# Patient Record
Sex: FEMALE | Race: BLACK OR AFRICAN AMERICAN | NOT HISPANIC OR LATINO | Employment: FULL TIME | URBAN - METROPOLITAN AREA
[De-identification: names, ages, dates, MRNs, and addresses within clinical notes are randomized per-mention and may not be internally consistent; named-entity substitution may affect disease eponyms.]

---

## 2017-01-28 ENCOUNTER — OFFICE VISIT (OUTPATIENT)
Dept: URGENT CARE | Facility: URGENT CARE | Age: 32
End: 2017-01-28
Payer: COMMERCIAL

## 2017-01-28 VITALS
WEIGHT: 293 LBS | OXYGEN SATURATION: 96 % | BODY MASS INDEX: 44.31 KG/M2 | TEMPERATURE: 98.3 F | DIASTOLIC BLOOD PRESSURE: 94 MMHG | SYSTOLIC BLOOD PRESSURE: 154 MMHG | HEART RATE: 98 BPM

## 2017-01-28 DIAGNOSIS — R30.0 DYSURIA: Primary | ICD-10-CM

## 2017-01-28 DIAGNOSIS — M54.6 ACUTE RIGHT-SIDED THORACIC BACK PAIN: ICD-10-CM

## 2017-01-28 DIAGNOSIS — N92.6 MISSED PERIOD: ICD-10-CM

## 2017-01-28 DIAGNOSIS — N89.8 VAGINAL DISCHARGE: ICD-10-CM

## 2017-01-28 DIAGNOSIS — N76.0 BACTERIAL VAGINOSIS: ICD-10-CM

## 2017-01-28 DIAGNOSIS — B96.89 BACTERIAL VAGINOSIS: ICD-10-CM

## 2017-01-28 LAB
ALBUMIN UR-MCNC: NEGATIVE MG/DL
APPEARANCE UR: CLEAR
BETA HCG QUAL IFA URINE: NEGATIVE
BILIRUB UR QL STRIP: NEGATIVE
COLOR UR AUTO: YELLOW
GLUCOSE UR STRIP-MCNC: NEGATIVE MG/DL
HGB UR QL STRIP: NEGATIVE
KETONES UR STRIP-MCNC: NEGATIVE MG/DL
LEUKOCYTE ESTERASE UR QL STRIP: NEGATIVE
MICRO REPORT STATUS: ABNORMAL
NITRATE UR QL: NEGATIVE
PH UR STRIP: 5.5 PH (ref 5–7)
SP GR UR STRIP: 1.02 (ref 1–1.03)
SPECIMEN SOURCE: ABNORMAL
URN SPEC COLLECT METH UR: NORMAL
UROBILINOGEN UR STRIP-ACNC: 0.2 EU/DL (ref 0.2–1)
WET PREP SPEC: ABNORMAL

## 2017-01-28 PROCEDURE — 81003 URINALYSIS AUTO W/O SCOPE: CPT | Performed by: PHYSICIAN ASSISTANT

## 2017-01-28 PROCEDURE — 87210 SMEAR WET MOUNT SALINE/INK: CPT | Performed by: PHYSICIAN ASSISTANT

## 2017-01-28 PROCEDURE — 99213 OFFICE O/P EST LOW 20 MIN: CPT | Performed by: PHYSICIAN ASSISTANT

## 2017-01-28 PROCEDURE — 84703 CHORIONIC GONADOTROPIN ASSAY: CPT | Performed by: PHYSICIAN ASSISTANT

## 2017-01-28 RX ORDER — NAPROXEN 500 MG/1
500 TABLET ORAL 2 TIMES DAILY PRN
Qty: 30 TABLET | Refills: 1 | Status: SHIPPED | OUTPATIENT
Start: 2017-01-28 | End: 2017-11-30

## 2017-01-28 RX ORDER — CYCLOBENZAPRINE HCL 10 MG
10 TABLET ORAL
Qty: 14 TABLET | Refills: 1 | Status: SHIPPED | OUTPATIENT
Start: 2017-01-28 | End: 2017-11-30

## 2017-01-28 RX ORDER — METRONIDAZOLE 500 MG/1
500 TABLET ORAL 2 TIMES DAILY
Qty: 14 TABLET | Refills: 0 | Status: SHIPPED | OUTPATIENT
Start: 2017-01-28 | End: 2017-04-11

## 2017-01-28 ASSESSMENT — PAIN SCALES - GENERAL: PAINLEVEL: SEVERE PAIN (6)

## 2017-01-28 NOTE — PROGRESS NOTES
SUBJECTIVE:                                                    Mckayla Jeff is a 31 year old female who presents to clinic today for the following health issues:      Back Pain      Duration: 1 week        Specific cause: none    Description:   Location of pain: low back right, middle of back right and upper back right  Character of pain: sharp and gnawing  Pain radiation:none  New numbness or weakness in legs, not attributed to pain:  no     Intensity: Currently 8/10    History:   Pain interferes with job: YES, starting to  History of back problems: no prior back problems  Any previous MRI or X-rays: None  Sees a specialist for back pain:  No  Therapies tried without relief: pain patch and bengay    Alleviating factors:   Improved by: pain patch      Precipitating factors:  Worsened by: Nothing    Functional and Psychosocial Screen ([facility] STar[facility] Back):      Not performed today       LMP November  No Known Allergies    Past Medical History   Diagnosis Date     Hypertension      PCOS (polycystic ovarian syndrome)      Obesity      Contusion of knee 12/9/2014         Current Outpatient Prescriptions on File Prior to Visit:  losartan-hydrochlorothiazide (HYZAAR) 100-25 MG per tablet TAKE ONE TABLET BY MOUTH EVERY DAY   metFORMIN (GLUCOPHAGE) 500 MG tablet Take 1 tablet (500 mg) by mouth 3 times daily (with meals)     No current facility-administered medications on file prior to visit.    Social History   Substance Use Topics     Smoking status: Never Smoker      Smokeless tobacco: Never Used     Alcohol Use: No       ROS:  General: negative for fever  ABD: Denies abd pain  : as above    OBJECTIVE:  /94 mmHg  Pulse 98  Temp(Src) 98.3  F (36.8  C) (Oral)  Wt 313 lb (141.976 kg)  SpO2 96%  LMP 11/20/2016 (Exact Date)  Breastfeeding? No   General:   awake, alert, and cooperative.  NAD.   Head: Normocephalic, atraumatic.  Eyes: Conjunctiva clear, non icteric.   ABD: soft, no tenderness to palpation , no  rigidity, guarding or rebound . No CVAT  Neuro: Alert and oriented - normal speech.   Results for orders placed or performed in visit on 01/28/17   *UA reflex to Microscopic and Culture ([facility] (except [place] and [place])   Result Value Ref Range    Color Urine Yellow     Appearance Urine Clear     Glucose Urine Negative NEG mg/dL    Bilirubin Urine Negative NEG    Ketones Urine Negative NEG mg/dL    Specific Gravity Urine 1.025 1.003 - 1.035    Blood Urine Negative NEG    pH Urine 5.5 5.0 - 7.0 pH    Protein Albumin Urine Negative NEG mg/dL    Urobilinogen Urine 0.2 0.2 - 1.0 EU/dL    Nitrite Urine Negative NEG    Leukocyte Esterase Urine Negative NEG    Source Midstream Urine    Beta HCG qual IFA urine   Result Value Ref Range    Beta HCG Qual IFA Urine Negative NEG   Wet prep   Result Value Ref Range    Specimen Description Vagina     Wet Prep (A)      No Trichomonas seen  Clue cells seen  No yeast seen      Micro Report Status FINAL 01/28/2017        ASSESSMENT:        ICD-10-CM    1. Dysuria R30.0 *UA reflex to Microscopic and Culture ([facility] (except [facility])   2. Missed period N92.6 Beta HCG qual IFA urine   3. Vaginal discharge N89.8 Wet prep   4. Bacterial vaginosis N76.0 metroNIDAZOLE (FLAGYL) 500 MG tablet    B96.89    5. Acute right-sided thoracic back pain M54.6 cyclobenzaprine (FLEXERIL) 10 MG tablet     naproxen (NAPROSYN) 500 MG tablet       PLAN: F/U PCP prn. Thoracic pain musculoskeletal in nature. F/U PCP prn.     Urmila Maynard PA-C

## 2017-01-28 NOTE — MR AVS SNAPSHOT
After Visit Summary   2017    Mckayla Jeff    MRN: [id number]           Patient Information     Date Of Birth          1985        Visit Information        Provider Department      2017 4:05 PM Urmila Maynard PA-C [facility]        Today's Diagnoses     Dysuria    -  1     Missed period         Vaginal discharge         Bacterial vaginosis         Acute right-sided thoracic back pain           Care Instructions      Bacterial Vaginosis    You have a vaginal infection called bacterial vaginosis (BV). Both good and bad bacteria are present in a healthy vagina. BV occurs when these bacteria get out of balance. The number of bad bacteria increase. And the number of good bacteria decrease.  BV may or may not cause symptoms. If symptoms do occur, they can include:    Thin, gray, milky-white, or sometimes green discharge    Unpleasant odor or  fishy  smell    Itching, burning, or pain in or around the vagina  It is not known what causes BV, but certain factors can make the problem more likely. This can include:    Douching    Having sex with a new partner    Having sex with more than one partner  BV will sometimes go away on its own. But treatment is usually recommended. This is because untreated BV can increase the risk of more serious health problems such as:    Pelvic inflammatory disease (PID)     delivery (giving birth to a baby early if you re pregnant)    HIV and certain other sexually transmitted diseases (STDs)    Infection after surgery on the reproductive organs  Home care  General care    BV is most often treated with medicines called antibiotics. These may be given as pills or as a vaginal cream. If antibiotics are prescribed, be sure to use them exactly as directed. Also, be sure to complete all of the medicine, even if your symptoms go away.    Avoid douching or having sex during treatment.    If you have sex with a female partner, ask your  healthcare provider if she should also be treated.  Prevention    Limit or avoid douching.    Avoid having sex. If you do have sex, then take steps to lower your risk:    Use condoms when having sex.    Limit the number of partners you have sex with.  Follow-up care  Follow up with your healthcare provider, or as advised.  When to seek medical advice  Call your healthcare provider right away if:    You have a fever of 100.4 F (38 C) or higher, or as directed by your provider.    Your symptoms worsen, or they don t go away within a few days of starting treatment.    You have new pain in the lower belly or pelvic region.    You have side effects that bother you or a reaction to the pills or cream you re prescribed.    You or any partners you have sex with have new symptoms, such as a rash, joint pain, or sores.    8926-8701 [employer]. [address] [place] [address]. All rights reserved. This information is not intended as a substitute for professional medical care. Always follow your healthcare professional's instructions.              Follow-ups after your visit        Who to contact     If you have questions or need follow up information about today's clinic visit or your schedule please contact [facility] directly at [phone].  Normal or non-critical lab and imaging results will be communicated to you by Mc4hart, letter or phone within 4 business days after the clinic has received the results. If you do not hear from us within 7 days, please contact the clinic through Mc4hart or phone. If you have a critical or abnormal lab result, we will notify you by phone as soon as possible.  Submit refill requests through SoundBetter or call your pharmacy and they will forward the refill request to us. Please allow 3 business days for your refill to be completed.          Additional Information About Your Visit        SoundBetter Information     SoundBetter lets you send messages to your  "doctor, view your test results, renew your prescriptions, schedule appointments and more. To sign up, go to [email or web address] . Click on \"Log in\" on the left side of the screen, which will take you to the Welcome page. Then click on \"Sign up Now\" on the right side of the page.     You will be asked to enter the access code listed below, as well as some personal information. Please follow the directions to create your username and password.     Your access code is: [email or web address]  Expires: 2017  5:33 PM     Your access code will  in 90 days. If you need help or a new code, please call your [facility] clinic or [phone].        Care EveryWhere ID     This is your Care EveryWhere ID. This could be used by other organizations to access your [facility] medical records  [id number]        Your Vitals Were     Pulse Temperature Pulse Oximetry Last Period Breastfeeding?       98 98.3  F (36.8  C) (Oral) 96% 2016 (Exact Date) No        Blood Pressure from Last 3 Encounters:   17 154/94   16 132/84   03/10/16 134/82    Weight from Last 3 Encounters:   17 313 lb (141.976 kg)   16 312 lb (141.522 kg)   03/10/16 312 lb 3.2 oz (141.613 kg)              We Performed the Following     *UA reflex to Microscopic and Culture ([facility] (except [place] and [place])     Beta HCG qual IFA urine     Wet prep          Today's Medication Changes          These changes are accurate as of: 17  5:33 PM.  If you have any questions, ask your nurse or doctor.               Start taking these medicines.        Dose/Directions    cyclobenzaprine 10 MG tablet   Commonly known as:  FLEXERIL   Used for:  Acute right-sided thoracic back pain   Started by:  Urmila Maynard PA-C        Dose:  10 mg   Take 1 tablet (10 mg) by mouth nightly as needed for muscle spasms   Quantity:  14 tablet   Refills:  1       metroNIDAZOLE 500 MG tablet   Commonly known as:  FLAGYL "   Used for:  Bacterial vaginosis   Started by:  Urmila Maynard PA-C        Dose:  500 mg   Take 1 tablet (500 mg) by mouth 2 times daily   Quantity:  14 tablet   Refills:  0       naproxen 500 MG tablet   Commonly known as:  NAPROSYN   Used for:  Acute right-sided thoracic back pain   Started by:  Urmila Maynard PA-C        Dose:  500 mg   Take 1 tablet (500 mg) by mouth 2 times daily as needed for moderate pain   Quantity:  30 tablet   Refills:  1            Where to get your medicines      These medications were sent to [facility] [address] [place] [address] [address] [facility]  [address] [facility] [address]     Phone:  [phone]    - cyclobenzaprine 10 MG tablet  - metroNIDAZOLE 500 MG tablet  - naproxen 500 MG tablet             Primary Care Provider Office Phone # Fax #    MELANIA Reyes [phone]       [facility] [address]  [facility] [address]        Thank you!     Thank you for choosing [facility]  for your care. Our goal is always to provide you with excellent care. Hearing back from our patients is one way we can continue to improve our services. Please take a few minutes to complete the written survey that you may receive in the mail after your visit with us. Thank you!             Your Updated Medication List - Protect others around you: Learn how to safely use, store and throw away your medicines at www.disposemymeds.org.          This list is accurate as of: 1/28/17  5:33 PM.  Always use your most recent med list.                   Brand Name Dispense Instructions for use    cyclobenzaprine 10 MG tablet    FLEXERIL    14 tablet    Take 1 tablet (10 mg) by mouth nightly as needed for muscle spasms       losartan-hydrochlorothiazide 100-25 MG per tablet    HYZAAR    90 tablet    TAKE ONE TABLET BY MOUTH EVERY DAY       metFORMIN 500 MG tablet    GLUCOPHAGE    270 tablet    Take 1 tablet (500 mg) by mouth 3  times daily (with meals)       metroNIDAZOLE 500 MG tablet    FLAGYL    14 tablet    Take 1 tablet (500 mg) by mouth 2 times daily       naproxen 500 MG tablet    NAPROSYN    30 tablet    Take 1 tablet (500 mg) by mouth 2 times daily as needed for moderate pain

## 2017-01-28 NOTE — NURSING NOTE
"Chief Complaint   Patient presents with     Back Pain     1 week now       Initial /94 mmHg  Pulse 98  Temp(Src) 98.3  F (36.8  C) (Oral)  Wt 313 lb (141.976 kg)  SpO2 96%  LMP 11/20/2016 (Exact Date)  Breastfeeding? No Estimated body mass index is 44.31 kg/(m^2) as calculated from the following:    Height as of 3/10/16: 5' 10.47\" (1.79 m).    Weight as of this encounter: 313 lb (141.976 kg).  BP completed using cuff size: regular on forearm  Xiomara Campbell CMA      "

## 2017-01-28 NOTE — PATIENT INSTRUCTIONS
Bacterial Vaginosis    You have a vaginal infection called bacterial vaginosis (BV). Both good and bad bacteria are present in a healthy vagina. BV occurs when these bacteria get out of balance. The number of bad bacteria increase. And the number of good bacteria decrease.  BV may or may not cause symptoms. If symptoms do occur, they can include:    Thin, gray, milky-white, or sometimes green discharge    Unpleasant odor or  fishy  smell    Itching, burning, or pain in or around the vagina  It is not known what causes BV, but certain factors can make the problem more likely. This can include:    Douching    Having sex with a new partner    Having sex with more than one partner  BV will sometimes go away on its own. But treatment is usually recommended. This is because untreated BV can increase the risk of more serious health problems such as:    Pelvic inflammatory disease (PID)     delivery (giving birth to a baby early if you re pregnant)    HIV and certain other sexually transmitted diseases (STDs)    Infection after surgery on the reproductive organs  Home care  General care    BV is most often treated with medicines called antibiotics. These may be given as pills or as a vaginal cream. If antibiotics are prescribed, be sure to use them exactly as directed. Also, be sure to complete all of the medicine, even if your symptoms go away.    Avoid douching or having sex during treatment.    If you have sex with a female partner, ask your healthcare provider if she should also be treated.  Prevention    Limit or avoid douching.    Avoid having sex. If you do have sex, then take steps to lower your risk:    Use condoms when having sex.    Limit the number of partners you have sex with.  Follow-up care  Follow up with your healthcare provider, or as advised.  When to seek medical advice  Call your healthcare provider right away if:    You have a fever of 100.4 F (38 C) or higher, or as directed by your  provider.    Your symptoms worsen, or they don t go away within a few days of starting treatment.    You have new pain in the lower belly or pelvic region.    You have side effects that bother you or a reaction to the pills or cream you re prescribed.    You or any partners you have sex with have new symptoms, such as a rash, joint pain, or sores.    4384-8003 [employer]. [address] [place] [address], [place] [address]. All rights reserved. This information is not intended as a substitute for professional medical care. Always follow your healthcare professional's instructions.

## 2017-04-11 ENCOUNTER — OFFICE VISIT (OUTPATIENT)
Dept: FAMILY MEDICINE | Facility: CLINIC | Age: 32
End: 2017-04-11
Payer: COMMERCIAL

## 2017-04-11 VITALS
BODY MASS INDEX: 41.02 KG/M2 | SYSTOLIC BLOOD PRESSURE: 124 MMHG | OXYGEN SATURATION: 98 % | TEMPERATURE: 98.2 F | DIASTOLIC BLOOD PRESSURE: 86 MMHG | HEART RATE: 98 BPM | HEIGHT: 71 IN | WEIGHT: 293 LBS

## 2017-04-11 DIAGNOSIS — Z11.1 SCREENING EXAMINATION FOR PULMONARY TUBERCULOSIS: ICD-10-CM

## 2017-04-11 DIAGNOSIS — Z23 ENCOUNTER FOR IMMUNIZATION: ICD-10-CM

## 2017-04-11 DIAGNOSIS — Z12.4 SCREENING FOR MALIGNANT NEOPLASM OF CERVIX: ICD-10-CM

## 2017-04-11 DIAGNOSIS — E66.01 OBESITY, CLASS III, BMI 40-49.9 (MORBID OBESITY) (H): ICD-10-CM

## 2017-04-11 DIAGNOSIS — Z11.3 SCREENING EXAMINATION FOR VENEREAL DISEASE: ICD-10-CM

## 2017-04-11 DIAGNOSIS — I10 ESSENTIAL HYPERTENSION WITH GOAL BLOOD PRESSURE LESS THAN 140/90: ICD-10-CM

## 2017-04-11 DIAGNOSIS — E87.6 HYPOPOTASSEMIA: ICD-10-CM

## 2017-04-11 DIAGNOSIS — Z00.00 ROUTINE HISTORY AND PHYSICAL EXAMINATION OF ADULT: Primary | ICD-10-CM

## 2017-04-11 DIAGNOSIS — E28.2 PCOS (POLYCYSTIC OVARIAN SYNDROME): ICD-10-CM

## 2017-04-11 LAB
ANION GAP SERPL CALCULATED.3IONS-SCNC: 6 MMOL/L (ref 3–14)
BUN SERPL-MCNC: 13 MG/DL (ref 7–30)
CALCIUM SERPL-MCNC: 9.1 MG/DL (ref 8.5–10.1)
CHLORIDE SERPL-SCNC: 104 MMOL/L (ref 94–109)
CHOLEST SERPL-MCNC: 159 MG/DL
CO2 SERPL-SCNC: 29 MMOL/L (ref 20–32)
CREAT SERPL-MCNC: 0.86 MG/DL (ref 0.52–1.04)
GFR SERPL CREATININE-BSD FRML MDRD: 77 ML/MIN/1.7M2
GLUCOSE SERPL-MCNC: 88 MG/DL (ref 70–99)
HBA1C MFR BLD: 6 % (ref 4.3–6)
HDLC SERPL-MCNC: 44 MG/DL
LDLC SERPL CALC-MCNC: 77 MG/DL
MICRO REPORT STATUS: NORMAL
NONHDLC SERPL-MCNC: 115 MG/DL
POTASSIUM SERPL-SCNC: 3.3 MMOL/L (ref 3.4–5.3)
SODIUM SERPL-SCNC: 139 MMOL/L (ref 133–144)
SPECIMEN SOURCE: NORMAL
TRIGL SERPL-MCNC: 188 MG/DL
WET PREP SPEC: NORMAL

## 2017-04-11 PROCEDURE — 87210 SMEAR WET MOUNT SALINE/INK: CPT | Performed by: PHYSICIAN ASSISTANT

## 2017-04-11 PROCEDURE — G0145 SCR C/V CYTO,THINLAYER,RESCR: HCPCS | Performed by: PHYSICIAN ASSISTANT

## 2017-04-11 PROCEDURE — 87624 HPV HI-RISK TYP POOLED RSLT: CPT | Performed by: PHYSICIAN ASSISTANT

## 2017-04-11 PROCEDURE — 87389 HIV-1 AG W/HIV-1&-2 AB AG IA: CPT | Performed by: PHYSICIAN ASSISTANT

## 2017-04-11 PROCEDURE — 99395 PREV VISIT EST AGE 18-39: CPT | Performed by: PHYSICIAN ASSISTANT

## 2017-04-11 PROCEDURE — 86787 VARICELLA-ZOSTER ANTIBODY: CPT | Performed by: PHYSICIAN ASSISTANT

## 2017-04-11 PROCEDURE — 86765 RUBEOLA ANTIBODY: CPT | Performed by: PHYSICIAN ASSISTANT

## 2017-04-11 PROCEDURE — 87591 N.GONORRHOEAE DNA AMP PROB: CPT | Performed by: PHYSICIAN ASSISTANT

## 2017-04-11 PROCEDURE — 80048 BASIC METABOLIC PNL TOTAL CA: CPT | Performed by: PHYSICIAN ASSISTANT

## 2017-04-11 PROCEDURE — 86762 RUBELLA ANTIBODY: CPT | Performed by: PHYSICIAN ASSISTANT

## 2017-04-11 PROCEDURE — 80061 LIPID PANEL: CPT | Performed by: PHYSICIAN ASSISTANT

## 2017-04-11 PROCEDURE — 86780 TREPONEMA PALLIDUM: CPT | Performed by: PHYSICIAN ASSISTANT

## 2017-04-11 PROCEDURE — 87491 CHLMYD TRACH DNA AMP PROBE: CPT | Performed by: PHYSICIAN ASSISTANT

## 2017-04-11 PROCEDURE — 36415 COLL VENOUS BLD VENIPUNCTURE: CPT | Performed by: PHYSICIAN ASSISTANT

## 2017-04-11 PROCEDURE — 83036 HEMOGLOBIN GLYCOSYLATED A1C: CPT | Performed by: PHYSICIAN ASSISTANT

## 2017-04-11 PROCEDURE — 86480 TB TEST CELL IMMUN MEASURE: CPT | Performed by: PHYSICIAN ASSISTANT

## 2017-04-11 RX ORDER — LOSARTAN POTASSIUM AND HYDROCHLOROTHIAZIDE 25; 100 MG/1; MG/1
1 TABLET ORAL DAILY
Qty: 90 TABLET | Refills: 3 | Status: SHIPPED | OUTPATIENT
Start: 2017-04-11 | End: 2018-05-23

## 2017-04-11 NOTE — MR AVS SNAPSHOT
After Visit Summary   4/11/2017    Mckayla Jeff    MRN: [id number]           Patient Information     Date Of Birth          1985        Visit Information        Provider Department      4/11/2017 3:40 PM Renetta Umana PA-C [facility]        Today's Diagnoses     Routine history and physical examination of adult    -  1    Screening for malignant neoplasm of cervix        Essential hypertension with goal blood pressure less than 140/90        PCOS (polycystic ovarian syndrome)        Encounter for immunization        Screening examination for pulmonary tuberculosis        Screening examination for venereal disease        Obesity, Class III, BMI 40-49.9 (morbid obesity) (H)          Care Instructions      Preventive Health Recommendations  Female Ages 26 - 39  Yearly exam:   See your health care provider every year in order to    Review health changes.     Discuss preventive care.      Review your medicines if you your doctor has prescribed any.    Until age 30: Get a Pap test every three years (more often if you have had an abnormal result).    After age 30: Talk to your doctor about whether you should have a Pap test every 3 years or have a Pap test with HPV screening every 5 years.   You do not need a Pap test if your uterus was removed (hysterectomy) and you have not had cancer.  You should be tested each year for STDs (sexually transmitted diseases), if you're at risk.   Talk to your provider about how often to have your cholesterol checked.  If you are at risk for diabetes, you should have a diabetes test (fasting glucose).  Shots: Get a flu shot each year. Get a tetanus shot every 10 years.   Nutrition:     Eat at least 5 servings of fruits and vegetables each day.    Eat whole-grain bread, whole-wheat pasta and brown rice instead of white grains and rice.    Talk to your provider about Calcium and Vitamin D.     Lifestyle    Exercise at least 150  minutes a week (30 minutes a day, 5 days of the week). This will help you control your weight and prevent disease.    Limit alcohol to one drink per day.    No smoking.     Wear sunscreen to prevent skin cancer.    See your dentist every six months for an exam and cleaning.          Follow-ups after your visit        Additional Services     ENDOCRINOLOGY ADULT REFERRAL       Your provider has referred you to: [facility]: [facility] [phone]   [email or web address]      Please be aware that coverage of these services is subject to the terms and limitations of your health insurance plan.  Call member services at your health plan with any benefit or coverage questions.      Please bring the following to your appointment:    >>   Any x-rays, CTs or MRIs which have been performed.  Contact the facility where they were done to arrange for  prior to your scheduled appointment.    >>   List of current medications   >>   This referral request   >>   Any documents/labs given to you for this referral            NUTRITION REFERRAL       Your provider has referred you to: [facility]: [facility] [phone]   [email or web address]/    Please be aware that coverage of these services is subject to the terms and limitations of your health insurance plan.  Call member services at your health plan with any benefit or coverage questions.      Please bring the following with you to your appointment:    (1) This referral request  (2) Any documents given to you regarding this referral  (3) Any specific questions you have about diet and/or food choices                  Who to contact     If you have questions or need follow up information about today's clinic visit or your schedule please contact [facility] directly at [phone].  Normal or non-critical lab and imaging results  "will be communicated to you by MyChart, letter or phone within 4 business days after the clinic has received the results. If you do not hear from us within 7 days, please contact the clinic through File[employer] Document Management software or phone. If you have a critical or abnormal lab result, we will notify you by phone as soon as possible.  Submit refill requests through [employer] Management software or call your pharmacy and they will forward the refill request to us. Please allow 3 business days for your refill to be completed.          Additional Information About Your Visit        FileHold Document Management software Information     File[employer] Document Management software gives you secure access to your electronic health record. If you see a primary care provider, you can also send messages to your care team and make appointments. If you have questions, please call your primary care clinic.  If you do not have a primary care provider, please call [phone] and they will assist you.        Care EveryWhere ID     This is your Care EveryWhere ID. This could be used by other organizations to access your [facility] medical records  [id number]        Your Vitals Were     Pulse Temperature Height Last Period Pulse Oximetry Breastfeeding?    98 98.2  F (36.8  C) (Oral) 5' 11\" (1.803 m) 11/22/2016 98% No    BMI (Body Mass Index)                   42.96 kg/m2            Blood Pressure from Last 3 Encounters:   04/11/17 124/86   01/28/17 (!) 154/94   04/19/16 132/84    Weight from Last 3 Encounters:   04/11/17 (!) 308 lb (139.7 kg)   01/28/17 (!) 313 lb (142 kg)   04/19/16 (!) 312 lb (141.5 kg)              We Performed the Following     Anti Treponema     Basic metabolic panel  (Ca, Cl, CO2, Creat, Gluc, K, Na, BUN)     CHLAMYDIA TRACHOMATIS PCR     ENDOCRINOLOGY ADULT REFERRAL     Hemoglobin A1c     HIV Antigen Antibody Combo     M Tuberculosis by Quantiferon     Mumps Antibodies  IgG ([facility])     NEISSERIA GONORRHOEA PCR     NUTRITION REFERRAL     Pap imaged thin layer screen with HPV - recommended age 30 - 65 years (select " HPV order below)     Rubella Antibody IgG Quantitative     Rubeola Antibody IgG     Varicella Zoster Virus Antibody IgG     Wet prep          Today's Medication Changes          These changes are accurate as of: 4/11/17  4:20 PM.  If you have any questions, ask your nurse or doctor.               These medicines have changed or have updated prescriptions.        Dose/Directions    losartan-hydrochlorothiazide 100-25 MG per tablet   Commonly known as:  HYZAAR   This may have changed:  See the new instructions.   Used for:  Essential hypertension with goal blood pressure less than 140/90   Changed by:  Renetta Umana PA-C        Dose:  1 tablet   Take 1 tablet by mouth daily   Quantity:  90 tablet   Refills:  3            Where to get your medicines      These medications were sent to [facility] [address] [place] [address]     Phone:  [phone]     losartan-hydrochlorothiazide 100-25 MG per tablet    metFORMIN 500 MG tablet                Primary Care Provider Office Phone # Fax #    MELANIA Reyes [phone]       [facility] [address]  [facility] [address]        Thank you!     Thank you for choosing [facility]  for your care. Our goal is always to provide you with excellent care. Hearing back from our patients is one way we can continue to improve our services. Please take a few minutes to complete the written survey that you may receive in the mail after your visit with us. Thank you!             Your Updated Medication List - Protect others around you: Learn how to safely use, store and throw away your medicines at www.disposemymeds.org.          This list is accurate as of: 4/11/17  4:20 PM.  Always use your most recent med list.                   Brand Name Dispense Instructions for use    cyclobenzaprine 10 MG tablet    FLEXERIL    14 tablet    Take 1 tablet  (10 mg) by mouth nightly as needed for muscle spasms       losartan-hydrochlorothiazide 100-25 MG per tablet    HYZAAR    90 tablet    Take 1 tablet by mouth daily       metFORMIN 500 MG tablet    GLUCOPHAGE    270 tablet    Take 1 tablet (500 mg) by mouth 3 times daily (with meals)       naproxen 500 MG tablet    NAPROSYN    30 tablet    Take 1 tablet (500 mg) by mouth 2 times daily as needed for moderate pain

## 2017-04-11 NOTE — PROGRESS NOTES
SUBJECTIVE:     CC: Mckayla Jeff is an 31 year old woman who presents for preventive health visit.     Healthy Habits:    Do you get at least three servings of calcium containing foods daily (dairy, green leafy vegetables, etc.)? no    Amount of exercise or daily activities, outside of work: 4 day(s) per week    Problems taking medications regularly No    Medication side effects: Yes metformin seems to have patient lose appetite     Have you had an eye exam in the past two years? no    Do you see a dentist twice per year? yes    Do you have sleep apnea, excessive snoring or daytime drowsiness?no    Medication  Refill    Taking medication as prescribed: yes   Side effects: loss of appetite   Helping symptoms : yes        Today's PHQ-2 Score:   PHQ-2 ( 1999 Pf[employer]) 6/11/2015 4/18/2014   Q1: Little interest or pleasure in doing things 0 0   Q2: Feeling down, depressed or hopeless 0 1   PHQ-2 Score 0 1       Abuse: Current or Past(Physical, Sexual or Emotional)- No  Do you feel safe in your environment - Yes    Social History   Substance Use Topics     Smoking status: Never Smoker     Smokeless tobacco: Never Used     Alcohol use No     The patient does not drink >3 drinks per day nor >7 drinks per week.    Recent Labs   Lab Test  06/11/15   0927  04/19/14   1018   CHOL  170  161   HDL  35*  32*   LDL  96  100   TRIG  195*  143   CHOLHDLRATIO  4.9  5.0       Reviewed orders with patient.  Reviewed health maintenance and updated orders accordingly - Yes    Mammo Decision Support:  Mammogram not appropriate for this patient based on age.    Pertinent mammograms are reviewed under the imaging tab.  History of abnormal Pap smear: NO - age 30-65 PAP every 5 years with negative HPV co-testing recommended    Reviewed and updated as needed this visit by clinical staff  Tobacco  Allergies  Meds  Med Hx  Surg Hx  Fam Hx  Soc Hx        Reviewed and updated as needed this visit by Provider        Past Medical  "History:   Diagnosis Date     Contusion of knee 12/9/2014     Hypertension      Obesity      PCOS (polycystic ovarian syndrome)       Past Surgical History:   Procedure Laterality Date     NO HISTORY OF SURGERY         ROS:  C: NEGATIVE for fever, chills, change in weight  I: NEGATIVE for worrisome rashes, moles or lesions  E: NEGATIVE for vision changes or irritation  ENT: NEGATIVE for ear, mouth and throat problems  R: NEGATIVE for significant cough or SOB  B: NEGATIVE for masses, tenderness or discharge  CV: NEGATIVE for chest pain, palpitations or peripheral edema  GI: NEGATIVE for nausea, abdominal pain, heartburn, or change in bowel habits  : NEGATIVE for unusual urinary or vaginal symptoms. Periods are regular.  M: NEGATIVE for significant arthralgias or myalgia  N: NEGATIVE for weakness, dizziness or paresthesias  P: NEGATIVE for changes in mood or affect    Problem list, Medication list, Allergies, and Medical/Social/Surgical histories reviewed in [facility] and updated as appropriate.  Current Outpatient Prescriptions   Medication Sig Dispense Refill     losartan-hydrochlorothiazide (HYZAAR) 100-25 MG per tablet Take 1 tablet by mouth daily 90 tablet 3     metFORMIN (GLUCOPHAGE) 500 MG tablet Take 1 tablet (500 mg) by mouth 3 times daily (with meals) 270 tablet 3     cyclobenzaprine (FLEXERIL) 10 MG tablet Take 1 tablet (10 mg) by mouth nightly as needed for muscle spasms 14 tablet 1     naproxen (NAPROSYN) 500 MG tablet Take 1 tablet (500 mg) by mouth 2 times daily as needed for moderate pain 30 tablet 1     [DISCONTINUED] losartan-hydrochlorothiazide (HYZAAR) 100-25 MG per tablet TAKE ONE TABLET BY MOUTH EVERY DAY 90 tablet 1     [DISCONTINUED] metFORMIN (GLUCOPHAGE) 500 MG tablet Take 1 tablet (500 mg) by mouth 3 times daily (with meals) 270 tablet 1     No Known Allergies  OBJECTIVE:     /86  Pulse 98  Temp 98.2  F (36.8  C) (Oral)  Ht 5' 11\" (1.803 m)  Wt (!) 308 lb (139.7 kg)  LMP 11/22/2016  " SpO2 98%  Breastfeeding? No  BMI 42.96 kg/m2  EXAM:  GENERAL: healthy, alert and no distress  EYES: Eyes grossly normal to inspection, PERRL and conjunctivae and sclerae normal  HENT: ear canals and TM's normal, nose and mouth without ulcers or lesions  NECK: no adenopathy, no asymmetry, masses, or scars and thyroid normal to palpation  RESP: lungs clear to auscultation - no rales, rhonchi or wheezes  BREAST: normal without masses, tenderness or nipple discharge and no palpable axillary masses or adenopathy  CV: regular rate and rhythm, normal S1 S2, no S3 or S4, no murmur, click or rub, no peripheral edema and peripheral pulses strong  ABDOMEN: soft, nontender, no hepatosplenomegaly, no masses and bowel sounds normal   (female): normal female external genitalia, normal urethral meatus, vaginal mucosa pink, moist, well rugated, and normal cervix/adnexa/uterus without masses or discharge  MS: no gross musculoskeletal defects noted, no edema  SKIN: no suspicious lesions or rashes  NEURO: Normal strength and tone, mentation intact and speech normal  PSYCH: mentation appears normal, affect normal/bright    ASSESSMENT/PLAN:         ICD-10-CM    1. Routine history and physical examination of adult Z00.00    2. Screening for malignant neoplasm of cervix Z12.4 Pap imaged thin layer screen with HPV - recommended age 30 - 65 years (select HPV order below)   3. Essential hypertension with goal blood pressure less than 140/90 I10 losartan-hydrochlorothiazide (HYZAAR) 100-25 MG per tablet     Basic metabolic panel  (Ca, Cl, CO2, Creat, Gluc, K, Na, BUN)   4. PCOS (polycystic ovarian syndrome) E28.2 metFORMIN (GLUCOPHAGE) 500 MG tablet     Hemoglobin A1c     ENDOCRINOLOGY ADULT REFERRAL     NUTRITION REFERRAL     Lipid Profile (Chol, Trig, HDL, LDL calc)   5. Encounter for immunization Z23 Mumps Antibodies  IgG ([facility])     Rubeola Antibody IgG     Rubella Antibody IgG Quantitative     Varicella Zoster Virus Antibody IgG  "  6. Screening examination for pulmonary tuberculosis Z11.1 M Tuberculosis by [facility]   7. Screening examination for venereal disease Z11.3 NEISSERIA GONORRHOEA PCR     CHLAMYDIA TRACHOMATIS PCR     Wet prep     HIV Antigen Antibody Combo     Anti Treponema   8. Obesity, Class III, BMI 40-49.9 (morbid obesity) (H) E66.01 NUTRITION REFERRAL     Lipid Profile (Chol, Trig, HDL, LDL calc)       COUNSELING:   Reviewed preventive health counseling, as reflected in patient instructions       Regular exercise       Healthy diet/nutrition       Contraception       Safe sex practices/STD prevention         reports that she has never smoked. She has never used smokeless tobacco.    Estimated body mass index is 42.96 kg/(m^2) as calculated from the following:    Height as of this encounter: 5' 11\" (1.803 m).    Weight as of this encounter: 308 lb (139.7 kg).   Weight management plan: Patient referred to endocrine and/or weight management specialty    Counseling Resources:  ATP IV Guidelines  Pooled Cohorts Equation Calculator  Breast Cancer Risk Calculator  FRAX Risk Assessment  ICSI Preventive Guidelines  Dietary Guidelines for Americans, 2010  USDA's MyPlate  ASA Prophylaxis  Lung CA Screening    Renetta Umana PA-C  [facility]  "

## 2017-04-11 NOTE — NURSING NOTE
"Chief Complaint   Patient presents with     Physical     Medication Request       Initial /86  Pulse 98  Temp 98.2  F (36.8  C) (Oral)  Ht 5' 11\" (1.803 m)  Wt (!) 308 lb (139.7 kg)  LMP 11/22/2016  SpO2 98%  Breastfeeding? No  BMI 42.96 kg/m2 Estimated body mass index is 42.96 kg/(m^2) as calculated from the following:    Height as of this encounter: 5' 11\" (1.803 m).    Weight as of this encounter: 308 lb (139.7 kg).  Medication Reconciliation: complete       Aylin Paulino CMA      "

## 2017-04-11 NOTE — LETTER
April 18, 2017    Mckayla Jeff  [address] [place] [address]  [facility] [address]    Dear Mckayla,  We are happy to inform you that your PAP smear result from 4/11/17 is normal.  We are now able to do a follow up test on PAP smears. The DNA test is for HPV (Human Papilloma Virus). Cervical cancer is closely linked with certain types of HPV. Your result showed no evidence of high risk HPV.  Therefore we recommend you return in 5 years for your next pap smear and HPV test.  You will still need to return to the clinic every year for an annual exam and other preventive tests.  Please contact the clinic at [phone] with any questions.  Sincerely,    Renetta Umana PA-C/bertin

## 2017-04-12 LAB
C TRACH DNA SPEC QL NAA+PROBE: NORMAL
HIV 1+2 AB+HIV1 P24 AG SERPL QL IA: NORMAL
N GONORRHOEA DNA SPEC QL NAA+PROBE: NORMAL
SPECIMEN SOURCE: NORMAL
SPECIMEN SOURCE: NORMAL
T PALLIDUM IGG+IGM SER QL: NEGATIVE

## 2017-04-13 LAB
COPATH REPORT: NORMAL
M TB TUBERC IFN-G BLD QL: ABNORMAL
M TB TUBERC IFN-G/MITOGEN IGNF BLD: 0 IU/ML
MEV IGG SER QL IA: 3.1 AI (ref 0–0.8)
PAP: NORMAL
RUBV IGG SERPL IA-ACNC: 38 IU/ML
VZV IGG SER QL IA: 3.1 AI (ref 0–0.8)

## 2017-04-14 RX ORDER — POTASSIUM CHLORIDE 750 MG/1
10 TABLET, EXTENDED RELEASE ORAL DAILY
Qty: 90 TABLET | Refills: 1 | Status: SHIPPED | OUTPATIENT
Start: 2017-04-14 | End: 2018-05-23

## 2017-04-17 LAB
FINAL DIAGNOSIS: NORMAL
HPV HR 12 DNA CVX QL NAA+PROBE: NEGATIVE
HPV16 DNA SPEC QL NAA+PROBE: NEGATIVE
HPV18 DNA SPEC QL NAA+PROBE: NEGATIVE
SPECIMEN DESCRIPTION: NORMAL

## 2017-06-21 ENCOUNTER — OFFICE VISIT (OUTPATIENT)
Dept: FAMILY MEDICINE | Facility: CLINIC | Age: 32
End: 2017-06-21
Payer: COMMERCIAL

## 2017-06-21 ENCOUNTER — TELEPHONE (OUTPATIENT)
Dept: FAMILY MEDICINE | Facility: CLINIC | Age: 32
End: 2017-06-21

## 2017-06-21 VITALS
TEMPERATURE: 97.4 F | DIASTOLIC BLOOD PRESSURE: 86 MMHG | OXYGEN SATURATION: 98 % | WEIGHT: 293 LBS | HEIGHT: 71 IN | HEART RATE: 101 BPM | BODY MASS INDEX: 41.02 KG/M2 | SYSTOLIC BLOOD PRESSURE: 139 MMHG

## 2017-06-21 DIAGNOSIS — Z23 ENCOUNTER FOR IMMUNIZATION: ICD-10-CM

## 2017-06-21 DIAGNOSIS — I10 HYPERTENSION GOAL BP (BLOOD PRESSURE) < 140/90: ICD-10-CM

## 2017-06-21 DIAGNOSIS — Z11.3 SCREEN FOR STD (SEXUALLY TRANSMITTED DISEASE): Primary | ICD-10-CM

## 2017-06-21 DIAGNOSIS — E66.01 OBESITY, CLASS III, BMI 40-49.9 (MORBID OBESITY) (H): ICD-10-CM

## 2017-06-21 DIAGNOSIS — N76.0 BACTERIAL VAGINOSIS: Primary | ICD-10-CM

## 2017-06-21 DIAGNOSIS — B96.89 BACTERIAL VAGINOSIS: Primary | ICD-10-CM

## 2017-06-21 LAB
MICRO REPORT STATUS: ABNORMAL
SPECIMEN SOURCE: ABNORMAL
WET PREP SPEC: ABNORMAL

## 2017-06-21 PROCEDURE — 36415 COLL VENOUS BLD VENIPUNCTURE: CPT | Performed by: PREVENTIVE MEDICINE

## 2017-06-21 PROCEDURE — 87389 HIV-1 AG W/HIV-1&-2 AB AG IA: CPT | Performed by: PREVENTIVE MEDICINE

## 2017-06-21 PROCEDURE — 86706 HEP B SURFACE ANTIBODY: CPT | Performed by: PREVENTIVE MEDICINE

## 2017-06-21 PROCEDURE — 86803 HEPATITIS C AB TEST: CPT | Performed by: PREVENTIVE MEDICINE

## 2017-06-21 PROCEDURE — 87210 SMEAR WET MOUNT SALINE/INK: CPT | Performed by: PREVENTIVE MEDICINE

## 2017-06-21 PROCEDURE — 86735 MUMPS ANTIBODY: CPT | Performed by: PREVENTIVE MEDICINE

## 2017-06-21 PROCEDURE — 99214 OFFICE O/P EST MOD 30 MIN: CPT | Performed by: PREVENTIVE MEDICINE

## 2017-06-21 PROCEDURE — 86780 TREPONEMA PALLIDUM: CPT | Performed by: PREVENTIVE MEDICINE

## 2017-06-21 PROCEDURE — 87340 HEPATITIS B SURFACE AG IA: CPT | Performed by: PREVENTIVE MEDICINE

## 2017-06-21 RX ORDER — METRONIDAZOLE 500 MG/1
500 TABLET ORAL 2 TIMES DAILY
Qty: 14 TABLET | Refills: 0 | Status: SHIPPED | OUTPATIENT
Start: 2017-06-21 | End: 2017-09-19

## 2017-06-21 ASSESSMENT — PAIN SCALES - GENERAL: PAINLEVEL: NO PAIN (0)

## 2017-06-21 NOTE — PROGRESS NOTES
"Mckayla,     The lab test shows evidence of bacterial vaginosis(BV).It is one of the most common cause of vaginitis.It represents a complex change in the vaginal sharath.Approximately 50 to 75 percent of women with BV are asymptomatic. Those with symptoms present with an unpleasant, \"fishy smelling\" discharge that is more noticeable after coitus.I do recommend treatment with metronidazole orally twice a day for a week, that's been sent to the pharmacy for you to .The medication  may give metallic taste in mouth, but over all well tolerated by most. Avoid alcohol while using this medication.  Please call if any questions or concerns.     Please do not hesitate to call us at [phone] if you have any questions or concerns.    Thank you,    Shiloh Ochoa MD MPH"

## 2017-06-21 NOTE — NURSING NOTE
"Chief Complaint   Patient presents with     STD     check       Initial /86  Pulse 101  Temp 97.4  F (36.3  C) (Oral)  Ht 5' 11\" (1.803 m)  Wt (!) 306 lb (138.8 kg)  LMP  (LMP Unknown)  SpO2 98%  Breastfeeding? No  BMI 42.68 kg/m2 Estimated body mass index is 42.68 kg/(m^2) as calculated from the following:    Height as of this encounter: 5' 11\" (1.803 m).    Weight as of this encounter: 306 lb (138.8 kg).  Medication Reconciliation: complete   Bala YOUNG        "

## 2017-06-21 NOTE — TELEPHONE ENCOUNTER
Called patient to let her know she needs to come and redo urine as was not collected correctly but mailbox full so will try later.  Bala YOUNG

## 2017-06-21 NOTE — MR AVS SNAPSHOT
After Visit Summary   6/21/2017    Mckayla Jeff    MRN: [id number]           Patient Information     Date Of Birth          1985        Visit Information        Provider Department      6/21/2017 4:20 PM Shiloh Ochoa MD [facility]        Today's Diagnoses     Screen for STD (sexually transmitted disease)    -  1    Obesity, Class III, BMI 40-49.9 (morbid obesity) (H)        Hypertension goal BP (blood pressure) < 140/90        Encounter for immunization          Care Instructions    At [facility], we strive to deliver an exceptional experience to you, every time we see you.    If you receive a survey in the mail, please send us back your thoughts. We really do value your feedback.    Thank you for visiting [facility]    Normal or non-critical lab and imaging results will be communicated to you by MyChart, letter or phone within 7 days.  If you do not hear from us within 10 days, please call the clinic. If you have a critical or abnormal lab result, we will notify you by phone as soon as possible.     If you have any questions regarding your visit please contact:     Team Luz/Spirit  Clinic Hours Telephone Number   Dr. Murtaza Maciel   7am-7pm  Monday through Thursday  7am-5pm Friday [phone]  Dona CARRILLO RN   Pharmacy 8:30am-9pm Monday-Friday    9am-5pm Saturday-Sunday [phone]   Urgent Care 11am-9pm Monday-Friday        9am-5pm Saturday-Sunday [phone]     After hours, weekend or if you need to make an appointment with your primary provider please call [phone].   After Hours nurse advise: call [facility]: [phone]    Use ShareDeskhart (secure email communication and access to your chart) to send your primary care provider a message or make an appointment. Ask someone on your Team how to sign  up for [email or web address]. To log on to KnowRe or for more information in [facility] please visit the website at [email or web address].   As of October 8, 2013, all password changes, disabled accounts, or ID changes in [email or web address]/[email or web address] will be done by our Access Services Department.   If you need help with your account or password, call: [phone]. Clinic staff no longer has the ability to change passwords.             Follow-ups after your visit        Follow-up notes from your care team     Return in about 6 months (around 12/21/2017) for BP Recheck.      Who to contact     If you have questions or need follow up information about today's clinic visit or your schedule please contact [facility] directly at [phone].  Normal or non-critical lab and imaging results will be communicated to you by DriverSidehart, letter or phone within 4 business days after the clinic has received the results. If you do not hear from us within 7 days, please contact the clinic through Vidientt or phone. If you have a critical or abnormal lab result, we will notify you by phone as soon as possible.  Submit refill requests through BeHome247 or call your pharmacy and they will forward the refill request to us. Please allow 3 business days for your refill to be completed.          Additional Information About Your Visit        [email or web address] Information     BeHome247 gives you secure access to your electronic health record. If you see a primary care provider, you can also send messages to your care team and make appointments. If you have questions, please call your primary care clinic.  If you do not have a primary care provider, please call [phone] and they will assist you.        Care EveryWhere ID     This is your Care EveryWhere ID. This could be used by other organizations to access your [facility] medical records  [id number]        Your Vitals Were     Pulse Temperature Height Last Period Pulse Oximetry Breastfeeding?    101  "97.4  F (36.3  C) (Oral) 5' 11\" (1.803 m) (LMP Unknown) 98% No    BMI (Body Mass Index)                   42.68 kg/m2            Blood Pressure from Last 3 Encounters:   06/21/17 139/86   04/11/17 124/86   01/28/17 (!) 154/94    Weight from Last 3 Encounters:   06/21/17 (!) 306 lb (138.8 kg)   04/11/17 (!) 308 lb (139.7 kg)   01/28/17 (!) 313 lb (142 kg)              We Performed the Following     Anti Treponema     Chlamydia trachomatis PCR     Hepatitis B Surface Antibody     Hepatitis B surface antigen     Hepatitis C antibody     HIV Antigen Antibody Combo     Mumps Antibody IgG     Neisseria gonorrhoeae PCR     Wet prep        Primary Care Provider Office Phone # Fax #    Urmila MELANIA Norton [phone]       [facility] [address]  [facility] [address]        Equal Access to Services     NOEMI BACON : Hadii aad ku hadasho Soomaali, waaxda luqadaha, qaybta kaalmada adeegyada, waxay idiin hayaan adeeg kharash la'nyasia . So [facility] [phone].    ATENCIÓN: Si anivalla español, tiene a ramos disposición servicios gratuitos de asistencia lingüística. Llame al [phone].    We comply with applicable federal civil rights laws and [place] laws. We do not discriminate on the basis of race, color, national origin, age, disability sex, sexual orientation or gender identity.            Thank you!     Thank you for choosing [facility]  for your care. Our goal is always to provide you with excellent care. Hearing back from our patients is one way we can continue to improve our services. Please take a few minutes to complete the written survey that you may receive in the mail after your visit with us. Thank you!             Your Updated Medication List - Protect others around you: Learn how to safely use, store and throw away your medicines at www.disposemymeds.org.          This list is accurate as of: 6/21/17  4:45 PM.  Always use your most recent med list.                "    Brand Name Dispense Instructions for use Diagnosis    cyclobenzaprine 10 MG tablet    FLEXERIL    14 tablet    Take 1 tablet (10 mg) by mouth nightly as needed for muscle spasms    Acute right-sided thoracic back pain       losartan-hydrochlorothiazide 100-25 MG per tablet    HYZAAR    90 tablet    Take 1 tablet by mouth daily    Essential hypertension with goal blood pressure less than 140/90       metFORMIN 500 MG tablet    GLUCOPHAGE    270 tablet    Take 1 tablet (500 mg) by mouth 3 times daily (with meals)    PCOS (polycystic ovarian syndrome)       naproxen 500 MG tablet    NAPROSYN    30 tablet    Take 1 tablet (500 mg) by mouth 2 times daily as needed for moderate pain    Acute right-sided thoracic back pain       potassium chloride SA 10 MEQ CR tablet    K-DUR/KLOR-CON M    90 tablet    Take 1 tablet (10 mEq) by mouth daily    Hypopotassemia

## 2017-06-22 LAB
MUV IGG SER QL IA: 0.8 AI (ref 0–0.8)
T PALLIDUM IGG+IGM SER QL: NEGATIVE

## 2017-06-22 NOTE — PROGRESS NOTES
Mckayla,     Test for Syphilis is negative. Other labs are pending.     Please do not hesitate to call us at [phone] if you have any questions or concerns.    Thank you,    Shiloh Ochoa MD MPH

## 2017-06-23 LAB
HBV SURFACE AB SERPL IA-ACNC: 67.62 M[IU]/ML
HBV SURFACE AG SERPL QL IA: NONREACTIVE
HCV AB SERPL QL IA: NORMAL
HIV 1+2 AB+HIV1 P24 AG SERPL QL IA: NORMAL

## 2017-06-26 NOTE — PROGRESS NOTES
Mckayla,     Tests for HIV, Hepatitis B and C are negative.     Please do not hesitate to call us at [phone] if you have any questions or concerns.    Thank you,    Shiloh Ochoa MD MPH

## 2017-06-28 DIAGNOSIS — Z11.3 SCREEN FOR STD (SEXUALLY TRANSMITTED DISEASE): ICD-10-CM

## 2017-06-28 PROCEDURE — 87591 N.GONORRHOEAE DNA AMP PROB: CPT | Performed by: PREVENTIVE MEDICINE

## 2017-06-28 PROCEDURE — 87491 CHLMYD TRACH DNA AMP PROBE: CPT | Performed by: PREVENTIVE MEDICINE

## 2017-06-30 LAB
C TRACH DNA SPEC QL NAA+PROBE: NORMAL
N GONORRHOEA DNA SPEC QL NAA+PROBE: NORMAL
SPECIMEN SOURCE: NORMAL
SPECIMEN SOURCE: NORMAL

## 2017-07-03 NOTE — PROGRESS NOTES
Mckayla,     Tests for Gonorrhea and Chlamydia are negative.    Please do not hesitate to call us at [phone] if you have any questions or concerns.    Thank you,    Shiloh Ochoa MD MPH

## 2017-09-19 ENCOUNTER — OFFICE VISIT (OUTPATIENT)
Dept: URGENT CARE | Facility: URGENT CARE | Age: 32
End: 2017-09-19
Payer: COMMERCIAL

## 2017-09-19 VITALS
HEART RATE: 95 BPM | TEMPERATURE: 98.7 F | SYSTOLIC BLOOD PRESSURE: 139 MMHG | DIASTOLIC BLOOD PRESSURE: 86 MMHG | OXYGEN SATURATION: 96 % | WEIGHT: 293 LBS | BODY MASS INDEX: 43.71 KG/M2

## 2017-09-19 DIAGNOSIS — N89.8 VAGINAL DISCHARGE: ICD-10-CM

## 2017-09-19 DIAGNOSIS — B96.89 BACTERIAL VAGINOSIS: ICD-10-CM

## 2017-09-19 DIAGNOSIS — N89.8 VAGINAL ODOR: Primary | ICD-10-CM

## 2017-09-19 DIAGNOSIS — N76.0 BACTERIAL VAGINOSIS: ICD-10-CM

## 2017-09-19 LAB
ALBUMIN UR-MCNC: NEGATIVE MG/DL
APPEARANCE UR: CLEAR
BILIRUB UR QL STRIP: NEGATIVE
COLOR UR AUTO: YELLOW
GLUCOSE UR STRIP-MCNC: NEGATIVE MG/DL
HGB UR QL STRIP: NEGATIVE
KETONES UR STRIP-MCNC: NEGATIVE MG/DL
LEUKOCYTE ESTERASE UR QL STRIP: NEGATIVE
NITRATE UR QL: NEGATIVE
PH UR STRIP: 6 PH (ref 5–7)
SOURCE: NORMAL
SP GR UR STRIP: 1.02 (ref 1–1.03)
SPECIMEN SOURCE: ABNORMAL
UROBILINOGEN UR STRIP-ACNC: 0.2 EU/DL (ref 0.2–1)
WET PREP SPEC: ABNORMAL

## 2017-09-19 PROCEDURE — 87210 SMEAR WET MOUNT SALINE/INK: CPT | Performed by: FAMILY MEDICINE

## 2017-09-19 PROCEDURE — 81003 URINALYSIS AUTO W/O SCOPE: CPT | Performed by: FAMILY MEDICINE

## 2017-09-19 PROCEDURE — 99213 OFFICE O/P EST LOW 20 MIN: CPT | Performed by: FAMILY MEDICINE

## 2017-09-19 RX ORDER — METRONIDAZOLE 500 MG/1
500 TABLET ORAL 2 TIMES DAILY
Qty: 14 TABLET | Refills: 0 | Status: SHIPPED | OUTPATIENT
Start: 2017-09-19 | End: 2017-09-26

## 2017-09-19 NOTE — MR AVS SNAPSHOT
After Visit Summary   9/19/2017    Mckayla Jeff    MRN: [id number]           Patient Information     Date Of Birth          1985        Visit Information        Provider Department      9/19/2017 7:20 PM Chris Wynn MD [facility]        Today's Diagnoses     Vaginal odor    -  1    Vaginal discharge        Bacterial vaginosis           Follow-ups after your visit        Who to contact     If you have questions or need follow up information about today's clinic visit or your schedule please contact [facility] directly at [phone].  Normal or non-critical lab and imaging results will be communicated to you by AchieveMinthart, letter or phone within 4 business days after the clinic has received the results. If you do not hear from us within 7 days, please contact the clinic through AchieveMinthart or phone. If you have a critical or abnormal lab result, we will notify you by phone as soon as possible.  Submit refill requests through Real Estate Direct or call your pharmacy and they will forward the refill request to us. Please allow 3 business days for your refill to be completed.          Additional Information About Your Visit        MyChart Information     Real Estate Direct gives you secure access to your electronic health record. If you see a primary care provider, you can also send messages to your care team and make appointments. If you have questions, please call your primary care clinic.  If you do not have a primary care provider, please call [phone] and they will assist you.        Care EveryWhere ID     This is your Care EveryWhere ID. This could be used by other organizations to access your [facility] medical records  [id number]        Your Vitals Were     Pulse Temperature Pulse Oximetry Breastfeeding? BMI (Body Mass Index)       95 98.7  F (37.1  C) (Oral) 96% No 43.71 kg/m2        Blood Pressure from Last 3 Encounters:   09/19/17 139/86   06/21/17 139/86    04/11/17 124/86    Weight from Last 3 Encounters:   09/19/17 (!) 313 lb 6 oz (142.1 kg)   06/21/17 (!) 306 lb (138.8 kg)   04/11/17 (!) 308 lb (139.7 kg)              We Performed the Following     *UA reflex to Microscopic and Culture ([place] and [facility] (except [place] and [place])     Wet prep          Today's Medication Changes          These changes are accurate as of: 9/19/17  8:31 PM.  If you have any questions, ask your nurse or doctor.               Start taking these medicines.        Dose/Directions    metroNIDAZOLE 500 MG tablet   Commonly known as:  FLAGYL   Used for:  Bacterial vaginosis   Started by:  Chris Wynn MD        Dose:  500 mg   Take 1 tablet (500 mg) by mouth 2 times daily for 7 days   Quantity:  14 tablet   Refills:  0            Where to get your medicines      These medications were sent to [facility] [address] [place] [address]     Phone:  [phone]     metroNIDAZOLE 500 MG tablet                Primary Care Provider Office Phone # Fax #    Urmila MELANIA Norton [phone]       [facility] [address]  [facility] [address]        Equal Access to Services     NOEMI BACON AH: Hadii adriana ku hadasho Soomaali, waaxda luqadaha, qaybta kaalmada adeegyada, waxay idiin haynyasia singh. So [facility] [phone].    ATENCIÓN: Si habla español, tiene a ramos disposición servicios gratuitos de asistencia lingüística. Llame al [phone].    We comply with applicable federal civil rights laws and [place] laws. We do not discriminate on the basis of race, color, national origin, age, disability sex, sexual orientation or gender identity.            Thank you!     Thank you for choosing [facility]  for your care. Our goal is always to provide you with excellent care. Hearing back from our patients is one way we can continue to improve our  services. Please take a few minutes to complete the written survey that you may receive in the mail after your visit with us. Thank you!             Your Updated Medication List - Protect others around you: Learn how to safely use, store and throw away your medicines at www.disposemymeds.org.          This list is accurate as of: 9/19/17  8:31 PM.  Always use your most recent med list.                   Brand Name Dispense Instructions for use Diagnosis    cyclobenzaprine 10 MG tablet    FLEXERIL    14 tablet    Take 1 tablet (10 mg) by mouth nightly as needed for muscle spasms    Acute right-sided thoracic back pain       losartan-hydrochlorothiazide 100-25 MG per tablet    HYZAAR    90 tablet    Take 1 tablet by mouth daily    Essential hypertension with goal blood pressure less than 140/90       metFORMIN 500 MG tablet    GLUCOPHAGE    270 tablet    Take 1 tablet (500 mg) by mouth 3 times daily (with meals)    PCOS (polycystic ovarian syndrome)       metroNIDAZOLE 500 MG tablet    FLAGYL    14 tablet    Take 1 tablet (500 mg) by mouth 2 times daily for 7 days    Bacterial vaginosis       naproxen 500 MG tablet    NAPROSYN    30 tablet    Take 1 tablet (500 mg) by mouth 2 times daily as needed for moderate pain    Acute right-sided thoracic back pain       potassium chloride SA 10 MEQ CR tablet    K-DUR/KLOR-CON M    90 tablet    Take 1 tablet (10 mEq) by mouth daily    Hypopotassemia

## 2017-09-19 NOTE — LETTER
[facility]  [address]  [facility] [address]  Phone: [phone]    September 19, 2017        Mckayla Jeff  [address] [place] [address]  [facility] [address]          To whom it may concern:    RE: Mckayla Jeff      Patient was seen and treated this evening at our clinic. She may return to work tomorrow without restrictions.       Sincerely,        Chris Wynn MD

## 2017-09-20 NOTE — NURSING NOTE
"Chief Complaint   Patient presents with     Vaginal Problem     \"foul odor\" per patient, shooting pains in back, brownish discharge, no burning on urination, some pelvic pain (pressure), present for a while       Initial /86 (BP Location: Left arm, Patient Position: Chair, Cuff Size: Adult Large)  Pulse 95  Temp 98.7  F (37.1  C) (Oral)  Wt (!) 313 lb 6 oz (142.1 kg)  SpO2 96%  Breastfeeding? No  BMI 43.71 kg/m2 Estimated body mass index is 43.71 kg/(m^2) as calculated from the following:    Height as of 6/21/17: 5' 11\" (1.803 m).    Weight as of this encounter: 313 lb 6 oz (142.1 kg).  Medication Reconciliation: complete   Li Gr MA    "

## 2017-09-20 NOTE — PROGRESS NOTES
Some of this note was populated by a medical assistant.      SUBJECTIVE:   Mckayla Jeff is a 31 year old female who presents to clinic today for the following health issues:      Vaginal Symptoms  Vaginal  Odor still present. Brown vaginal discharge.   Lumbar bilateral pain shooting up her back for a week. 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         Duration: For quite a while, feels her previous infection (bacterial vaginosis) from June and July never cleared up     Description  vaginal discharge - brownish color, itching, odor and pelvic pain    Intensity:  moderate    Accompanying signs and symptoms (fever/dysuria/abdominal or back pain): as indicated above    History  Sexually active: not at present has been at least 2 months.   Possibility of pregnancy: No  Recent antibiotic use: no     Precipitating or alleviating factors: None    Therapies tried and outcome: none   Outcome: n/a      Problem list and histories reviewed & adjusted, as indicated.  Additional history: as documented    Patient Active Problem List   Diagnosis     Hypertension goal BP (blood pressure) < 140/90     CARDIOVASCULAR SCREENING; LDL GOAL LESS THAN 160     PCOS (polycystic ovarian syndrome)     Obesity, Class III, BMI 40-49.9 (morbid obesity) (H)     Synovitis of knee     Internal derangement of knee     Adjustment disorder with mixed anxiety and depressed mood     Health Care Home     Past Surgical History:   Procedure Laterality Date     NO HISTORY OF SURGERY         Social History   Substance Use Topics     Smoking status: Never Smoker     Smokeless tobacco: Never Used     Alcohol use No     Family History   Problem Relation Age of Onset     Hypertension Mother      Obesity Mother      Depression Mother      Cancer - colorectal Father      DIABETES Maternal Aunt      Depression Maternal Aunt      Asthma No family hx of      CANCER No family hx of      Blood Disease No family hx of       Neurologic Disorder No family hx of      Eye Disorder No family hx of      Thyroid Disease No family hx of      HEART DISEASE No family hx of      Lipids No family hx of          Current Outpatient Prescriptions   Medication Sig Dispense Refill     potassium chloride SA (K-DUR/KLOR-CON M) 10 MEQ CR tablet Take 1 tablet (10 mEq) by mouth daily 90 tablet 1     losartan-hydrochlorothiazide (HYZAAR) 100-25 MG per tablet Take 1 tablet by mouth daily 90 tablet 3     metFORMIN (GLUCOPHAGE) 500 MG tablet Take 1 tablet (500 mg) by mouth 3 times daily (with meals) 270 tablet 3     cyclobenzaprine (FLEXERIL) 10 MG tablet Take 1 tablet (10 mg) by mouth nightly as needed for muscle spasms (Patient not taking: Reported on 9/19/2017) 14 tablet 1     naproxen (NAPROSYN) 500 MG tablet Take 1 tablet (500 mg) by mouth 2 times daily as needed for moderate pain (Patient not taking: Reported on 9/19/2017) 30 tablet 1     No Known Allergies      Reviewed and updated as needed this visit by clinical staffTobacco  Allergies  Meds       Reviewed and updated as needed this visit by Provider         ROS:  Constitutional, HEENT, cardiovascular, pulmonary, gi and gu systems are negative, except as otherwise noted.      OBJECTIVE:   /86 (BP Location: Left arm, Patient Position: Chair, Cuff Size: Adult Large)  Pulse 95  Temp 98.7  F (37.1  C) (Oral)  Wt (!) 313 lb 6 oz (142.1 kg)  SpO2 96%  Breastfeeding? No  BMI 43.71 kg/m2  Body mass index is 43.71 kg/(m^2).   GENERAL: healthy, alert and no distress  NECK: no adenopathy, no asymmetry, masses, or scars and thyroid normal to palpation  RESP: lungs clear to auscultation - no rales, rhonchi or wheezes  CV: regular rate and rhythm, normal S1 S2, no S3 or S4, no murmur, click or rub, no peripheral edema and peripheral pulses strong  ABDOMEN: soft, nontender, no hepatosplenomegaly, no masses and bowel sounds normal  MS: no gross musculoskeletal defects noted, no edema    Results for  orders placed or performed in visit on 09/19/17   *UA reflex to Microscopic and Culture ([place] [facility] (except [place] and [place])   Result Value Ref Range    Color Urine Yellow     Appearance Urine Clear     Glucose Urine Negative NEG^Negative mg/dL    Bilirubin Urine Negative NEG^Negative    Ketones Urine Negative NEG^Negative mg/dL    Specific Gravity Urine 1.020 1.003 - 1.035    Blood Urine Negative NEG^Negative    pH Urine 6.0 5.0 - 7.0 pH    Protein Albumin Urine Negative NEG^Negative mg/dL    Urobilinogen Urine 0.2 0.2 - 1.0 EU/dL    Nitrite Urine Negative NEG^Negative    Leukocyte Esterase Urine Negative NEG^Negative    Source Midstream Urine    Wet prep   Result Value Ref Range    Specimen Description Vagina     Wet Prep No Trichomonas seen     Wet Prep Clue cells seen (A)     Wet Prep No yeast seen         ASSESSMENT:       ICD-10-CM    1. Vaginal odor N89.8 *UA reflex to Microscopic and Culture ([place] [facility] (except [place] and [place])     Wet prep   2. Vaginal discharge N89.8 *UA reflex to Microscopic and Culture ([place] [facility] (except [place] and [place])     Wet prep   3. Bacterial vaginosis N76.0 metroNIDAZOLE (FLAGYL) 500 MG tablet    B96.89         PLAN:   Medicine as above.   Patient educational/instructional material provided including reasons for follow-up    The patient indicates understanding of these issues and agrees with the plan.  Chris Wynn MD        [facility]

## 2017-11-02 ENCOUNTER — TELEPHONE (OUTPATIENT)
Dept: FAMILY MEDICINE | Facility: CLINIC | Age: 32
End: 2017-11-02

## 2017-11-02 ENCOUNTER — DOCUMENTATION ONLY (OUTPATIENT)
Dept: FAMILY MEDICINE | Facility: CLINIC | Age: 32
End: 2017-11-02

## 2017-11-02 NOTE — TELEPHONE ENCOUNTER
What type of form? PHYSICAL EXAM FORM   What day did you drop off your forms? Thursday Nov 2nd 2017  Is there a due date? Unknown (7-10 business day to compete forms)   How would you like to receive these forms? Pt will  form   Provide?  Dinah Dillon   Which clinic was the form dropped off at? [place]   What is the best number to contact you? Pt can be reached at [phone]  What time works best to contact you with in 4 hrs?    Is it okay to leave a message? Yes     FORM ON LUIS Euceda, Patient Rep  [facility]

## 2017-11-02 NOTE — TELEPHONE ENCOUNTER
Patient had a physical with Luiza on 4/11/17. Bringing  Form to Luiza's office and routing message to Team   [facility].  Brittany Diallo MA/  For [facility]

## 2017-11-03 NOTE — TELEPHONE ENCOUNTER
This writer attempted to contact Mckayla on 11/03/17      Reason for call pt's form is completed and at the  for pickup and unable to leave message, mail box is full.      If patient calls back:   Relay message above, (read verbatim), document that pt called and close encounter.        Ronald Farley

## 2017-11-03 NOTE — TELEPHONE ENCOUNTER
Pt's form will be deliver to the  this afternoon for pickup after 1pm.  Ronald Farley,  For [facility]    Please call patient to let them know the above info.  And remind the person who is picking up to bring their photo ID.  Ronald Farley,  For [facility]     NOTE: If patient/gaurdian calls the clinic before the care teams gets a chance to call them, please let pt know the above information regarding pickup times, remind them to bring a photo ID and close the encounter.  Ronald Farley,  For [facility]    FYI:  Anything completed after 2:00p will not be delivered until the next business day after 11a.   Ronald Farley,  for Team[facility].

## 2017-11-06 NOTE — TELEPHONE ENCOUNTER
Reason for Call:  Other returning call    Detailed comments: will come in and get here forms    Call taken on 11/6/2017 at 12:50 PM by Madalyn Trotter

## 2017-11-20 ENCOUNTER — OFFICE VISIT (OUTPATIENT)
Dept: URGENT CARE | Facility: URGENT CARE | Age: 32
End: 2017-11-20
Payer: COMMERCIAL

## 2017-11-20 VITALS
OXYGEN SATURATION: 92 % | HEART RATE: 83 BPM | SYSTOLIC BLOOD PRESSURE: 161 MMHG | DIASTOLIC BLOOD PRESSURE: 92 MMHG | TEMPERATURE: 98.6 F | WEIGHT: 293 LBS | BODY MASS INDEX: 43.77 KG/M2

## 2017-11-20 DIAGNOSIS — R07.9 CHEST PAIN, UNSPECIFIED TYPE: Primary | ICD-10-CM

## 2017-11-20 PROCEDURE — 99213 OFFICE O/P EST LOW 20 MIN: CPT | Performed by: PHYSICIAN ASSISTANT

## 2017-11-20 ASSESSMENT — ENCOUNTER SYMPTOMS
HEADACHES: 0
SORE THROAT: 0
DIARRHEA: 0
CHILLS: 0
MYALGIAS: 0
COUGH: 0
VOMITING: 0
SHORTNESS OF BREATH: 0
WHEEZING: 0
SPUTUM PRODUCTION: 0
SINUS PAIN: 0
NAUSEA: 0
DIAPHORESIS: 0
FEVER: 0

## 2017-11-20 NOTE — MR AVS SNAPSHOT
After Visit Summary   11/20/2017    Mckayla Jeff    MRN: [id number]           Patient Information     Date Of Birth          1985        Visit Information        Provider Department      11/20/2017 11:45 AM Urmila Maynard PA-C [facility]        Today's Diagnoses     Chest pain, unspecified type    -  1       Follow-ups after your visit        Who to contact     If you have questions or need follow up information about today's clinic visit or your schedule please contact [facility] directly at [phone].  Normal or non-critical lab and imaging results will be communicated to you by Phantomhart, letter or phone within 4 business days after the clinic has received the results. If you do not hear from us within 7 days, please contact the clinic through Phantomhart or phone. If you have a critical or abnormal lab result, we will notify you by phone as soon as possible.  Submit refill requests through [employer] or call your pharmacy and they will forward the refill request to us. Please allow 3 business days for your refill to be completed.          Additional Information About Your Visit        MyChart Information     [employer] gives you secure access to your electronic health record. If you see a primary care provider, you can also send messages to your care team and make appointments. If you have questions, please call your primary care clinic.  If you do not have a primary care provider, please call [phone] and they will assist you.        Care EveryWhere ID     This is your Care EveryWhere ID. This could be used by other organizations to access your [place] medical records  [id number]        Your Vitals Were     Pulse Temperature Pulse Oximetry BMI (Body Mass Index)          83 98.6  F (37  C) (Oral) 92% 43.77 kg/m2         Blood Pressure from Last 3 Encounters:   11/20/17 (!) 161/92   09/19/17 139/86   06/21/17 139/86    Weight from Last 3  Encounters:   11/20/17 (!) 313 lb 12.8 oz (142.3 kg)   09/19/17 (!) 313 lb 6 oz (142.1 kg)   06/21/17 (!) 306 lb (138.8 kg)              Today, you had the following     No orders found for display       Primary Care Provider Office Phone # Fax #    MELANIA Reyes [phone]       [facility] [address]  [facility] [address]        Equal Access to Services     NOEMI BACON : Hadii aad ku hadasho Soomaali, waaxda luqadaha, qaybta kaalmada adeegyada, waxay idiin hayaan adeeg kharash celio . So [facility] [phone].    ATENCIÓN: Si habla español, tiene a ramos disposición servicios gratuitos de asistencia lingüística. Llame al [phone].    We comply with applicable federal civil rights laws and [place] laws. We do not discriminate on the basis of race, color, national origin, age, disability, sex, sexual orientation, or gender identity.            Thank you!     Thank you for choosing [facility]  for your care. Our goal is always to provide you with excellent care. Hearing back from our patients is one way we can continue to improve our services. Please take a few minutes to complete the written survey that you may receive in the mail after your visit with us. Thank you!             Your Updated Medication List - Protect others around you: Learn how to safely use, store and throw away your medicines at www.disposemymeds.org.          This list is accurate as of: 11/20/17  5:15 PM.  Always use your most recent med list.                   Brand Name Dispense Instructions for use Diagnosis    cyclobenzaprine 10 MG tablet    FLEXERIL    14 tablet    Take 1 tablet (10 mg) by mouth nightly as needed for muscle spasms    Acute right-sided thoracic back pain       losartan-hydrochlorothiazide 100-25 MG per tablet    HYZAAR    90 tablet    Take 1 tablet by mouth daily    Essential hypertension with goal blood pressure less than 140/90       metFORMIN 500 MG tablet     GLUCOPHAGE    270 tablet    Take 1 tablet (500 mg) by mouth 3 times daily (with meals)    PCOS (polycystic ovarian syndrome)       naproxen 500 MG tablet    NAPROSYN    30 tablet    Take 1 tablet (500 mg) by mouth 2 times daily as needed for moderate pain    Acute right-sided thoracic back pain       potassium chloride SA 10 MEQ CR tablet    K-DUR/KLOR-CON M    90 tablet    Take 1 tablet (10 mEq) by mouth daily    Hypopotassemia

## 2017-11-20 NOTE — PROGRESS NOTES
SUBJECTIVE:   Mckayla Jeff is a 32 year old female presenting with a chief complaint of   Chest pain, lefts ided. Pain comes and goes. Chest pain moderate right now.Her dad passed away from MI age 59. She has HTN, on meds    Onset of symptoms was 1 week(s) ago.  Course of illness is worsening.    Severity moderate  Current and Associated symptoms: lump under left breast  Treatment measures tried include None tried.  Predisposing factors include None.      Review of Systems   Constitutional: Negative for chills, diaphoresis, fever and malaise/fatigue.   HENT: Negative for congestion, ear discharge, ear pain, sinus pain and sore throat.    Respiratory: Negative for cough, sputum production, shortness of breath and wheezing.    Gastrointestinal: Negative for diarrhea, nausea and vomiting.   Musculoskeletal: Negative for myalgias.   Skin: Negative for rash.   Neurological: Negative for headaches.         Past Medical History:   Diagnosis Date     Contusion of knee 12/9/2014     Hypertension      Obesity      PCOS (polycystic ovarian syndrome)      Current Outpatient Prescriptions   Medication Sig Dispense Refill     potassium chloride SA (K-DUR/KLOR-CON M) 10 MEQ CR tablet Take 1 tablet (10 mEq) by mouth daily 90 tablet 1     losartan-hydrochlorothiazide (HYZAAR) 100-25 MG per tablet Take 1 tablet by mouth daily 90 tablet 3     metFORMIN (GLUCOPHAGE) 500 MG tablet Take 1 tablet (500 mg) by mouth 3 times daily (with meals) 270 tablet 3     cyclobenzaprine (FLEXERIL) 10 MG tablet Take 1 tablet (10 mg) by mouth nightly as needed for muscle spasms (Patient not taking: Reported on 9/19/2017) 14 tablet 1     naproxen (NAPROSYN) 500 MG tablet Take 1 tablet (500 mg) by mouth 2 times daily as needed for moderate pain (Patient not taking: Reported on 9/19/2017) 30 tablet 1     Social History   Substance Use Topics     Smoking status: Never Smoker     Smokeless tobacco: Never Used     Alcohol use No       OBJECTIVE  BP (!)  161/92 (BP Location: Left arm, Patient Position: Chair, Cuff Size: Adult Large)  Pulse 83  Temp 98.6  F (37  C) (Oral)  Wt (!) 313 lb 12.8 oz (142.3 kg)  SpO2 92%  BMI 43.77 kg/m2      Physical Exam   Constitutional: She is oriented to person, place, and time and well-developed, well-nourished, and in no distress. No distress.   HENT:   Right Ear: Tympanic membrane and ear canal normal.   Left Ear: Tympanic membrane and ear canal normal.   Mouth/Throat: Mucous membranes are not dry. No posterior oropharyngeal erythema or tonsillar abscesses.   Eyes: EOM are normal.   Neck: Neck supple.   Cardiovascular: Normal rate, regular rhythm and normal heart sounds.    Pulmonary/Chest: Effort normal and breath sounds normal. No respiratory distress. She has no wheezes. She has no rales.   Neurological: She is alert and oriented to person, place, and time. Gait normal.   Skin: Skin is warm and dry. She is not diaphoretic.   Psychiatric: Mood, memory, affect and judgment normal.         ASSESSMENT:      ICD-10-CM    1. Chest pain, unspecified type R07.9          PLAN:  With her significant FHX heart disease, her weight, HTN- should go to ER for eval of her chest pain as we cannot do troponin or D dimer. She will decide which ER and have a friend take her right now.    Urmila Maynard PA-C

## 2017-11-20 NOTE — NURSING NOTE
"Chief Complaint   Patient presents with     Shortness of Breath     Patient complains of shortness of breath and lump under left side of breast       Initial BP (!) 161/92 (BP Location: Left arm, Patient Position: Chair, Cuff Size: Adult Large)  Pulse 83  Temp 98.6  F (37  C) (Oral)  Wt (!) 313 lb 12.8 oz (142.3 kg)  SpO2 92%  BMI 43.77 kg/m2 Estimated body mass index is 43.77 kg/(m^2) as calculated from the following:    Height as of 6/21/17: 5' 11\" (1.803 m).    Weight as of this encounter: 313 lb 12.8 oz (142.3 kg).  Medication Reconciliation: complete       Octavia Merrill    "

## 2017-11-30 ENCOUNTER — OFFICE VISIT (OUTPATIENT)
Dept: FAMILY MEDICINE | Facility: CLINIC | Age: 32
End: 2017-11-30
Payer: COMMERCIAL

## 2017-11-30 VITALS — HEIGHT: 71 IN | SYSTOLIC BLOOD PRESSURE: 133 MMHG | DIASTOLIC BLOOD PRESSURE: 90 MMHG | HEART RATE: 93 BPM

## 2017-11-30 DIAGNOSIS — Z71.85 VACCINE COUNSELING: ICD-10-CM

## 2017-11-30 DIAGNOSIS — I10 ESSENTIAL HYPERTENSION WITH GOAL BLOOD PRESSURE LESS THAN 140/90: ICD-10-CM

## 2017-11-30 DIAGNOSIS — N63.0 LUMP OR MASS IN BREAST: ICD-10-CM

## 2017-11-30 DIAGNOSIS — E66.01 OBESITY, CLASS III, BMI 40-49.9 (MORBID OBESITY) (H): ICD-10-CM

## 2017-11-30 DIAGNOSIS — Z11.1 SCREENING EXAMINATION FOR PULMONARY TUBERCULOSIS: Primary | ICD-10-CM

## 2017-11-30 LAB — VZV IGG SER QL IA: 3.2 AI (ref 0–0.8)

## 2017-11-30 PROCEDURE — 86480 TB TEST CELL IMMUN MEASURE: CPT | Performed by: NURSE PRACTITIONER

## 2017-11-30 PROCEDURE — 36415 COLL VENOUS BLD VENIPUNCTURE: CPT | Performed by: NURSE PRACTITIONER

## 2017-11-30 PROCEDURE — 99214 OFFICE O/P EST MOD 30 MIN: CPT | Performed by: NURSE PRACTITIONER

## 2017-11-30 PROCEDURE — 86787 VARICELLA-ZOSTER ANTIBODY: CPT | Performed by: NURSE PRACTITIONER

## 2017-11-30 RX ORDER — AMLODIPINE BESYLATE 2.5 MG/1
2.5 TABLET ORAL DAILY
Qty: 30 TABLET | Refills: 1 | Status: SHIPPED | OUTPATIENT
Start: 2017-11-30 | End: 2018-05-23

## 2017-11-30 NOTE — MR AVS SNAPSHOT
After Visit Summary   11/30/2017    Mckayla Jeff    MRN: [id number]           Patient Information     Date Of Birth          1985        Visit Information        Provider Department      11/30/2017 9:20 AM Urmila Dillon APRN [facility]        Today's Diagnoses     Screening examination for pulmonary tuberculosis    -  1    Vaccine counseling        Lump or mass in breast          Care Instructions      Breast Lump, Uncertain Cause    A lump was found in your breast. Most breast lumps are not cancer. They may be caused by normal changes in the breast tissue due to hormone variations that occur with your menstrual cycle. Some women may form lumps that are painful and tender. Others may form lumps that are painless.  At this time, is not possible to be certain of the cause of your lump without further evaluation. This could include:    Another exam by your healthcare provider or a gynecologist    Imaging tests, such as a mammogram or ultrasound    Biopsy (procedure to remove small tissue samples from the breast lump)  Your healthcare provider will explain any additional testing that is needed. Be sure to get answers to any questions you may have.  Home care  Until a diagnosis is made, you may be advised to do the following:    If you are having breast pain:    Take an over-the-counter pain reliever, if directed to by your provider.    Wear a well-fitted bra or sports bra for extra support. If you have breast pain at night, try wearing the bra during sleep.    Apply a warm compress (towel soaked in warm water) to the breast. You may also use a hot water bottle.    Check your breasts each day. Keep a log of whether the lump seems to be changing in size or tenderness with your period. This can help your healthcare provider make the correct diagnosis.  Follow-up care  Follow up with your healthcare provider, or as directed. Keep all appointments. Also, prepare for any  upcoming tests as directed.  When to seek medical advice  Call your healthcare provider right away if any of these occur:    Fever of 100.4 F (38 C) or higher    Redness or swelling of the breast    Discharge from the nipple    Visible changes in the skin over the nipple or breast    Lump grows larger, feels very hard, or has an irregular shape    New lumps form  Date Last Reviewed: 3/1/2017    9000-4680 [employer]. [address] [place] [address]. All rights reserved. This information is not intended as a substitute for professional medical care. Always follow your healthcare professional's instructions.                Follow-ups after your visit        Future tests that were ordered for you today     Open Future Orders        Priority Expected Expires Ordered    US Breast Left Complete 4 Quadrants Routine  11/30/2018 11/30/2017    *MA Screening Digital Bilateral Routine  11/30/2018 11/30/2017            Who to contact     If you have questions or need follow up information about today's clinic visit or your schedule please contact [facility] directly at [phone].  Normal or non-critical lab and imaging results will be communicated to you by CodersClanhart, letter or phone within 4 business days after the clinic has received the results. If you do not hear from us within 7 days, please contact the clinic through Ciplext or phone. If you have a critical or abnormal lab result, we will notify you by phone as soon as possible.  Submit refill requests through Accept Software or call your pharmacy and they will forward the refill request to us. Please allow 3 business days for your refill to be completed.          Additional Information About Your Visit        CodersClanharPressmart Information     [employer] Software gives you secure access to your electronic health record. If you see a primary care provider, you can also send messages to your care team and make appointments. If you have questions, please call  "your primary care clinic.  If you do not have a primary care provider, please call [phone] and they will assist you.        Care EveryWhere ID     This is your Care EveryWhere ID. This could be used by other organizations to access your [place] medical records  [id number]        Your Vitals Were     Pulse Height                93 5' 11\" (1.803 m)           Blood Pressure from Last 3 Encounters:   11/30/17 133/90   11/20/17 (!) 161/92   09/19/17 139/86    Weight from Last 3 Encounters:   11/20/17 (!) 313 lb 12.8 oz (142.3 kg)   09/19/17 (!) 313 lb 6 oz (142.1 kg)   06/21/17 (!) 306 lb (138.8 kg)              We Performed the Following     M Tuberculosis by Quantiferon     Varicella Zoster Virus Antibody IgG        Primary Care Provider Office Phone # Fax #    Urmila LAKEISHA Dillon, MELANIA [phone]       [facility] [address]  [facility] [address]        Equal Access to Services     [facility]: Hadii aad ku hadasho Soomaali, waaxda luqadaha, qaybta kaalmada adeegyada, waxay bambi pickens . So [facility] [phone].    ATENCIÓN: Si habla español, tiene a ramos disposición servicios gratuitos de asistencia lingüística. Llame al [phone].    We comply with applicable federal civil rights laws and [place] laws. We do not discriminate on the basis of race, color, national origin, age, disability, sex, sexual orientation, or gender identity.            Thank you!     Thank you for choosing [facility]  for your care. Our goal is always to provide you with excellent care. Hearing back from our patients is one way we can continue to improve our services. Please take a few minutes to complete the written survey that you may receive in the mail after your visit with us. Thank you!             Your Updated Medication List - Protect others around you: Learn how to safely use, store and throw away your medicines at www.disposemymeds.org.          This " list is accurate as of: 11/30/17 10:20 AM.  Always use your most recent med list.                   Brand Name Dispense Instructions for use Diagnosis    losartan-hydrochlorothiazide 100-25 MG per tablet    HYZAAR    90 tablet    Take 1 tablet by mouth daily    Essential hypertension with goal blood pressure less than 140/90       metFORMIN 500 MG tablet    GLUCOPHAGE    270 tablet    Take 1 tablet (500 mg) by mouth 3 times daily (with meals)    PCOS (polycystic ovarian syndrome)       potassium chloride SA 10 MEQ CR tablet    K-DUR/KLOR-CON M    90 tablet    Take 1 tablet (10 mEq) by mouth daily    Hypopotassemia

## 2017-11-30 NOTE — PATIENT INSTRUCTIONS
Breast Lump, Uncertain Cause    A lump was found in your breast. Most breast lumps are not cancer. They may be caused by normal changes in the breast tissue due to hormone variations that occur with your menstrual cycle. Some women may form lumps that are painful and tender. Others may form lumps that are painless.  At this time, is not possible to be certain of the cause of your lump without further evaluation. This could include:    Another exam by your healthcare provider or a gynecologist    Imaging tests, such as a mammogram or ultrasound    Biopsy (procedure to remove small tissue samples from the breast lump)  Your healthcare provider will explain any additional testing that is needed. Be sure to get answers to any questions you may have.  Home care  Until a diagnosis is made, you may be advised to do the following:    If you are having breast pain:    Take an over-the-counter pain reliever, if directed to by your provider.    Wear a well-fitted bra or sports bra for extra support. If you have breast pain at night, try wearing the bra during sleep.    Apply a warm compress (towel soaked in warm water) to the breast. You may also use a hot water bottle.    Check your breasts each day. Keep a log of whether the lump seems to be changing in size or tenderness with your period. This can help your healthcare provider make the correct diagnosis.  Follow-up care  Follow up with your healthcare provider, or as directed. Keep all appointments. Also, prepare for any upcoming tests as directed.  When to seek medical advice  Call your healthcare provider right away if any of these occur:    Fever of 100.4 F (38 C) or higher    Redness or swelling of the breast    Discharge from the nipple    Visible changes in the skin over the nipple or breast    Lump grows larger, feels very hard, or has an irregular shape    New lumps form  Date Last Reviewed: 3/1/2017    6588-1088 [employer]. [address] [facility],  [place] [address]. All rights reserved. This information is not intended as a substitute for professional medical care. Always follow your healthcare professional's instructions.

## 2017-11-30 NOTE — PROGRESS NOTES
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)    Intensity: moderate    Progression of Symptoms:      Accompanying Signs & Symptoms:      Previous history of similar problem:       Precipitating factors:   Worsened by:     Alleviating factors:  Improved by:     Therapies Tried and outcome:   Patient due for TB screen-  No unexplained hoarseness  No loss of appetite  No unexplained weight loss  No productive or prolonged cough  No bloody sputum  Np persistent fever over 100 F  No night sweats  No hemoptysis  No shortness of breath  No unexplained fatigue or weakness  No chest pain  No recurrent pneumonia  No unprotected exposure to a known TB patient    Lump under left breast- noted initially in June but it went away.  Lump came back about 10 days ago, started feeling sore about 2 days ago. No abnormal nipple discharge, no erythema, skin changes, dimpling, no FH breast cancers.      Hypertension Follow-up      Outpatient blood pressures are being checked at home.  Results are 166/98.    Low Salt Diet: no added salt          Problem list and histories reviewed & adjusted, as indicated.  Additional history: as documented    Patient Active Problem List   Diagnosis     Essential hypertension with goal blood pressure less than 140/90     CARDIOVASCULAR SCREENING; LDL GOAL LESS THAN 160     PCOS (polycystic ovarian syndrome)     Obesity, Class III, BMI 40-49.9 (morbid obesity) (H)     Synovitis of knee     Internal derangement of knee     Adjustment disorder with mixed anxiety and depressed mood     Health Care Home     Past Surgical History:   Procedure Laterality Date     NO HISTORY OF SURGERY         Social History   Substance Use Topics     Smoking status: Never Smoker     Smokeless tobacco: Never Used     Alcohol use No      "Family History   Problem Relation Age of Onset     Hypertension Mother      Obesity Mother      Depression Mother      Cancer - colorectal Father      DIABETES Maternal Aunt      Depression Maternal Aunt      Asthma No family hx of      CANCER No family hx of      Blood Disease No family hx of      Neurologic Disorder No family hx of      Eye Disorder No family hx of      Thyroid Disease No family hx of      HEART DISEASE No family hx of      Lipids No family hx of          Current Outpatient Prescriptions   Medication Sig Dispense Refill     potassium chloride SA (K-DUR/KLOR-CON M) 10 MEQ CR tablet Take 1 tablet (10 mEq) by mouth daily 90 tablet 1     losartan-hydrochlorothiazide (HYZAAR) 100-25 MG per tablet Take 1 tablet by mouth daily 90 tablet 3     metFORMIN (GLUCOPHAGE) 500 MG tablet Take 1 tablet (500 mg) by mouth 3 times daily (with meals) 270 tablet 3     BP Readings from Last 3 Encounters:   11/30/17 133/90   11/20/17 (!) 161/92   09/19/17 139/86    Wt Readings from Last 3 Encounters:   11/20/17 (!) 313 lb 12.8 oz (142.3 kg)   09/19/17 (!) 313 lb 6 oz (142.1 kg)   06/21/17 (!) 306 lb (138.8 kg)                  Labs reviewed in EPIC        Reviewed and updated as needed this visit by clinical staffTobacco  Allergies  Meds  Med Hx  Surg Hx  Soc Hx      Reviewed and updated as needed this visit by Provider         ROS:  Constitutional, HEENT, cardiovascular, pulmonary, gi and gu systems are negative, except as otherwise noted.      OBJECTIVE:   /90 (BP Location: Right arm, Patient Position: Chair, Cuff Size: Adult Large)  Pulse 93  Ht 5' 11\" (1.803 m)  There is no height or weight on file to calculate BMI.  GENERAL: healthy, alert and no distress  EYES: Eyes grossly normal to inspection, PERRL and conjunctivae and sclerae normal  HENT: ear canals and TM's normal, nose and mouth without ulcers or lesions  NECK: no adenopathy, no asymmetry, masses, or scars and thyroid normal to palpation  RESP: " "lungs clear to auscultation - no rales, rhonchi or wheezes  BREAST: no palpable axillary masses or adenopathy and 2 cm circumscribed, nontender mass left breast 0600 axis, otherwise, no other breast masses, skin changes, nipple discharge.  CV: regular rate and rhythm, normal S1 S2, no S3 or S4, no murmur, click or rub, no peripheral edema and peripheral pulses strong  ABDOMEN: soft, nontender, no hepatosplenomegaly, no masses and bowel sounds normal  MS: no gross musculoskeletal defects noted, no edema    Diagnostic Test Results:  No results found for this or any previous visit (from the past 24 hour(s)).    ASSESSMENT/PLAN:         BMI:   Estimated body mass index is 43.77 kg/(m^2) as calculated from the following:    Height as of 6/21/17: 5' 11\" (1.803 m).    Weight as of 11/20/17: 313 lb 12.8 oz (142.3 kg).   Weight management plan: Discussed healthy diet and exercise guidelines and patient will follow up in 6 months in clinic to re-evaluate.        1. Screening examination for pulmonary tuberculosis    - M Tuberculosis by [employer]ifer[employer]    2. Vaccine counseling  - Varicella Zoster Virus Antibody IgG    3. Lump or mass in breast  Getting mammo, US.  - US Breast Left Complete 4 Quadrants; Future  - *MA Screening Digital Bilateral; Future    4. Obesity, Class III, BMI 40-49.9 (morbid obesity) (H)  Benefits of weight loss reviewed in detail, encouraged her to cut back on the carbohydrates in the diet, consume more fruits and vegetables, drink plenty of water, avoid fruit juices, sodas, get 150 min moderate exercise/week.  Recheck weight in 6 months.  She declines Nutrition referral at this time.      5. Essential hypertension with goal blood pressure less than 140/90  Adding Norvasc to Hyzaar 100-25, recheck BP in 1 month.  Low salt diet advised, encouraged weight loss, regular exercise.  - amLODIPine (NORVASC) 2.5 MG tablet; Take 1 tablet (2.5 mg) by mouth daily  Dispense: 30 tablet; Refill: 1    See Patient " Instructions    MELANIA Domingo CNP  [facility]

## 2017-11-30 NOTE — NURSING NOTE
"Chief Complaint   Patient presents with     Imm/Inj     Patient needs a TB test, and titer for chicken pox for school       Initial /90 (BP Location: Right arm, Patient Position: Chair, Cuff Size: Adult Large)  Pulse 93  Ht 5' 11\" (1.803 m) Estimated body mass index is 43.77 kg/(m^2) as calculated from the following:    Height as of 6/21/17: 5' 11\" (1.803 m).    Weight as of 11/20/17: 313 lb 12.8 oz (142.3 kg).  Medication Reconciliation: complete   Estella Cadena MA      "

## 2017-12-01 ENCOUNTER — TELEPHONE (OUTPATIENT)
Dept: FAMILY MEDICINE | Facility: CLINIC | Age: 32
End: 2017-12-01

## 2017-12-01 LAB
M TB TUBERC IFN-G BLD QL: NEGATIVE
M TB TUBERC IFN-G/MITOGEN IGNF BLD: 0 IU/ML

## 2017-12-01 NOTE — TELEPHONE ENCOUNTER
Re: referral for breast screening    The orders need to be changed from screening to diagnostic    Can you send that to the providers you referred her to    Call if questions / ok to leave voicemail  [phone]    Thank you

## 2017-12-18 ENCOUNTER — RADIANT APPOINTMENT (OUTPATIENT)
Dept: ULTRASOUND IMAGING | Facility: CLINIC | Age: 32
End: 2017-12-18
Attending: NURSE PRACTITIONER
Payer: COMMERCIAL

## 2017-12-18 ENCOUNTER — RADIANT APPOINTMENT (OUTPATIENT)
Dept: MAMMOGRAPHY | Facility: CLINIC | Age: 32
End: 2017-12-18
Attending: NURSE PRACTITIONER
Payer: COMMERCIAL

## 2017-12-18 DIAGNOSIS — N63.0 LUMP OR MASS IN BREAST: ICD-10-CM

## 2017-12-18 DIAGNOSIS — L98.8 SKIN LESION OF BREAST: Primary | ICD-10-CM

## 2017-12-18 PROCEDURE — G0204 DX MAMMO INCL CAD BI: HCPCS | Performed by: RADIOLOGY

## 2017-12-18 PROCEDURE — 76642 ULTRASOUND BREAST LIMITED: CPT | Mod: 50 | Performed by: RADIOLOGY

## 2017-12-23 ENCOUNTER — OFFICE VISIT (OUTPATIENT)
Dept: URGENT CARE | Facility: URGENT CARE | Age: 32
End: 2017-12-23
Payer: COMMERCIAL

## 2017-12-23 VITALS
OXYGEN SATURATION: 97 % | HEART RATE: 82 BPM | DIASTOLIC BLOOD PRESSURE: 92 MMHG | SYSTOLIC BLOOD PRESSURE: 153 MMHG | TEMPERATURE: 98.5 F

## 2017-12-23 DIAGNOSIS — L03.011 PARONYCHIA OF FINGER OF RIGHT HAND: Primary | ICD-10-CM

## 2017-12-23 PROCEDURE — 99213 OFFICE O/P EST LOW 20 MIN: CPT | Performed by: FAMILY MEDICINE

## 2017-12-23 NOTE — MR AVS SNAPSHOT
After Visit Summary   12/23/2017    Mckayla Jeff    MRN: [id number]           Patient Information     Date Of Birth          1985        Visit Information        Provider Department      12/23/2017 3:05 PM Spike Kumar MD [facility]        Today's Diagnoses     Paronychia of finger of right hand    -  1      Care Instructions      Paronychia of the Finger or Toe  Paronychia is an infection near a fingernail or toenail. It usually occurs when an opening in the cuticle or an ingrown toenail lets bacteria under the skin.  The infection will need to be drained if pus is present. If the infection has been caught early, you may need only antibiotic treatment. Healing will take about 1 to 2 weeks.  Home care  Follow these guidelines when caring for yourself at home:    Clean and soak the toe or finger. Do this 2 times a day for the first 3 days. To do so:    Soak your foot or hand in a tub of warm water for 5 minutes. Or hold your toe or finger under a faucet of warm running water for 5 minutes.    Clean any crust away with soap and water using a cotton swab.    Put antibiotic ointment on the infected area.    Change the dressing daily or any time it gets dirty.    If you were given antibiotics, take them as directed until they are all gone.    If your infection is on a toe, wear comfortable shoes with a lot of toe room. You can also wear open-toed sandals while your toe heals.    You may use over-the-counter medicine (acetaminophen or ibuprofen to help with pain, unless another medicine was prescribed. If you have chronic liver or kidney disease, talk with your healthcare provider before using these medicines. Also talk with your provider if you've had a stomach ulcer or GI (gastrointestinal) bleeding.  Prevention  The following can prevent paronychia:    Avoid cutting or playing with your cuticles at home.    Don't bite your nails.    Don't suck on your thumbs or  fingers.  Follow-up care  Follow up with your healthcare provider, or as advised.  When to seek medical advice  Call your healthcare provider right away if any of these occur:    Redness, pain, or swelling of the finger or toe gets worse    Red streaks in the skin leading away from the wound    Pus or fluid draining from the nail area    Fever of 100.4 F (38 C) or higher, or as directed by your provider  Date Last Reviewed: 8/1/2016 2000-2017 [employer]. [address] [place] [address]. All rights reserved. This information is not intended as a substitute for professional medical care. Always follow your healthcare professional's instructions.                Follow-ups after your visit        Who to contact     If you have questions or need follow up information about today's clinic visit or your schedule please contact [facility] directly at [phone].  Normal or non-critical lab and imaging results will be communicated to you by MyChart, letter or phone within 4 business days after the clinic has received the results. If you do not hear from us within 7 days, please contact the clinic through [employer] or phone. If you have a critical or abnormal lab result, we will notify you by phone as soon as possible.  Submit refill requests through [employer] or call your pharmacy and they will forward the refill request to us. Please allow 3 business days for your refill to be completed.          Additional Information About Your Visit        MyChart Information     [employer] gives you secure access to your electronic health record. If you see a primary care provider, you can also send messages to your care team and make appointments. If you have questions, please call your primary care clinic.  If you do not have a primary care provider, please call [phone] and they will assist you.        Care EveryWhere ID     This is your Care EveryWhere ID. This could be used by other  organizations to access your [place] medical records  [id number]        Your Vitals Were     Pulse Temperature Pulse Oximetry             82 98.5  F (36.9  C) (Oral) 97%          Blood Pressure from Last 3 Encounters:   12/23/17 (!) 153/92   11/30/17 133/90   11/20/17 (!) 161/92    Weight from Last 3 Encounters:   11/20/17 (!) 313 lb 12.8 oz (142.3 kg)   09/19/17 (!) 313 lb 6 oz (142.1 kg)   06/21/17 (!) 306 lb (138.8 kg)              Today, you had the following     No orders found for display         Today's Medication Changes          These changes are accurate as of: 12/23/17  3:35 PM.  If you have any questions, ask your nurse or doctor.               Start taking these medicines.        Dose/Directions    amoxicillin-clavulanate 875-125 MG per tablet   Commonly known as:  AUGMENTIN   Used for:  Paronychia of finger of right hand   Started by:  Spike Kumar MD        Dose:  1 tablet   Take 1 tablet by mouth 2 times daily   Quantity:  20 tablet   Refills:  0            Where to get your medicines      These medications were sent to [facility] [address] [place] [address] [address] [address]     Phone:  [phone]     amoxicillin-clavulanate 875-125 MG per tablet                Primary Care Provider Office Phone # Fax #    Urmila MELANIA Norton [phone]       [facility] [address]  [facility] [address]        Equal Access to Services     ADRIANA BACON AH: Hadii adriana ku hadasho Soomaali, waaxda luqadaha, qaybta kaalmada adeegyada, waxay idiin hayaan adeeg kharash la'aan . So [facility] [phone].    ATENCIÓN: Si habla español, tiene a ramos disposición servicios gratuitos de asistencia lingüística. Llame al [phone].    We comply with applicable federal civil rights laws and [place] laws. We do not discriminate on the basis of race, color, national origin, age, disability, sex, sexual orientation, or gender  identity.            Thank you!     Thank you for choosing [facility]  for your care. Our goal is always to provide you with excellent care. Hearing back from our patients is one way we can continue to improve our services. Please take a few minutes to complete the written survey that you may receive in the mail after your visit with us. Thank you!             Your Updated Medication List - Protect others around you: Learn how to safely use, store and throw away your medicines at www.disposemymeds.org.          This list is accurate as of: 12/23/17  3:35 PM.  Always use your most recent med list.                   Brand Name Dispense Instructions for use Diagnosis    amLODIPine 2.5 MG tablet    NORVASC    30 tablet    Take 1 tablet (2.5 mg) by mouth daily    Essential hypertension with goal blood pressure less than 140/90       amoxicillin-clavulanate 875-125 MG per tablet    AUGMENTIN    20 tablet    Take 1 tablet by mouth 2 times daily    Paronychia of finger of right hand       losartan-hydrochlorothiazide 100-25 MG per tablet    HYZAAR    90 tablet    Take 1 tablet by mouth daily    Essential hypertension with goal blood pressure less than 140/90       metFORMIN 500 MG tablet    GLUCOPHAGE    270 tablet    Take 1 tablet (500 mg) by mouth 3 times daily (with meals)    PCOS (polycystic ovarian syndrome)       potassium chloride SA 10 MEQ CR tablet    K-DUR/KLOR-CON M    90 tablet    Take 1 tablet (10 mEq) by mouth daily    Hypopotassemia

## 2017-12-23 NOTE — PROGRESS NOTES
SUBJECTIVE:   Mckayla Jeff is a 32 year old female who presents to clinic today for the following health issues:      Finger Infection      Duration: 2 days    Description (location/character/radiation): right hand middle finger    Intensity:  moderate    Accompanying signs and symptoms: pain, swelling, bruising, shooting pain in hand     History (similar episodes/previous evaluation): None    Precipitating or alleviating factors: None    Therapies tried and outcome: ice, tylenol    Works in hospice care          Problem list and histories reviewed & adjusted, as indicated.  Additional history: as documented    Patient Active Problem List   Diagnosis     Essential hypertension with goal blood pressure less than 140/90     CARDIOVASCULAR SCREENING; LDL GOAL LESS THAN 160     PCOS (polycystic ovarian syndrome)     Obesity, Class III, BMI 40-49.9 (morbid obesity) (H)     Synovitis of knee     Internal derangement of knee     Adjustment disorder with mixed anxiety and depressed mood     Health Care Home     Past Surgical History:   Procedure Laterality Date     NO HISTORY OF SURGERY         Social History   Substance Use Topics     Smoking status: Never Smoker     Smokeless tobacco: Never Used     Alcohol use No     Family History   Problem Relation Age of Onset     Hypertension Mother      Obesity Mother      Depression Mother      Cancer - colorectal Father      DIABETES Maternal Aunt      Depression Maternal Aunt      Asthma No family hx of      CANCER No family hx of      Blood Disease No family hx of      Neurologic Disorder No family hx of      Eye Disorder No family hx of      Thyroid Disease No family hx of      HEART DISEASE No family hx of      Lipids No family hx of          Current Outpatient Prescriptions   Medication Sig Dispense Refill     amLODIPine (NORVASC) 2.5 MG tablet Take 1 tablet (2.5 mg) by mouth daily 30 tablet 1     potassium chloride SA (K-DUR/KLOR-CON M) 10 MEQ CR tablet Take 1 tablet  (10 mEq) by mouth daily 90 tablet 1     losartan-hydrochlorothiazide (HYZAAR) 100-25 MG per tablet Take 1 tablet by mouth daily 90 tablet 3     metFORMIN (GLUCOPHAGE) 500 MG tablet Take 1 tablet (500 mg) by mouth 3 times daily (with meals) 270 tablet 3     No Known Allergies  Recent Labs   Lab Test  04/11/17   1629 07/13/16  11/30/15   1023   06/11/15   0927  04/29/15   1733  04/01/15   1114  06/10/14   1250  04/19/14   1018   A1C  6.0   --    --    --    --    --    --    --   5.5   LDL  77   --    --    --   96   --    --    --   100   HDL  44*   --    --    --   35*   --    --    --   32*   TRIG  188*   --    --    --   195*   --    --    --   143   ALT   --    --    --    --    --   36  34  43  29   CR  0.86  0.94  0.85   < >  0.96   --   1.01  0.79  0.85   GFRESTIMATED  77   --   78   < >  68   --   64  86  79   GFRESTBLACK  >90   GFR Calc     --   >90   GFR Calc     < >  82   --   78  >90  >90   POTASSIUM  3.3*  3.3  3.4   < >  3.5   --   3.4  3.3*  3.7   TSH   --    --    --    --    --    --    --    --   1.79    < > = values in this interval not displayed.      BP Readings from Last 3 Encounters:   12/23/17 (!) 153/92   11/30/17 133/90   11/20/17 (!) 161/92    Wt Readings from Last 3 Encounters:   11/20/17 (!) 313 lb 12.8 oz (142.3 kg)   09/19/17 (!) 313 lb 6 oz (142.1 kg)   06/21/17 (!) 306 lb (138.8 kg)                  Labs reviewed in EPIC          Reviewed and updated as needed this visit by clinical staff     Reviewed and updated as needed this visit by Provider         ROS:  Constitutional, HEENT, cardiovascular, pulmonary, gi and gu systems are negative, except as otherwise noted.      OBJECTIVE:   BP (!) 153/92 (BP Location: Left arm, Patient Position: Chair, Cuff Size: Adult Regular)  Pulse 82  Temp 98.5  F (36.9  C) (Oral)  SpO2 97%  There is no height or weight on file to calculate BMI.  GENERAL: healthy, alert and no distress  NECK: no adenopathy, no  asymmetry, masses, or scars and thyroid normal to palpation  RESP: lungs clear to auscultation - no rales, rhonchi or wheezes  CV: regular rate and rhythm, normal S1 S2, no S3 or S4, no murmur, click or rub, no peripheral edema and peripheral pulses strong  ABDOMEN: soft, nontender, no hepatosplenomegaly, no masses and bowel sounds normal  MS: acute paronychia without abscess involving right 3rd finger as shown below             ASSESSMENT/PLAN:         ICD-10-CM    1. Paronychia of finger of right hand L03.011 amoxicillin-clavulanate (AUGMENTIN) 875-125 MG per tablet    right 3rd       Discussed in detail differentials and further management. Symptoms are likely secondary to acute paronychia without abscess. Suggested to soak finger in warm water, topical bacitracin and will also cover with Augmentin. Instructed to follow if symptoms persist or worsen, would consider I&D if develops abscess. Recommended well hydration and over-the-counter analgesia. Written instructions/information provided. Patient understood and in agreement with the above plan. All questions are answered.         Patient Instructions     Paronychia of the Finger or Toe  Paronychia is an infection near a fingernail or toenail. It usually occurs when an opening in the cuticle or an ingrown toenail lets bacteria under the skin.  The infection will need to be drained if pus is present. If the infection has been caught early, you may need only antibiotic treatment. Healing will take about 1 to 2 weeks.  Home care  Follow these guidelines when caring for yourself at home:    Clean and soak the toe or finger. Do this 2 times a day for the first 3 days. To do so:    Soak your foot or hand in a tub of warm water for 5 minutes. Or hold your toe or finger under a faucet of warm running water for 5 minutes.    Clean any crust away with soap and water using a cotton swab.    Put antibiotic ointment on the infected area.    Change the dressing daily or any  time it gets dirty.    If you were given antibiotics, take them as directed until they are all gone.    If your infection is on a toe, wear comfortable shoes with a lot of toe room. You can also wear open-toed sandals while your toe heals.    You may use over-the-counter medicine (acetaminophen or ibuprofen to help with pain, unless another medicine was prescribed. If you have chronic liver or kidney disease, talk with your healthcare provider before using these medicines. Also talk with your provider if you've had a stomach ulcer or GI (gastrointestinal) bleeding.  Prevention  The following can prevent paronychia:    Avoid cutting or playing with your cuticles at home.    Don't bite your nails.    Don't suck on your thumbs or fingers.  Follow-up care  Follow up with your healthcare provider, or as advised.  When to seek medical advice  Call your healthcare provider right away if any of these occur:    Redness, pain, or swelling of the finger or toe gets worse    Red streaks in the skin leading away from the wound    Pus or fluid draining from the nail area    Fever of 100.4 F (38 C) or higher, or as directed by your provider  Date Last Reviewed: 8/1/2016 2000-2017 [employer]. [address] [place] [address], [place] [address]. All rights reserved. This information is not intended as a substitute for professional medical care. Always follow your healthcare professional's instructions.            Spike Kumar MD  [facility]

## 2017-12-23 NOTE — NURSING NOTE
"Chief Complaint   Patient presents with     Finger     Patient complains of infection in right hand middle finger       Initial BP (!) 153/92 (BP Location: Left arm, Patient Position: Chair, Cuff Size: Adult Regular)  Pulse 82  Temp 98.5  F (36.9  C) (Oral)  SpO2 97% Estimated body mass index is 43.77 kg/(m^2) as calculated from the following:    Height as of 6/21/17: 5' 11\" (1.803 m).    Weight as of 11/20/17: 313 lb 12.8 oz (142.3 kg).  Medication Reconciliation: complete       Octavia Merrill    "

## 2018-01-31 ENCOUNTER — RADIANT APPOINTMENT (OUTPATIENT)
Dept: GENERAL RADIOLOGY | Facility: CLINIC | Age: 33
End: 2018-01-31
Attending: NURSE PRACTITIONER
Payer: COMMERCIAL

## 2018-01-31 ENCOUNTER — OFFICE VISIT (OUTPATIENT)
Dept: URGENT CARE | Facility: URGENT CARE | Age: 33
End: 2018-01-31
Payer: COMMERCIAL

## 2018-01-31 VITALS
HEART RATE: 85 BPM | WEIGHT: 285 LBS | OXYGEN SATURATION: 96 % | DIASTOLIC BLOOD PRESSURE: 77 MMHG | TEMPERATURE: 97.3 F | BODY MASS INDEX: 39.75 KG/M2 | SYSTOLIC BLOOD PRESSURE: 113 MMHG

## 2018-01-31 DIAGNOSIS — S99.911A ANKLE INJURY, RIGHT, INITIAL ENCOUNTER: Primary | ICD-10-CM

## 2018-01-31 PROCEDURE — 99213 OFFICE O/P EST LOW 20 MIN: CPT | Performed by: NURSE PRACTITIONER

## 2018-01-31 PROCEDURE — 73610 X-RAY EXAM OF ANKLE: CPT | Mod: RT

## 2018-01-31 RX ORDER — IBUPROFEN 600 MG/1
600 TABLET, FILM COATED ORAL EVERY 6 HOURS PRN
Qty: 30 TABLET | Refills: 0 | Status: SHIPPED | OUTPATIENT
Start: 2018-01-31 | End: 2018-02-20

## 2018-01-31 ASSESSMENT — PAIN SCALES - GENERAL: PAINLEVEL: MODERATE PAIN (5)

## 2018-01-31 NOTE — LETTER
[facility]  [address]  [facility] [address]  Phone: [phone]    January 31, 2018        Mckayla Jeff  [address] [place] [address] [address]  [facility] [address]          To whom it may concern:    RE: Mckayla Jeff    Patient was seen and treated today at our clinic and missed work.  Patient may return to work on 2/1/2018 with no restrictions.    Please contact me for questions or concerns.      Sincerely,        Vielka Atkinson NP

## 2018-01-31 NOTE — MR AVS SNAPSHOT
After Visit Summary   1/31/2018    Mckayla Jeff    MRN: [id number]           Patient Information     Date Of Birth          1985        Visit Information        Provider Department      1/31/2018 6:00 PM Vielka Atkinson NP [facility]        Today's Diagnoses     Ankle injury, right, initial encounter    -  1      Care Instructions      Muscle Strain in the Extremities  A muscle strain is a stretching and tearing of muscle fibers. This causes pain, especially when you move that muscle. There may also be some swelling and bruising.  Home care    Keep the hurt area raised to reduce pain and swelling. This is especially important during the first 48 hours.    Apply an ice pack over the injured area for 15 to 20 minutes every 3 to 6 hours. You should do this for the first 24 to 48 hours. You can make an ice pack by filling a plastic bag that seals at the top with ice cubes and then wrapping it with a thin towel. Be careful not to injure your skin with the ice treatments. Ice should never be applied directly to skin. Continue the use of ice packs for relief of pain and swelling as needed. After 48 hours, apply heat (warm shower or warm bath) for 15 to 20 minutes several times a day, or alternate ice and heat.    You may use over-the-counter pain medicine to control pain, unless another medicine was prescribed. If you have chronic liver or kidney disease or ever had a stomach ulcer or GI bleeding, talk with your healthcare provider before using these medicines.    For leg strains: If crutches have been recommended, don t put full weight on the hurt leg until you can do so without pain. You can return to sports when you are able to hop and run on the injured leg without pain.  Follow-up care  Follow up with your healthcare provider, or as advised.  When to seek medical advice  Call your healthcare provider right away if any of these occur:    The toes of the injured leg  become swollen, cold, blue, numb, or tingly    Pain or swelling increases  Date Last Reviewed: 11/19/2015 2000-2017 [employer]. [address] [place] [address], [place] [address]. All rights reserved. This information is not intended as a substitute for professional medical care. Always follow your healthcare professional's instructions.                Follow-ups after your visit        Who to contact     If you have questions or need follow up information about today's clinic visit or your schedule please contact [facility] directly at [phone].  Normal or non-critical lab and imaging results will be communicated to you by [employer], letter or phone within 4 business days after the clinic has received the results. If you do not hear from us within 7 days, please contact the clinic through [employer] or phone. If you have a critical or abnormal lab result, we will notify you by phone as soon as possible.  Submit refill requests through [employer] or call your pharmacy and they will forward the refill request to us. Please allow 3 business days for your refill to be completed.          Additional Information About Your Visit        MyChart Information     [employer] gives you secure access to your electronic health record. If you see a primary care provider, you can also send messages to your care team and make appointments. If you have questions, please call your primary care clinic.  If you do not have a primary care provider, please call [phone] and they will assist you.        Care EveryWhere ID     This is your Care EveryWhere ID. This could be used by other organizations to access your [facility] medical records  [id number]        Your Vitals Were     Pulse Temperature Last Period Pulse Oximetry Breastfeeding? BMI (Body Mass Index)    85 97.3  F (36.3  C) (Oral) (Exact Date) 96% No 39.75 kg/m2       Blood Pressure from Last 3 Encounters:   01/31/18 113/77   12/23/17 (!) 153/92    11/30/17 133/90    Weight from Last 3 Encounters:   01/31/18 285 lb (129.3 kg)   11/20/17 (!) 313 lb 12.8 oz (142.3 kg)   09/19/17 (!) 313 lb 6 oz (142.1 kg)              We Performed the Following     XR Ankle Right G/E 3 Views          Today's Medication Changes          These changes are accurate as of 1/31/18  7:10 PM.  If you have any questions, ask your nurse or doctor.               Start taking these medicines.        Dose/Directions    ibuprofen 600 MG tablet   Commonly known as:  ADVIL/MOTRIN   Used for:  Ankle injury, right, initial encounter   Started by:  Vielka Atkinson NP        Dose:  600 mg   Take 1 tablet (600 mg) by mouth every 6 hours as needed for moderate pain   Quantity:  30 tablet   Refills:  0            Where to get your medicines      These medications were sent to [facility] [address] [place] [address] [address] [address]     Phone:  [phone]     ibuprofen 600 MG tablet                Primary Care Provider Office Phone # Fax #    MELANIA Reyes [phone]       [facility] [address]  [facility] [address]        Equal Access to Services     NOEMI BACON AH: Hadii adriana ku hadasho Soomaali, waaxda luqadaha, qaybta kaalmada adeegyada, waxay idiin haynyasia singh. So [facility] [phone].    ATENCIÓN: Si habla español, tiene a ramos disposición servicios gratuitos de asistencia lingüística. Llame al [phone].    We comply with applicable federal civil rights laws and [place] laws. We do not discriminate on the basis of race, color, national origin, age, disability, sex, sexual orientation, or gender identity.            Thank you!     Thank you for choosing [facility]  for your care. Our goal is always to provide you with excellent care. Hearing back from our patients is one way we can continue to improve our services. Please take a few minutes to complete the written  survey that you may receive in the mail after your visit with us. Thank you!             Your Updated Medication List - Protect others around you: Learn how to safely use, store and throw away your medicines at www.disposemymeds.org.          This list is accurate as of 1/31/18  7:10 PM.  Always use your most recent med list.                   Brand Name Dispense Instructions for use Diagnosis    amLODIPine 2.5 MG tablet    NORVASC    30 tablet    Take 1 tablet (2.5 mg) by mouth daily    Essential hypertension with goal blood pressure less than 140/90       ibuprofen 600 MG tablet    ADVIL/MOTRIN    30 tablet    Take 1 tablet (600 mg) by mouth every 6 hours as needed for moderate pain    Ankle injury, right, initial encounter       losartan-hydrochlorothiazide 100-25 MG per tablet    HYZAAR    90 tablet    Take 1 tablet by mouth daily    Essential hypertension with goal blood pressure less than 140/90       metFORMIN 500 MG tablet    GLUCOPHAGE    270 tablet    Take 1 tablet (500 mg) by mouth 3 times daily (with meals)    PCOS (polycystic ovarian syndrome)       potassium chloride SA 10 MEQ CR tablet    K-DUR/KLOR-CON M    90 tablet    Take 1 tablet (10 mEq) by mouth daily    Hypopotassemia

## 2018-02-01 NOTE — NURSING NOTE
"Chief Complaint   Patient presents with     Trauma     Pt c/o right ankle injury from twisting it this morning.        Initial /77 (BP Location: Left arm, Patient Position: Chair, Cuff Size: Adult Large)  Pulse 85  Temp 97.3  F (36.3  C) (Oral)  Wt 285 lb (129.3 kg)  LMP  (Exact Date)  SpO2 96%  Breastfeeding? No  BMI 39.75 kg/m2 Estimated body mass index is 39.75 kg/(m^2) as calculated from the following:    Height as of 11/30/17: 5' 11\" (1.803 m).    Weight as of this encounter: 285 lb (129.3 kg).  Medication Reconciliation: complete     Liliam Payton CMA (AAMA)      "

## 2018-02-01 NOTE — PROGRESS NOTES
SUBJECTIVE:   Mckayla Jeff is a 32 year old female who presents to clinic today for the following health issues:    Musculoskeletal problem/pain      Duration: today    Description  Location: right ankle    Intensity:  9/10-earlier today when happened; currently 5/10    Accompanying signs and symptoms: swelling    History  Previous similar problem: Unknown  Previous evaluation:  x-ray    Precipitating or alleviating factors:  Trauma or overuse: YES- pt was walking when she stepped on some ice and twisted her ankle.  Aggravating factors include: putting pressure on foot, walking    Therapies tried and outcome: elevating, ice, ibuprofen- some relief.       Past Medical History:   Diagnosis Date     Contusion of knee 12/9/2014     Hypertension      Obesity      PCOS (polycystic ovarian syndrome)      History   Smoking Status     Never Smoker   Smokeless Tobacco     Never Used       ROS:  GEN no fevers  SKIN no erythema  Musculoskeletal:  See HPI.      OBJECTIVE:  Blood pressure 113/77, pulse 85, temperature 97.3  F (36.3  C), temperature source Oral, weight 285 lb (129.3 kg), SpO2 96 %, not currently breastfeeding.  Patient is alert and NAD.  EYES: conjunctiva clear  Ankle Exam (right):  Inspection:bruising around the lateral malleolus  Palpation:tender over lateral malleolus  Cap refill intact.    Good doralis pedis.  Neurovascularly Intact Distally.     XR no fx/fb   Results for orders placed or performed in visit on 01/31/18 (from the past 24 hour(s))   XR Ankle Right G/E 3 Views    Narrative    ANKLE THREE VIEWS RIGHT  1/31/2018 6:36 PM     HISTORY: Twisted ankle on ice.     COMPARISON: None.      Impression    IMPRESSION: There is a well-corticated bony fragment along the distal  aspect of the medial malleolus that may represent a chronic fracture  deformity. No acute appearing fracture identified. Ankle mortise joint  appears intact. No significant soft tissue swelling. There is anterior  calcaneal  megan.    TIERRA GREEN MD       ASSESSMENT:    ICD-10-CM    1. Ankle injury, right, initial encounter S99.911A XR Ankle Right G/E 3 Views     ibuprofen (ADVIL/MOTRIN) 600 MG tablet         PLAN:  Ankle splint provided today.  OTC pain control as needed.  Ice, elevate, lowly increase activity level with active range of motion exercises encouraged.    Vielka Atkinson  [facility]  Family Nurse Practitoner

## 2018-02-01 NOTE — PATIENT INSTRUCTIONS
Muscle Strain in the Extremities  A muscle strain is a stretching and tearing of muscle fibers. This causes pain, especially when you move that muscle. There may also be some swelling and bruising.  Home care    Keep the hurt area raised to reduce pain and swelling. This is especially important during the first 48 hours.    Apply an ice pack over the injured area for 15 to 20 minutes every 3 to 6 hours. You should do this for the first 24 to 48 hours. You can make an ice pack by filling a plastic bag that seals at the top with ice cubes and then wrapping it with a thin towel. Be careful not to injure your skin with the ice treatments. Ice should never be applied directly to skin. Continue the use of ice packs for relief of pain and swelling as needed. After 48 hours, apply heat (warm shower or warm bath) for 15 to 20 minutes several times a day, or alternate ice and heat.    You may use over-the-counter pain medicine to control pain, unless another medicine was prescribed. If you have chronic liver or kidney disease or ever had a stomach ulcer or GI bleeding, talk with your healthcare provider before using these medicines.    For leg strains: If crutches have been recommended, don t put full weight on the hurt leg until you can do so without pain. You can return to sports when you are able to hop and run on the injured leg without pain.  Follow-up care  Follow up with your healthcare provider, or as advised.  When to seek medical advice  Call your healthcare provider right away if any of these occur:    The toes of the injured leg become swollen, cold, blue, numb, or tingly    Pain or swelling increases  Date Last Reviewed: 11/19/2015 2000-2017 [employer]. [address] [place] [address], [place] [address]. All rights reserved. This information is not intended as a substitute for professional medical care. Always follow your healthcare professional's instructions.

## 2018-02-20 ENCOUNTER — OFFICE VISIT (OUTPATIENT)
Dept: FAMILY MEDICINE | Facility: CLINIC | Age: 33
End: 2018-02-20
Payer: COMMERCIAL

## 2018-02-20 VITALS
DIASTOLIC BLOOD PRESSURE: 84 MMHG | OXYGEN SATURATION: 100 % | BODY MASS INDEX: 39.9 KG/M2 | SYSTOLIC BLOOD PRESSURE: 129 MMHG | TEMPERATURE: 97 F | HEART RATE: 73 BPM | HEIGHT: 71 IN | WEIGHT: 285 LBS

## 2018-02-20 DIAGNOSIS — Z11.3 SCREEN FOR STD (SEXUALLY TRANSMITTED DISEASE): Primary | ICD-10-CM

## 2018-02-20 DIAGNOSIS — E66.01 OBESITY, CLASS III, BMI 40-49.9 (MORBID OBESITY) (H): ICD-10-CM

## 2018-02-20 LAB
HBV SURFACE AG SERPL QL IA: NONREACTIVE
HCV AB SERPL QL IA: NONREACTIVE
HIV 1+2 AB+HIV1 P24 AG SERPL QL IA: NONREACTIVE
SPECIMEN SOURCE: NORMAL
T PALLIDUM IGG+IGM SER QL: NEGATIVE
WET PREP SPEC: NORMAL

## 2018-02-20 PROCEDURE — 86803 HEPATITIS C AB TEST: CPT | Performed by: PREVENTIVE MEDICINE

## 2018-02-20 PROCEDURE — 87210 SMEAR WET MOUNT SALINE/INK: CPT | Performed by: PREVENTIVE MEDICINE

## 2018-02-20 PROCEDURE — 87389 HIV-1 AG W/HIV-1&-2 AB AG IA: CPT | Performed by: PREVENTIVE MEDICINE

## 2018-02-20 PROCEDURE — 99213 OFFICE O/P EST LOW 20 MIN: CPT | Performed by: PREVENTIVE MEDICINE

## 2018-02-20 PROCEDURE — 87591 N.GONORRHOEAE DNA AMP PROB: CPT | Performed by: PREVENTIVE MEDICINE

## 2018-02-20 PROCEDURE — 87340 HEPATITIS B SURFACE AG IA: CPT | Performed by: PREVENTIVE MEDICINE

## 2018-02-20 PROCEDURE — 36415 COLL VENOUS BLD VENIPUNCTURE: CPT | Performed by: PREVENTIVE MEDICINE

## 2018-02-20 PROCEDURE — 87491 CHLMYD TRACH DNA AMP PROBE: CPT | Performed by: PREVENTIVE MEDICINE

## 2018-02-20 PROCEDURE — 86780 TREPONEMA PALLIDUM: CPT | Performed by: PREVENTIVE MEDICINE

## 2018-02-20 ASSESSMENT — PAIN SCALES - GENERAL: PAINLEVEL: NO PAIN (0)

## 2018-02-20 NOTE — PROGRESS NOTES
Mckayla,     Wet prep did not show any infections with yeast, trichomonas or bacterial vaginosis. Other labs are pending.     Please do not hesitate to call us at [phone] if you have any questions or concerns.    Thank you,    Shiloh Ochoa MD MPH

## 2018-02-20 NOTE — PROGRESS NOTES
Mckayla,     Test for Syphilis is negative. Other labs are pending.     Please do not hesitate to call us at [phone] if you have any questions or concerns.    Thank you,    Shiloh Ochoa MD MPH

## 2018-02-20 NOTE — PATIENT INSTRUCTIONS
At [facility], we strive to deliver an exceptional experience to you, every time we see you.  If you receive a survey in the mail, please send us back your thoughts. We really do value your feedback.    Based on your medical history, these are the current health maintenance/preventive care services that you are due for (some may have been done at this visit.)  There are no preventive care reminders to display for this patient.      Suggested websites for health information:  Www.[place].org : Up to date and easily searchable information on multiple topics.  Www.medlineplus.gov : medication info, interactive tutorials, watch real surgeries online  Www.familydoctor.org : good info from the Academy of Family Physicians  Www.cdc.gov : public health info, travel advisories, epidemics (H1N1)  Www.aap.org : children's health info, normal development, vaccinations  Www.health.[facility].[place].us : [place] dept of health, public health issues in [place], N1N1    Your care team:                            Family Medicine Internal Medicine   MD Triston Vegas MD Shantel Branch-Fleming, MD Katya Georgiev PA-C Megan Hill, APRN CNP    James Polanco MD Pediatrics   Gregor Brown, PADEJA Mccullough, CNP Corazon Maciel APRN CNP   MD Kristin Menendez MD Deborah Mielke, MD Kim Thein, APRN CNP      Clinic hours: Monday - Thursday 7 am-7 pm; Fridays 7 am-5 pm.   Urgent care: Monday - Friday 11 am-9 pm; Saturday and Sunday 9 am-5 pm.  Pharmacy : Monday -Thursday 8 am-8 pm; Friday 8 am-6 pm; Saturday and Sunday 9 am-5 pm.     Clinic: [phone]   Pharmacy: [phone]

## 2018-02-20 NOTE — PROGRESS NOTES
SUBJECTIVE:   Mckayla Jeff is a 32 year old female who presents to clinic today for the following health issues:        STD check with no sx's  -Unprotected intercourse about a month ago  -No dysuria, no vaginal discharge  -No genital rash    Problem list and histories reviewed & adjusted, as indicated.  Additional history: as documented    Patient Active Problem List   Diagnosis     Essential hypertension with goal blood pressure less than 140/90     CARDIOVASCULAR SCREENING; LDL GOAL LESS THAN 160     PCOS (polycystic ovarian syndrome)     Obesity, Class III, BMI 40-49.9 (morbid obesity) (H)     Synovitis of knee     Internal derangement of knee     Adjustment disorder with mixed anxiety and depressed mood     Health Care Home     Past Surgical History:   Procedure Laterality Date     NO HISTORY OF SURGERY         Social History   Substance Use Topics     Smoking status: Never Smoker     Smokeless tobacco: Never Used     Alcohol use No     Family History   Problem Relation Age of Onset     Hypertension Mother      Obesity Mother      Depression Mother      Cancer - colorectal Father      DIABETES Maternal Aunt      Depression Maternal Aunt      Asthma No family hx of      CANCER No family hx of      Blood Disease No family hx of      Neurologic Disorder No family hx of      Eye Disorder No family hx of      Thyroid Disease No family hx of      HEART DISEASE No family hx of      Lipids No family hx of          Current Outpatient Prescriptions   Medication Sig Dispense Refill     amLODIPine (NORVASC) 2.5 MG tablet Take 1 tablet (2.5 mg) by mouth daily 30 tablet 1     potassium chloride SA (K-DUR/KLOR-CON M) 10 MEQ CR tablet Take 1 tablet (10 mEq) by mouth daily 90 tablet 1     losartan-hydrochlorothiazide (HYZAAR) 100-25 MG per tablet Take 1 tablet by mouth daily 90 tablet 3     metFORMIN (GLUCOPHAGE) 500 MG tablet Take 1 tablet (500 mg) by mouth 3 times daily (with meals) 270 tablet 3     No Known  "Allergies  BP Readings from Last 3 Encounters:   02/20/18 129/84   01/31/18 113/77   12/23/17 (!) 153/92    Wt Readings from Last 3 Encounters:   02/20/18 285 lb (129.3 kg)   01/31/18 285 lb (129.3 kg)   11/20/17 (!) 313 lb 12.8 oz (142.3 kg)                  Labs reviewed in EPIC    Reviewed and updated as needed this visit by clinical staff  Allergies  Meds       Reviewed and updated as needed this visit by Provider         ROS:  Constitutional, HEENT, cardiovascular, pulmonary, gi and gu systems are negative, except as otherwise noted.    OBJECTIVE:                                                    /84  Pulse 73  Temp 97  F (36.1  C) (Oral)  Ht 5' 11\" (1.803 m)  Wt 285 lb (129.3 kg)  SpO2 100%  Breastfeeding? No  BMI 39.75 kg/m2  Body mass index is 39.75 kg/(m^2).  GENERAL APPEARANCE: healthy, alert and no distress  EYES: Eyes grossly normal to inspection and conjunctivae and sclerae normal  RESP: lungs clear to auscultation - no rales, rhonchi or wheezes  CV: regular rates and rhythm, normal S1 S2, no S3 or S4 and no murmur, click or rub  ABDOMEN: soft, non-tender  MS: extremities normal- no gross deformities noted  NEURO: Normal strength and tone, mentation intact and speech normal  PSYCH: mentation appears normal    Diagnostic test results:  Diagnostic Test Results:  No results found for this or any previous visit (from the past 24 hour(s)).     ASSESSMENT/PLAN:                                                    1. Screen for STD (sexually transmitted disease)  -Unprotected intercourse about a month ago  - HIV Antigen Antibody Combo  - Anti Treponema  - Chlamydia trachomatis PCR  - Neisseria gonorrhoeae PCR  - Hepatitis C antibody  - Hepatitis B surface antigen  - Wet prep    2. Obesity, Class III, BMI 40-49.9 (morbid obesity) (H)  -Has lost weight from 313 to 285 pounds      Follow up with Provider - will contact with results through My Chart, otherwise follow up with PCP as scheduled  "     Shiloh Ochoa MD MPH    [facility]

## 2018-02-20 NOTE — MR AVS SNAPSHOT
After Visit Summary   2/20/2018    Mckayla Jeff    MRN: [id number]           Patient Information     Date Of Birth          1985        Visit Information        Provider Department      2/20/2018 7:00 AM Shiloh Ochoa MD [facility]        Today's Diagnoses     Screen for STD (sexually transmitted disease)    -  1    Obesity, Class III, BMI 40-49.9 (morbid obesity) (H)          Care Instructions    At [facility], we strive to deliver an exceptional experience to you, every time we see you.  If you receive a survey in the mail, please send us back your thoughts. We really do value your feedback.    Based on your medical history, these are the current health maintenance/preventive care services that you are due for (some may have been done at this visit.)  There are no preventive care reminders to display for this patient.      Suggested websites for health information:  Www.Acacia : Up to date and easily searchable information on multiple topics.  Www.medlineplus.gov : medication info, interactive tutorials, watch real surgeries online  Www.familydoctor.org : good info from the Academy of Family Physicians  Www.cdc.gov : public health info, travel advisories, epidemics (H1N1)  Www.aap.org : children's health info, normal development, vaccinations  Www.health.state.[place].us : [place] dept of health, public health issues in [place], N1N1    Your care team:                            Family Medicine Internal Medicine   MD Triston Vegas MD Shantel Branch-Fleming, MD Katya Georgiev PA-C Megan Hill, APRN CNP Nam Ho, MD Pediatrics   BERT Romero, BRUNO Maciel APRN MD Kristin Aguilar MD Deborah Mielke, MD Kim Thein, APRN [facility]      Clinic hours: Monday - Thursday 7 am-7 pm; Fridays 7 am-5 pm.   Urgent care: Monday - Friday 11 am-9 pm; Saturday and Sunday 9 am-5 pm.  Pharmacy : Monday  "-Thursday 8 am-8 pm; Friday 8 am-6 pm; Saturday and Sunday 9 am-5 pm.     Clinic: [phone]   Pharmacy: [phone]                Follow-ups after your visit        Who to contact     If you have questions or need follow up information about today's clinic visit or your schedule please contact [facility] KARINA RIZVI directly at [phone].  Normal or non-critical lab and imaging results will be communicated to you by [employer], letter or phone within 4 business days after the clinic has received the results. If you do not hear from us within 7 days, please contact the clinic through [employer] or phone. If you have a critical or abnormal lab result, we will notify you by phone as soon as possible.  Submit refill requests through [employer] or call your pharmacy and they will forward the refill request to us. Please allow 3 business days for your refill to be completed.          Additional Information About Your Visit        [employer] Information     [employer] gives you secure access to your electronic health record. If you see a primary care provider, you can also send messages to your care team and make appointments. If you have questions, please call your primary care clinic.  If you do not have a primary care provider, please call [phone] and they will assist you.        Care EveryWhere ID     This is your Care EveryWhere ID. This could be used by other organizations to access your [facility] medical records  [id number]        Your Vitals Were     Pulse Temperature Height Pulse Oximetry Breastfeeding? BMI (Body Mass Index)    73 97  F (36.1  C) (Oral) 5' 11\" (1.803 m) 100% No 39.75 kg/m2       Blood Pressure from Last 3 Encounters:   02/20/18 129/84   01/31/18 113/77   12/23/17 (!) 153/92    Weight from Last 3 Encounters:   02/20/18 285 lb (129.3 kg)   01/31/18 285 lb (129.3 kg)   11/20/17 (!) 313 lb 12.8 oz (142.3 kg)              We Performed the Following     Anti Treponema     Chlamydia " trachomatis PCR     Hepatitis B surface antigen     Hepatitis C antibody     HIV Antigen Antibody Combo     Neisseria gonorrhoeae PCR     Wet prep          Today's Medication Changes          These changes are accurate as of 2/20/18  7:28 AM.  If you have any questions, ask your nurse or doctor.               Stop taking these medicines if you haven't already. Please contact your care team if you have questions.     ibuprofen 600 MG tablet   Commonly known as:  ADVIL/MOTRIN   Stopped by:  Shiloh Ochoa MD                    Primary Care Provider Office Phone # Fax #    Urmila MELANIA Norton [phone]       [facility] [address]  [facility] [address]        Equal Access to Services     [facility] Hospital[facility]KEIS[facility] : Hadii aad ku hadasho Soomaali, waaxda luqadaha, qaybta kaalmada adeegyada, roque pickens . So [facility] [phone].    ATENCIÓN: Si habla español, tiene a ramos disposición servicios gratuitos de asistencia lingüística. Llame al [phone].    We comply with applicable federal civil rights laws and [place] laws. We do not discriminate on the basis of race, color, national origin, age, disability, sex, sexual orientation, or gender identity.            Thank you!     Thank you for choosing [facility]  for your care. Our goal is always to provide you with excellent care. Hearing back from our patients is one way we can continue to improve our services. Please take a few minutes to complete the written survey that you may receive in the mail after your visit with us. Thank you!             Your Updated Medication List - Protect others around you: Learn how to safely use, store and throw away your medicines at www.disposemymeds.org.          This list is accurate as of 2/20/18  7:28 AM.  Always use your most recent med list.                   Brand Name Dispense Instructions for use Diagnosis    amLODIPine 2.5 MG tablet    NORVASC    30 tablet     Take 1 tablet (2.5 mg) by mouth daily    Essential hypertension with goal blood pressure less than 140/90       losartan-hydrochlorothiazide 100-25 MG per tablet    HYZAAR    90 tablet    Take 1 tablet by mouth daily    Essential hypertension with goal blood pressure less than 140/90       metFORMIN 500 MG tablet    GLUCOPHAGE    270 tablet    Take 1 tablet (500 mg) by mouth 3 times daily (with meals)    PCOS (polycystic ovarian syndrome)       potassium chloride SA 10 MEQ CR tablet    K-DUR/KLOR-CON M    90 tablet    Take 1 tablet (10 mEq) by mouth daily    Hypopotassemia

## 2018-02-21 LAB
C TRACH DNA SPEC QL NAA+PROBE: NEGATIVE
N GONORRHOEA DNA SPEC QL NAA+PROBE: NEGATIVE
SPECIMEN SOURCE: NORMAL
SPECIMEN SOURCE: NORMAL

## 2018-02-21 NOTE — PROGRESS NOTES
Mckayla,     Tests for HIV, Hepatitis C and Hepatitis B are negative.     Please do not hesitate to call us at [phone] if you have any questions or concerns.    Thank you,    Shiloh Ochoa MD MPH

## 2018-02-21 NOTE — PROGRESS NOTES
Mckayla,     Tests for Chlamydia or Gonorrhea are negative.     Please do not hesitate to call us at [phone] if you have any questions or concerns.    Thank you,    Shiloh Ochoa MD MPH

## 2018-02-23 ENCOUNTER — RADIANT APPOINTMENT (OUTPATIENT)
Dept: GENERAL RADIOLOGY | Facility: CLINIC | Age: 33
End: 2018-02-23
Attending: NURSE PRACTITIONER
Payer: OTHER MISCELLANEOUS

## 2018-02-23 ENCOUNTER — OFFICE VISIT (OUTPATIENT)
Dept: FAMILY MEDICINE | Facility: CLINIC | Age: 33
End: 2018-02-23
Payer: OTHER MISCELLANEOUS

## 2018-02-23 VITALS
BODY MASS INDEX: 38.77 KG/M2 | DIASTOLIC BLOOD PRESSURE: 72 MMHG | WEIGHT: 278 LBS | HEART RATE: 84 BPM | OXYGEN SATURATION: 94 % | TEMPERATURE: 97.7 F | SYSTOLIC BLOOD PRESSURE: 104 MMHG

## 2018-02-23 DIAGNOSIS — M25.562 ACUTE PAIN OF LEFT KNEE: Primary | ICD-10-CM

## 2018-02-23 PROCEDURE — 73560 X-RAY EXAM OF KNEE 1 OR 2: CPT | Mod: LT

## 2018-02-23 PROCEDURE — 99213 OFFICE O/P EST LOW 20 MIN: CPT | Performed by: NURSE PRACTITIONER

## 2018-02-23 NOTE — PROGRESS NOTES
SUBJECTIVE:   Mckayla Jeff is a 32 year old female who presents to clinic today for the following health issues:  Musculoskeletal problem/pain      Duration: 2/22/18    Description  Location: Left knee    Intensity:  mild    Accompanying signs and symptoms: swelling this morning not now, 1 hour ago started burning right above kneecap    History  Previous similar problem: YES  Previous evaluation:  x-ray    Precipitating or alleviating factors:  Trauma or overuse: no   Aggravating factors include: sitting, walking    Therapies tried and outcome: ice and Tylenol- helped slightly      32 year old female presents with the above complaints. Twisting knee yesterday when she slipped on ice. 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 No fall. [place] pop in knee. No bruising or erythema. Pain is worsening, mostly inferior to patella and pre-patellar on medial side. Able to bear weight but painful. Knee feels like it will give out today. No locking. Similar injury in 6194-1070 with negative MRI. Has tried propping it up on a pillow, ice packs, Bengay and tylenol. This is a workmans comp injury.    Problem list and histories reviewed & adjusted, as indicated.  Additional history: as documented      Reviewed and updated as needed this visit by clinical staff  Tobacco  Allergies  Meds  Problems  Med Hx  Surg Hx  Fam Hx  Soc Hx        Reviewed and updated as needed this visit by Provider  Allergies  Meds  Problems         ROS:  Constitutional, HEENT, cardiovascular, pulmonary, gi and gu systems are negative, except as otherwise noted.    OBJECTIVE:     /72 (BP Location: Left arm, Patient Position: Chair, Cuff Size: Adult Large)  Pulse 84  Temp 97.7  F (36.5  C) (Oral)  Wt 278 lb (126.1 kg)  SpO2 94%  BMI 38.77 kg/m2  Body mass index is 38.77 kg/(m^2).  GENERAL: healthy, alert and no distress  SKIN: no suspicious lesions or rashes. No bruising or erythema.  MS: Mild swelling to left knee. Medial  joint line tenderness. No lateral joint line tenderness. No tenderness to patella itself. Able to bend knee past 90 degrees. Very painful to straighten. Difficult to perform testing due to pain. Calves not tender. Neurovascularly intact distally. Cap refill <2 sec.  PSYCH: mentation appears normal, affect normal/bright    Diagnostic Test Results:  Xray - left knee: IMPRESSION: Mild tricompartmental left knee osteophytosis without  significant joint space loss. No fractures are seen. No significant  soft tissue swelling or knee effusion.    ASSESSMENT/PLAN:   1. Acute pain of left knee  - CRUTCHES, UNDERARM WOOD  - ORTHO  REFERRAL  - XR Knee Standing Left 2 Views  - order for DME; Equipment being ordered: knee brace  Dispense: 1 Units; Refill: 0  Rest  Use crutches and brace for support. Non-weight bearing until you see orthopedics  Ice 15 minutes 4-6 times daily  Ibuprofen: 400-800 mg every 8 hours with food as needed for pain. No more than 3200 mg in 24 hours.  Tylenol: 650-1000 mg every 6-8 hours as needed for pain. No more than 4000mg in 24 hours.  Elevate on pillows  Follow up with orthopedics next week  Work note provided    See Patient Instructions    MELANIA Morton [facility]

## 2018-02-23 NOTE — LETTER
February 23, 2018      Mckayla Jeff  [address] [address]        To Whom It May Concern:    Mckayla Jeff was seen in our clinic. She may return to work with the following: limited to light duty - no bending/stooping and no climbing. Should do sedentary work until cleared by ortho. No home visits.       Sincerely,        MELANIA Morton CNP

## 2018-02-23 NOTE — PATIENT INSTRUCTIONS
Rest  Use crutches and brace for support. Non-weight bearing until you see orthopedics  Ice 15 minutes 4-6 times daily  Ibuprofen: 400-800 mg every 8 hours with food as needed for pain. No more than 3200 mg in 24 hours.  Tylenol: 650-1000 mg every 6-8 hours as needed for pain. No more than 4000mg in 24 hours.  Elevate on pillows  Follow up with orthopedics next week        Reducing Knee Pain and Swelling    Many treatments can help reduce pain and swelling in your knee. Your healthcare provider or physical therapist may suggest one or more of the following treatments:    Icing your knee helps reduce swelling. You may be asked to ice your knee once a day or more. Apply ice for about 15 to 20 minutes at a time, with at least 40 minutes between sessions. Always keep a towel between the ice and your skin.     Keeping your leg raised above your heart helps excess fluid flow out of your knee joint. This reduces swelling.    Compression means wrapping an elastic bandage or neoprene sleeve snugly around your knees. This keeps fluid from collecting in your knee joint.    Electrical stimulation, done by a physical therapist or , can help reduce excess fluid in your knee joint.    Anti-inflammatory medicines may be prescribed by your healthcare provider. You may take pills or receive injections in your knee.    Isometric (rui) exercises strengthen the muscles that support your knee joint. They also help reduce excess fluid in your knee.    Massage helps fluid drain away from your knee.  Date Last Reviewed: 10/13/2015    0820-6318 The [employer]. [address] [place] [address], [place] [address]. All rights reserved. This information is not intended as a substitute for professional medical care. Always follow your healthcare professional's instructions.

## 2018-02-23 NOTE — NURSING NOTE
Pt received crutches (CRTL,),and  brace ([id number],) in clinic. Left wearing it. Kathleen Olivares CMA

## 2018-02-23 NOTE — MR AVS SNAPSHOT
After Visit Summary   2/23/2018    Mckayla Jeff    MRN: [id number]           Patient Information     Date Of Birth          1985        Visit Information        Provider Department      2/23/2018 11:00 AM Lyn Dickens APRN [facility]        Today's Diagnoses     Acute pain of left knee    -  1      Care Instructions    Rest  Use crutches and brace for support. Non-weight bearing until you see orthopedics  Ice 15 minutes 4-6 times daily  Ibuprofen: 400-800 mg every 8 hours with food as needed for pain. No more than 3200 mg in 24 hours.  Tylenol: 650-1000 mg every 6-8 hours as needed for pain. No more than 4000mg in 24 hours.  Elevate on pillows  Follow up with orthopedics next week        Reducing Knee Pain and Swelling    Many treatments can help reduce pain and swelling in your knee. Your healthcare provider or physical therapist may suggest one or more of the following treatments:    Icing your knee helps reduce swelling. You may be asked to ice your knee once a day or more. Apply ice for about 15 to 20 minutes at a time, with at least 40 minutes between sessions. Always keep a towel between the ice and your skin.     Keeping your leg raised above your heart helps excess fluid flow out of your knee joint. This reduces swelling.    Compression means wrapping an elastic bandage or neoprene sleeve snugly around your knees. This keeps fluid from collecting in your knee joint.    Electrical stimulation, done by a physical therapist or , can help reduce excess fluid in your knee joint.    Anti-inflammatory medicines may be prescribed by your healthcare provider. You may take pills or receive injections in your knee.    Isometric (rui) exercises strengthen the muscles that support your knee joint. They also help reduce excess fluid in your knee.    Massage helps fluid drain away from your knee.  Date Last Reviewed: 10/13/2015    7158-9937 The  [employer]. [address] [place] [address], [place] [address]. All rights reserved. This information is not intended as a substitute for professional medical care. Always follow your healthcare professional's instructions.                Follow-ups after your visit        Additional Services     ORTHO  REFERRAL       [facility] is referring you to the Orthopedic  Services at [facility].       The  Representative will assist you in the coordination of your Orthopedic and Musculoskeletal Care as prescribed by your physician.    The  Representative will call you within 1 business day to help schedule your appointment, or you may contact the  Representative at:    All areas ~ [phone]     Type of Referral : Surgical / Specialist - patient has seen Dr. Abernathy in the past      Timeframe requested: 3 - 5 days    Coverage of these services is subject to the terms and limitations of your health insurance plan.  Please call member services at your health plan with any benefit or coverage questions.      If X-rays, CT or MRI's have been performed, please contact the facility where they were done to arrange for , prior to your scheduled appointment.  Please bring this referral request to your appointment and present it to your specialist.                  Who to contact     If you have questions or need follow up information about today's clinic visit or your schedule please contact [facility]AR[facility] [place] directly at [phone].  Normal or non-critical lab and imaging results will be communicated to you by MyChart, letter or phone within 4 business days after the clinic has received the results. If you do not hear from us within 7 days, please contact the clinic through MyChart or phone. If you have a critical or abnormal lab result, we will notify you by phone as soon as possible.  Submit refill requests through  [employer] or call your pharmacy and they will forward the refill request to us. Please allow 3 business days for your refill to be completed.          Additional Information About Your Visit        [employer] Information     [employer] gives you secure access to your electronic health record. If you see a primary care provider, you can also send messages to your care team and make appointments. If you have questions, please call your primary care clinic.  If you do not have a primary care provider, please call [phone] and they will assist you.        Care EveryWhere ID     This is your Care EveryWhere ID. This could be used by other organizations to access your [facility] medical records  [id number]        Your Vitals Were     Pulse Temperature Pulse Oximetry BMI (Body Mass Index)          84 97.7  F (36.5  C) (Oral) 94% 38.77 kg/m2         Blood Pressure from Last 3 Encounters:   02/23/18 104/72   02/20/18 129/84   01/31/18 113/77    Weight from Last 3 Encounters:   02/23/18 278 lb (126.1 kg)   02/20/18 285 lb (129.3 kg)   01/31/18 285 lb (129.3 kg)              We Performed the Following     CRUTCHES, UNDERARM WOOD     ORTHO  REFERRAL     XR Knee Standing Left 2 Views          Today's Medication Changes          These changes are accurate as of 2/23/18 11:26 AM.  If you have any questions, ask your nurse or doctor.               Start taking these medicines.        Dose/Directions    order for DME   Used for:  Acute pain of left knee   Started by:  Lyn Dickens APRN CNP        Equipment being ordered: knee brace   Quantity:  1 Units   Refills:  0            Where to get your medicines      Some of these will need a paper prescription and others can be bought over the counter.  Ask your nurse if you have questions.     Bring a paper prescription for each of these medications     order for DME                Primary Care Provider Office Phone # Fax #    MELANIA Reyes [phone]  [phone]       [facility] [address]  [facility] [address]        Equal Access to Services     NOEMI BACON : Hadii adriana cantu hadgarcíao Soomaali, waaxda luqadaha, qaybta kaalmada adeegyada, roque lacy eriklicha evanswerner reynaangeliaanca singh. So [facility] [phone].    ATENCIÓN: Si habla español, tiene a ramos disposición servicios gratuitos de asistencia lingüística. Sebastianame al [phone].    We comply with applicable federal civil rights laws and [place] laws. We do not discriminate on the basis of race, color, national origin, age, disability, sex, sexual orientation, or gender identity.            Thank you!     Thank you for choosing [facility]  for your care. Our goal is always to provide you with excellent care. Hearing back from our patients is one way we can continue to improve our services. Please take a few minutes to complete the written survey that you may receive in the mail after your visit with us. Thank you!             Your Updated Medication List - Protect others around you: Learn how to safely use, store and throw away your medicines at www.disposemymeds.org.          This list is accurate as of 2/23/18 11:26 AM.  Always use your most recent med list.                   Brand Name Dispense Instructions for use Diagnosis    amLODIPine 2.5 MG tablet    NORVASC    30 tablet    Take 1 tablet (2.5 mg) by mouth daily    Essential hypertension with goal blood pressure less than 140/90       losartan-hydrochlorothiazide 100-25 MG per tablet    HYZAAR    90 tablet    Take 1 tablet by mouth daily    Essential hypertension with goal blood pressure less than 140/90       metFORMIN 500 MG tablet    GLUCOPHAGE    270 tablet    Take 1 tablet (500 mg) by mouth 3 times daily (with meals)    PCOS (polycystic ovarian syndrome)       order for DME     1 Units    Equipment being ordered: knee brace    Acute pain of left knee       potassium chloride SA 10 MEQ CR tablet    K-DUR/KLOR-CON M    90  tablet    Take 1 tablet (10 mEq) by mouth daily    Hypopotassemia

## 2018-02-27 ENCOUNTER — OFFICE VISIT (OUTPATIENT)
Dept: ORTHOPEDICS | Facility: CLINIC | Age: 33
End: 2018-02-27
Payer: OTHER MISCELLANEOUS

## 2018-02-27 VITALS — BODY MASS INDEX: 38.78 KG/M2 | HEIGHT: 71 IN | RESPIRATION RATE: 13 BRPM | WEIGHT: 277 LBS

## 2018-02-27 DIAGNOSIS — M23.92 INTERNAL DERANGEMENT OF LEFT KNEE: Primary | ICD-10-CM

## 2018-02-27 PROCEDURE — 99243 OFF/OP CNSLTJ NEW/EST LOW 30: CPT | Performed by: ORTHOPAEDIC SURGERY

## 2018-02-27 NOTE — MR AVS SNAPSHOT
"              After Visit Summary   2/27/2018    Mckayla Jeff    MRN: [id number]           Patient Information     Date Of Birth          1985        Visit Information        Provider Department      2/27/2018 3:15 PM Jose Abernathy MD [facility]        Today's Diagnoses     Internal derangement of left knee    -  1       Follow-ups after your visit        Who to contact     If you have questions or need follow up information about today's clinic visit or your schedule please contact [facility] directly at [phone].  Normal or non-critical lab and imaging results will be communicated to you by VoIP Supplyhart, letter or phone within 4 business days after the clinic has received the results. If you do not hear from us within 7 days, please contact the clinic through Logentriest or phone. If you have a critical or abnormal lab result, we will notify you by phone as soon as possible.  Submit refill requests through Boomlagoon or call your pharmacy and they will forward the refill request to us. Please allow 3 business days for your refill to be completed.          Additional Information About Your Visit        MyChart Information     Boomlagoon gives you secure access to your electronic health record. If you see a primary care provider, you can also send messages to your care team and make appointments. If you have questions, please call your primary care clinic.  If you do not have a primary care provider, please call [phone] and they will assist you.        Care EveryWhere ID     This is your Care EveryWhere ID. This could be used by other organizations to access your [facility] medical records  [id number]        Your Vitals Were     Respirations Height BMI (Body Mass Index)             13 1.803 m (5' 11\") 38.63 kg/m2          Blood Pressure from Last 3 Encounters:   02/23/18 104/72   02/20/18 129/84   01/31/18 113/77    Weight from Last 3 Encounters:   02/27/18 " 125.6 kg (277 lb)   02/23/18 126.1 kg (278 lb)   02/20/18 129.3 kg (285 lb)              Today, you had the following     No orders found for display       Primary Care Provider Office Phone # Fax #    MELANIA Reyes [phone]       [facility] [address]  [place] [facility] [address]        Equal Access to Services     NOEMI BACON : Hadii aad ku hadasho Soomaali, waaxda luqadaha, qaybta kaalmada adeegyada, waxay idiin hayaan adeeg kharash lazack . So [facility] [phone].    ATENCIÓN: Si habla español, tiene a ramos disposición servicios gratuitos de asistencia lingüística. Stuart al [phone].    We comply with applicable federal civil rights laws and [place] laws. We do not discriminate on the basis of race, color, national origin, age, disability, sex, sexual orientation, or gender identity.            Thank you!     Thank you for choosing [facility]  for your care. Our goal is always to provide you with excellent care. Hearing back from our patients is one way we can continue to improve our services. Please take a few minutes to complete the written survey that you may receive in the mail after your visit with us. Thank you!             Your Updated Medication List - Protect others around you: Learn how to safely use, store and throw away your medicines at www.disposemymeds.org.          This list is accurate as of 2/27/18  5:11 PM.  Always use your most recent med list.                   Brand Name Dispense Instructions for use Diagnosis    amLODIPine 2.5 MG tablet    NORVASC    30 tablet    Take 1 tablet (2.5 mg) by mouth daily    Essential hypertension with goal blood pressure less than 140/90       losartan-hydrochlorothiazide 100-25 MG per tablet    HYZAAR    90 tablet    Take 1 tablet by mouth daily    Essential hypertension with goal blood pressure less than 140/90       metFORMIN 500 MG tablet    GLUCOPHAGE    270 tablet    Take 1 tablet (500 mg) by  mouth 3 times daily (with meals)    PCOS (polycystic ovarian syndrome)       order for DME     1 Units    Equipment being ordered: knee brace    Acute pain of left knee       potassium chloride SA 10 MEQ CR tablet    K-DUR/KLOR-CON M    90 tablet    Take 1 tablet (10 mEq) by mouth daily    Hypopotassemia

## 2018-02-27 NOTE — LETTER
2/27/2018         RE: Mckayla Jeff  [address] [facility] [place] [address]        Dear Colleague,    Thank you for referring your patient, Mckayla Jeff, to the [facility]. Please see a copy of my visit note below.    Mckayla Jeff is a 32 year old female who is seen in consultation at the request of Lyn Dickens CNP for left knee injury at work.      Past Medical History:   Diagnosis Date     Contusion of knee 12/9/2014     Hypertension      Obesity      PCOS (polycystic ovarian syndrome)        Past Surgical History:   Procedure Laterality Date     NO HISTORY OF SURGERY         Family History   Problem Relation Age of Onset     Hypertension Mother      Obesity Mother      Depression Mother      Cancer - colorectal Father      DIABETES Maternal Aunt      Depression Maternal Aunt      Asthma No family hx of      CANCER No family hx of      Blood Disease No family hx of      Neurologic Disorder No family hx of      Eye Disorder No family hx of      Thyroid Disease No family hx of      HEART DISEASE No family hx of      Lipids No family hx of        Social History     Social History     Marital status: Single     Spouse name: partner- Preet     Number of children: 0     Years of education: N/A     Occupational History     [employer]     Social History Main Topics     Smoking status: Never Smoker     Smokeless tobacco: Never Used     Alcohol use No     Drug use: No     Sexual activity: Yes     Partners: Male     Other Topics Concern     Not on file     Social History Narrative       Current Outpatient Prescriptions   Medication Sig Dispense Refill     order for DME Equipment being ordered: knee brace 1 Units 0     amLODIPine (NORVASC) 2.5 MG tablet Take 1 tablet (2.5 mg) by mouth daily (Patient not taking: Reported on 2/23/2018) 30 tablet 1     potassium chloride SA (K-DUR/KLOR-CON M) 10 MEQ CR tablet Take 1 tablet (10 mEq) by mouth  "daily 90 tablet 1     losartan-hydrochlorothiazide (HYZAAR) 100-25 MG per tablet Take 1 tablet by mouth daily 90 tablet 3     metFORMIN (GLUCOPHAGE) 500 MG tablet Take 1 tablet (500 mg) by mouth 3 times daily (with meals) (Patient not taking: Reported on 2/23/2018) 270 tablet 3       No Known Allergies    REVIEW OF SYSTEMS:  CONSTITUTIONAL:  NEGATIVE for fever, chills, change in weight, not feeling tired  SKIN:  NEGATIVE for worrisome rashes, no skin lumps, no skin ulcers and no non-healing wounds  EYES:  NEGATIVE for vision changes or irritation.  ENT/MOUTH:  NEGATIVE.  No hearing loss, no hoarseness, no difficulty swallowing.  RESP:  NEGATIVE. No cough or shortness of breath.  CV:  NEGATIVE for chest pain, palpitations or peripheral edema  GI:  NEGATIVE for nausea, abdominal pain, heartburn, or change in bowel habits  :  Negative. No dysuria, no hematuria  MUSCULOSKELETAL:  See HPI above  NEURO:  NEGATIVE . No headaches, no dizziness,  no numbness  ENDOCRINE:  NEGATIVE for temperature intolerance, skin/hair changes  HEME/ALLERGY/IMMUNE:  NEGATIVE for bleeding problems  PSYCHIATRIC:  NEGATIVE. no anxiety, no depression.      Exam:  Vitals: Resp 13  Ht 1.803 m (5' 11\")  Wt 125.6 kg (277 lb)  BMI 38.63 kg/m2  BMI= Body mass index is 38.63 kg/(m^2).  Constitutional:  healthy, alert and no distress  Neuro: Alert and Oriented x 3, Gait normal. Sensation grossly WNL.  HEENT:  Atraumatic, EOMI  Neck:  Neck supple with no tenderness.  Psych: Affect normal   Respiratory: Breathing not labored.  Cardiovascular: normal peripheral pulses  Lymph: no adenopathy  Skin: No rashes,worrisome lesions or skin problems  Spine: straight, no straight leg raising pain.  Hips show full range of motion.  There is no tenderness over the sacro-iliac joints, sciatic notch, or greater trochanters.       Again, thank you for allowing me to participate in the care of your patient.        Sincerely,        Jose Abernathy MD    "

## 2018-02-27 NOTE — PROGRESS NOTES
Mckayla Jeff is a 32 year old female who is seen in consultation at the request of Lyn Dickens CNP for left knee injury at work.      Past Medical History:   Diagnosis Date     Contusion of knee 12/9/2014     Hypertension      Obesity      PCOS (polycystic ovarian syndrome)        Past Surgical History:   Procedure Laterality Date     NO HISTORY OF SURGERY         Family History   Problem Relation Age of Onset     Hypertension Mother      Obesity Mother      Depression Mother      Cancer - colorectal Father      DIABETES Maternal Aunt      Depression Maternal Aunt      Asthma No family hx of      CANCER No family hx of      Blood Disease No family hx of      Neurologic Disorder No family hx of      Eye Disorder No family hx of      Thyroid Disease No family hx of      HEART DISEASE No family hx of      Lipids No family hx of        Social History     Social History     Marital status: Single     Spouse name: partner- Preet     Number of children: 0     Years of education: N/A     Occupational History     [employer]     Social History Main Topics     Smoking status: Never Smoker     Smokeless tobacco: Never Used     Alcohol use No     Drug use: No     Sexual activity: Yes     Partners: Male     Other Topics Concern     Not on file     Social History Narrative       Current Outpatient Prescriptions   Medication Sig Dispense Refill     order for DME Equipment being ordered: knee brace 1 Units 0     amLODIPine (NORVASC) 2.5 MG tablet Take 1 tablet (2.5 mg) by mouth daily (Patient not taking: Reported on 2/23/2018) 30 tablet 1     potassium chloride SA (K-DUR/KLOR-CON M) 10 MEQ CR tablet Take 1 tablet (10 mEq) by mouth daily 90 tablet 1     losartan-hydrochlorothiazide (HYZAAR) 100-25 MG per tablet Take 1 tablet by mouth daily 90 tablet 3     metFORMIN (GLUCOPHAGE) 500 MG tablet Take 1 tablet (500 mg) by mouth 3 times daily (with meals) (Patient not taking: Reported on  "2/23/2018) 270 tablet 3       No Known Allergies    REVIEW OF SYSTEMS:  CONSTITUTIONAL:  NEGATIVE for fever, chills, change in weight, not feeling tired  SKIN:  NEGATIVE for worrisome rashes, no skin lumps, no skin ulcers and no non-healing wounds  EYES:  NEGATIVE for vision changes or irritation.  ENT/MOUTH:  NEGATIVE.  No hearing loss, no hoarseness, no difficulty swallowing.  RESP:  NEGATIVE. No cough or shortness of breath.  CV:  NEGATIVE for chest pain, palpitations or peripheral edema  GI:  NEGATIVE for nausea, abdominal pain, heartburn, or change in bowel habits  :  Negative. No dysuria, no hematuria  MUSCULOSKELETAL:  See HPI above  NEURO:  NEGATIVE . No headaches, no dizziness,  no numbness  ENDOCRINE:  NEGATIVE for temperature intolerance, skin/hair changes  HEME/ALLERGY/IMMUNE:  NEGATIVE for bleeding problems  PSYCHIATRIC:  NEGATIVE. no anxiety, no depression.      Exam:  Vitals: Resp 13  Ht 1.803 m (5' 11\")  Wt 125.6 kg (277 lb)  BMI 38.63 kg/m2  BMI= Body mass index is 38.63 kg/(m^2).  Constitutional:  healthy, alert and no distress  Neuro: Alert and Oriented x 3, Gait normal. Sensation grossly WNL.  HEENT:  Atraumatic, EOMI  Neck:  Neck supple with no tenderness.  Psych: Affect normal   Respiratory: Breathing not labored.  Cardiovascular: normal peripheral pulses  Lymph: no adenopathy  Skin: No rashes,worrisome lesions or skin problems  Spine: straight, no straight leg raising pain.  Hips show full range of motion.  There is no tenderness over the sacro-iliac joints, sciatic notch, or greater trochanters.     "

## 2018-02-27 NOTE — LETTER
WORKABILITY    [facility], Dr. Jose Abernathy M.D.  Danae Rothman [place]        2/27/2018      RE: Mckayla Jeff    [address] [facility] [address]        To whom it may concern:     Mckayla Jeff is under my care for left lateral meniscus tear.    Work related injury: Yes Date of injury: 2/22/18.     Return to work date:  3/2/18  Work as tolerated.      [employer] (Date): unknown    Next appointment: 2 weeks          Jose Abernathy M.D.              /

## 2018-02-28 NOTE — PROGRESS NOTES
Visit Date:   02/27/2018      DATE OF VISIT: 02/27/2018      SUBJECTIVE:  Ms. Jeff is a 32-year-old female referred from [facility] for evaluation of left knee injury.  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  She did not fall, catching herself instead, but heard a pop in her left knee and has had some pain in the knee thereafter. Pain was not bad the day of the injury, but by that night and the next day was quite severe.  It is getting a little bit better now.  Initially she could not straighten the knee, now she is able to do that, but hurts with bearing weight.  She has used ice and elevation.  She has aching pain rated 3/10.  Has used a knee sleeve and crutches.  She is off crutches now.      IMAGING:  X-ray of the knee from 02/23/2018 shows mild tricompartmental osteoarthritis, just slight spurs on the patella and some calcification in the patellar tendon.  No acute injury.      PHYSICAL EXAMINATION:  Good range of motion of the knees.  She has some tenderness at the lateral joint line.  No tenderness medially. She has no pain with varus or valgus stress and has no instability of MCL, LCL, or cruciates.  She has negative medial Vidhya, but does have pain with lateral Vidhya on the left knee.  She does not have pain with resisted knee extension.  There is mild effusion of the left knee, negative on the right.  Sensation and circulation are intact.      IMPRESSION:  Left knee internal derangement.  Suspicious at this time for a lateral meniscus tear.  She is clinically improving in much of the knee, so we will give it a bit of time to see if it resolves, but I would want to see her back in 2 weeks for clinical check again and to consider MRI scan if she continues to have positive lateral Vidhya.  We will keep her off work until Friday and then let her get back to work as tolerated.  She should avoid squatting.         YEYO ORANTES MD              D: 2018   T: 2018   MT:       Name:     PATT BLOOM   MRN:      [id number]        Account:      [id number]   :      1985           Visit Date:   2018      Document: [id number]

## 2018-05-23 ENCOUNTER — OFFICE VISIT (OUTPATIENT)
Dept: FAMILY MEDICINE | Facility: CLINIC | Age: 33
End: 2018-05-23
Payer: COMMERCIAL

## 2018-05-23 VITALS
DIASTOLIC BLOOD PRESSURE: 95 MMHG | WEIGHT: 282 LBS | OXYGEN SATURATION: 98 % | BODY MASS INDEX: 39.33 KG/M2 | HEART RATE: 85 BPM | TEMPERATURE: 98.6 F | SYSTOLIC BLOOD PRESSURE: 141 MMHG

## 2018-05-23 DIAGNOSIS — E87.6 HYPOPOTASSEMIA: ICD-10-CM

## 2018-05-23 DIAGNOSIS — I10 ESSENTIAL HYPERTENSION WITH GOAL BLOOD PRESSURE LESS THAN 140/90: Primary | ICD-10-CM

## 2018-05-23 LAB
ANION GAP SERPL CALCULATED.3IONS-SCNC: 7 MMOL/L (ref 3–14)
BUN SERPL-MCNC: 17 MG/DL (ref 7–30)
CALCIUM SERPL-MCNC: 9 MG/DL (ref 8.5–10.1)
CHLORIDE SERPL-SCNC: 107 MMOL/L (ref 94–109)
CO2 SERPL-SCNC: 29 MMOL/L (ref 20–32)
CREAT SERPL-MCNC: 0.82 MG/DL (ref 0.52–1.04)
GFR SERPL CREATININE-BSD FRML MDRD: 80 ML/MIN/1.7M2
GLUCOSE SERPL-MCNC: 105 MG/DL (ref 70–99)
POTASSIUM SERPL-SCNC: 3.2 MMOL/L (ref 3.4–5.3)
SODIUM SERPL-SCNC: 143 MMOL/L (ref 133–144)

## 2018-05-23 PROCEDURE — 99213 OFFICE O/P EST LOW 20 MIN: CPT | Performed by: NURSE PRACTITIONER

## 2018-05-23 PROCEDURE — 80048 BASIC METABOLIC PNL TOTAL CA: CPT | Performed by: NURSE PRACTITIONER

## 2018-05-23 PROCEDURE — 36415 COLL VENOUS BLD VENIPUNCTURE: CPT | Performed by: NURSE PRACTITIONER

## 2018-05-23 RX ORDER — POTASSIUM CHLORIDE 750 MG/1
10 TABLET, EXTENDED RELEASE ORAL DAILY
Qty: 90 TABLET | Refills: 1 | Status: SHIPPED | OUTPATIENT
Start: 2018-05-23 | End: 2018-05-24

## 2018-05-23 RX ORDER — LOSARTAN POTASSIUM AND HYDROCHLOROTHIAZIDE 25; 100 MG/1; MG/1
1 TABLET ORAL DAILY
Qty: 90 TABLET | Refills: 3 | Status: SHIPPED | OUTPATIENT
Start: 2018-05-23 | End: 2019-06-25

## 2018-05-23 ASSESSMENT — PAIN SCALES - GENERAL: PAINLEVEL: MODERATE PAIN (4)

## 2018-05-23 NOTE — PATIENT INSTRUCTIONS
At [facility], we strive to deliver an exceptional experience to you, every time we see you.  If you receive a survey in the mail, please send us back your thoughts. We really do value your feedback.    Based on your medical history, these are the current health maintenance/preventive care services that you are due for (some may have been done at this visit.)  There are no preventive care reminders to display for this patient.      Suggested websites for health information:  Www.[place].org : Up to date and easily searchable information on multiple topics.  Www.medlineplus.gov : medication info, interactive tutorials, watch real surgeries online  Www.familydoctor.org : good info from the Academy of Family Physicians  Www.cdc.gov : public health info, travel advisories, epidemics (H1N1)  Www.aap.org : children's health info, normal development, vaccinations  Www.health.[facility].[place].us : [place] dept of health, public health issues in [place], N1N1    Your care team:                            Family Medicine Internal Medicine   MD Triston Vegas MD Shantel Branch-Fleming, MD Katya Georgiev PA-C Nam Ho, MD Pediatrics   BERT Romero, BRUNO Maciel APRMARKO CNP   MD Kristin Menendez MD Deborah Mielke, MD Kim Thein, APRN CNP      Clinic hours: Monday - Thursday 7 am-7 pm; Fridays 7 am-5 pm.   Urgent care: Monday - Friday 11 am-9 pm; Saturday and Sunday 9 am-5 pm.  Pharmacy : Monday -Thursday 8 am-8 pm; Friday 8 am-6 pm; Saturday and Sunday 9 am-5 pm.     Clinic: [phone]   Pharmacy: [phone]

## 2018-05-23 NOTE — PROGRESS NOTES
SUBJECTIVE:   Mckayla Jeff is a 32 year old female who presents to clinic today for the following health issues:      Medication Followup of All    Taking Medication as prescribed: yes    Side Effects:  None    Medication Helping Symptoms:  yes       Hypertension Follow-up      Outpatient blood pressures are being checked at home.  Results are 140/74 on average.    Low Salt Diet: not monitoring salt  Exercises cardio kickboxing.    No chest pain or shortness of breath. No numbness, tingling, weakness. Headache now. No vision change. No lower extremity edema. Intentionally lost weight and BP has been better. Never started Norvasc.   Not pregnant or breastfeeding. No plans to do so.      Problem list and histories reviewed & adjusted, as indicated.  Additional history: as documented    Patient Active Problem List   Diagnosis     Essential hypertension with goal blood pressure less than 140/90     CARDIOVASCULAR SCREENING; LDL GOAL LESS THAN 160     PCOS (polycystic ovarian syndrome)     Obesity, Class III, BMI 40-49.9 (morbid obesity) (H)     Synovitis of knee     Internal derangement of knee     Adjustment disorder with mixed anxiety and depressed mood     Health Care Home     Past Surgical History:   Procedure Laterality Date     NO HISTORY OF SURGERY         Social History   Substance Use Topics     Smoking status: Never Smoker     Smokeless tobacco: Never Used     Alcohol use No     Family History   Problem Relation Age of Onset     Hypertension Mother      Obesity Mother      Depression Mother      Cancer - colorectal Father      DIABETES Maternal Aunt      Depression Maternal Aunt      Asthma No family hx of      CANCER No family hx of      Blood Disease No family hx of      Neurologic Disorder No family hx of      Eye Disorder No family hx of      Thyroid Disease No family hx of      HEART DISEASE No family hx of      Lipids No family hx of          Current Outpatient Prescriptions   Medication Sig  Dispense Refill     losartan-hydrochlorothiazide (HYZAAR) 100-25 MG per tablet Take 1 tablet by mouth daily 90 tablet 3     metFORMIN (GLUCOPHAGE) 500 MG tablet Take 1 tablet (500 mg) by mouth 3 times daily (with meals) 270 tablet 3     order for DME Equipment being ordered: knee brace 1 Units 0     potassium chloride SA (K-DUR/KLOR-CON M) 10 MEQ CR tablet Take 1 tablet (10 mEq) by mouth daily 90 tablet 1     [DISCONTINUED] losartan-hydrochlorothiazide (HYZAAR) 100-25 MG per tablet Take 1 tablet by mouth daily 90 tablet 3     No Known Allergies  BP Readings from Last 3 Encounters:   05/23/18 (!) 141/95   02/23/18 104/72   02/20/18 129/84    Wt Readings from Last 3 Encounters:   05/23/18 282 lb (127.9 kg)   02/27/18 277 lb (125.6 kg)   02/23/18 278 lb (126.1 kg)                    Reviewed and updated as needed this visit by clinical staff  Tobacco  Allergies  Meds  Problems  Med Hx  Surg Hx  Fam Hx  Soc Hx        Reviewed and updated as needed this visit by Provider  Allergies  Meds  Problems         ROS:  Constitutional, HEENT, cardiovascular, pulmonary, gi and gu systems are negative, except as otherwise noted.    OBJECTIVE:     BP (!) 141/95  Pulse 85  Temp 98.6  F (37  C) (Oral)  Wt 282 lb (127.9 kg)  LMP  (LMP Unknown)  SpO2 98%  Breastfeeding? No  BMI 39.33 kg/m2  Body mass index is 39.33 kg/(m^2).  GENERAL: healthy, alert and no distress  RESP: lungs clear to auscultation - no rales, rhonchi or wheezes  CV: regular rate and rhythm, normal S1 S2, no S3 or S4, no murmur, click or rub, no peripheral edema and peripheral pulses strong  MS: no gross musculoskeletal defects noted, no edema  PSYCH: mentation appears normal, affect normal/bright    Diagnostic Test Results:  pending    ASSESSMENT/PLAN:     1. Essential hypertension with goal blood pressure less than 140/90  Refilling medication. Tolerating well. BP well controlled without Norvasc (except for today as she's been out of BP meds x2  days). Will hold off on starting norvasc.  - Basic metabolic panel  (Ca, Cl, CO2, Creat, Gluc, K, Na, BUN)  - losartan-hydrochlorothiazide (HYZAAR) 100-25 MG per tablet; Take 1 tablet by mouth daily  Dispense: 90 tablet; Refill: 3    2. Hypopotassemia  Continue current 10 mQq dose.  - Basic metabolic panel  (Ca, Cl, CO2, Creat, Gluc, K, Na, BUN)  - potassium chloride SA (K-DUR/KLOR-CON M) 10 MEQ CR tablet; Take 1 tablet (10 mEq) by mouth daily  Dispense: 90 tablet; Refill: 1    Explained that her medications are contraindicated in pregnancy. She does not plan pregnancy. If she becomes pregnant or decides to pursue pregnancy, she will let primary care provider know first so she can be switched to something safe in pregnancy.    See Patient Instructions    The benefits, risks and potential side effects were discussed in detail. Black box warnings discussed as relevant. All patient questions were answered. The patient was instructed to follow up immediately if any adverse reactions develop.    Patient verbalizes understanding and agrees with plan of care. Patient stable for discharge.      MELANIA Morton [facility]

## 2018-05-23 NOTE — MR AVS SNAPSHOT
After Visit Summary   5/23/2018    Mckayla Jeff    MRN: [id number]           Patient Information     Date Of Birth          1985        Visit Information        Provider Department      5/23/2018 4:40 PM Lyn Dickens APRN CNP [facility]        Today's Diagnoses     Essential hypertension with goal blood pressure less than 140/90    -  1    Hypopotassemia          Care Instructions    At [facility], we strive to deliver an exceptional experience to you, every time we see you.  If you receive a survey in the mail, please send us back your thoughts. We really do value your feedback.    Based on your medical history, these are the current health maintenance/preventive care services that you are due for (some may have been done at this visit.)  There are no preventive care reminders to display for this patient.      Suggested websites for health information:  Www.Semblee_ : Up to date and easily searchable information on multiple topics.  Www.medlineplus.gov : medication info, interactive tutorials, watch real surgeries online  Www.familydoctor.org : good info from the Academy of Family Physicians  Www.cdc.gov : public health info, travel advisories, epidemics (H1N1)  Www.aap.org : children's health info, normal development, vaccinations  Www.health.[facility].[place].us : [place] dept of health, public health issues in [place], N1N1    Your care team:                            Family Medicine Internal Medicine   MD Triston Vegas MD Shantel Branch-Fleming, MD Katya Georgiev PA-C Nam Ho, MD Pediatrics   EBRT Romero CNP Amelia Massimini APRN CNP Shaista Malik, MD Bethany Templen, MD Deborah Mielke, MD Kim Thein, APRN CNP      Clinic hours: Monday - Thursday 7 am-7 pm; Fridays 7 am-5 pm.   Urgent care: Monday - Friday 11 am-9 pm; Saturday and Sunday 9 am-5 pm.  Pharmacy : Monday -Thursday 8 am-8 pm; Friday 8 am-6  pm; Saturday and Sunday 9 am-5 pm.     Clinic: [phone]   Pharmacy: [phone]            Follow-ups after your visit        Who to contact     If you have questions or need follow up information about today's clinic visit or your schedule please contact [facility] KARINA RIZVI directly at [phone].  Normal or non-critical lab and imaging results will be communicated to you by [employer], letter or phone within 4 business days after the clinic has received the results. If you do not hear from us within 7 days, please contact the clinic through [employer]t or phone. If you have a critical or abnormal lab result, we will notify you by phone as soon as possible.  Submit refill requests through [employer] or call your pharmacy and they will forward the refill request to us. Please allow 3 business days for your refill to be completed.          Additional Information About Your Visit        [employer] Information     [employer] gives you secure access to your electronic health record. If you see a primary care provider, you can also send messages to your care team and make appointments. If you have questions, please call your primary care clinic.  If you do not have a primary care provider, please call [phone] and they will assist you.        Care EveryWhere ID     This is your Care EveryWhere ID. This could be used by other organizations to access your [place] medical records  [id number]        Your Vitals Were     Pulse Temperature Last Period Pulse Oximetry Breastfeeding? BMI (Body Mass Index)    85 98.6  F (37  C) (Oral) (LMP Unknown) 98% No 39.33 kg/m2       Blood Pressure from Last 3 Encounters:   05/23/18 (!) 141/95   02/23/18 104/72   02/20/18 129/84    Weight from Last 3 Encounters:   05/23/18 282 lb (127.9 kg)   02/27/18 277 lb (125.6 kg)   02/23/18 278 lb (126.1 kg)              We Performed the Following     Basic metabolic panel  (Ca, Cl, CO2, Creat, Gluc, K, Na, BUN)          Today's Medication  Changes          These changes are accurate as of 5/23/18  5:13 PM.  If you have any questions, ask your nurse or doctor.               Stop taking these medicines if you haven't already. Please contact your care team if you have questions.     amLODIPine 2.5 MG tablet   Commonly known as:  NORVASC   Stopped by:  Lyn Dickens APRN CNP                Where to get your medicines      These medications were sent to [facility] [address] [facility] [address] [facility]  [address] [facility] [address]     Phone:  [phone]     losartan-hydrochlorothiazide 100-25 MG per tablet    potassium chloride SA 10 MEQ CR tablet                Primary Care Provider Office Phone # Fax #    Urmila MELANIA Norton [phone] [address] [facility] [address]        Equal Access to Services     NOEMI BACON : Hadii aad ku hadasho Soomaali, waaxda luqadaha, qaybta kaalmada adeegyada, roque lacy haynyasia pickens . So [facility] [phone].    ATENCIÓN: Si habla español, tiene a ramos disposición servicios gratuitos de asistencia lingüística. Stuart al [phone].    We comply with applicable federal civil rights laws and [place] laws. We do not discriminate on the basis of race, color, national origin, age, disability, sex, sexual orientation, or gender identity.            Thank you!     Thank you for choosing [facility]  for your care. Our goal is always to provide you with excellent care. Hearing back from our patients is one way we can continue to improve our services. Please take a few minutes to complete the written survey that you may receive in the mail after your visit with us. Thank you!             Your Updated Medication List - Protect others around you: Learn how to safely use, store and throw away your medicines at www.disposemymeds.org.          This list is accurate as of 5/23/18  5:13 PM.  Always use your most recent med  list.                   Brand Name Dispense Instructions for use Diagnosis    losartan-hydrochlorothiazide 100-25 MG per tablet    HYZAAR    90 tablet    Take 1 tablet by mouth daily    Essential hypertension with goal blood pressure less than 140/90       metFORMIN 500 MG tablet    GLUCOPHAGE    270 tablet    Take 1 tablet (500 mg) by mouth 3 times daily (with meals)    PCOS (polycystic ovarian syndrome)       order for DME     1 Units    Equipment being ordered: knee brace    Acute pain of left knee       potassium chloride SA 10 MEQ CR tablet    K-DUR/KLOR-CON M    90 tablet    Take 1 tablet (10 mEq) by mouth daily    Hypopotassemia

## 2018-05-24 DIAGNOSIS — E87.6 HYPOPOTASSEMIA: ICD-10-CM

## 2018-05-24 RX ORDER — POTASSIUM CHLORIDE 750 MG/1
20 TABLET, EXTENDED RELEASE ORAL DAILY
Qty: 90 TABLET | Refills: 0
Start: 2018-05-24 | End: 2019-06-25

## 2018-05-24 NOTE — PROGRESS NOTES
Please call patient. I sent the following [employer] message this morning. Please be sure she got it and has no questions. Thanks!    Your potassium continues to be low. Please take 2 tabs of potassium daily with your blood pressure pill. You will take 20mEq daily. Let's recheck your potassium in 1 week - I have placed order for potassium level. Please make lab only appointment to recheck. A nurse from our clinic will also be calling to be sure you got this message. Please let me know if you have any questions.  Thanks,  MELANIA Morton CNP

## 2018-05-29 ENCOUNTER — DOCUMENTATION ONLY (OUTPATIENT)
Dept: LAB | Facility: CLINIC | Age: 33
End: 2018-05-29

## 2018-05-29 DIAGNOSIS — E87.6 HYPOKALEMIA: Primary | ICD-10-CM

## 2018-05-29 NOTE — PROGRESS NOTES
Patient has a lab appointment on 5/31/2018. Per the appointment notes they are coming in for labs from you.  Please place orders or let patient know appointment is not needed.     Thank you,  Estrella Lopez

## 2018-06-11 ENCOUNTER — OFFICE VISIT (OUTPATIENT)
Dept: URGENT CARE | Facility: URGENT CARE | Age: 33
End: 2018-06-11
Payer: COMMERCIAL

## 2018-06-11 VITALS
HEART RATE: 87 BPM | TEMPERATURE: 98.4 F | BODY MASS INDEX: 39.29 KG/M2 | DIASTOLIC BLOOD PRESSURE: 85 MMHG | WEIGHT: 281.7 LBS | SYSTOLIC BLOOD PRESSURE: 129 MMHG | OXYGEN SATURATION: 98 %

## 2018-06-11 DIAGNOSIS — H10.023 PINK EYE DISEASE OF BOTH EYES: Primary | ICD-10-CM

## 2018-06-11 PROCEDURE — 99213 OFFICE O/P EST LOW 20 MIN: CPT | Performed by: PHYSICIAN ASSISTANT

## 2018-06-11 RX ORDER — POLYMYXIN B SULFATE AND TRIMETHOPRIM 1; 10000 MG/ML; [USP'U]/ML
1 SOLUTION OPHTHALMIC
Qty: 1 BOTTLE | Refills: 0 | Status: SHIPPED | OUTPATIENT
Start: 2018-06-11 | End: 2018-06-18

## 2018-06-11 ASSESSMENT — ENCOUNTER SYMPTOMS
EYE DISCHARGE: 1
PALPITATIONS: 0
WEAKNESS: 0
FATIGUE: 0
CARDIOVASCULAR NEGATIVE: 1
RHINORRHEA: 0
NAUSEA: 0
FREQUENCY: 0
LIGHT-HEADEDNESS: 0
FEVER: 0
SORE THROAT: 0
PHOTOPHOBIA: 0
DIZZINESS: 0
NECK STIFFNESS: 0
ADENOPATHY: 0
RESPIRATORY NEGATIVE: 1
SHORTNESS OF BREATH: 0
TROUBLE SWALLOWING: 0
EYE ITCHING: 1
EYE REDNESS: 1
POLYDIPSIA: 0
HEADACHES: 0
ABDOMINAL PAIN: 0
CHILLS: 0
COUGH: 0
FLANK PAIN: 0
HEMATURIA: 0
MYALGIAS: 0
VOMITING: 0
EYE PAIN: 0
CONSTIPATION: 0
NECK PAIN: 0
DIARRHEA: 0

## 2018-06-11 NOTE — LETTER
[facility]  [address]  [place] [address]          June 11, 2018    RE:  Mckayla Washington                                                                                                                                                       [address] [facility] [place] [address]            To whom it may concern:    Mckayla Jeff is under my professional care for Pink eye disease of both eyes She  may return to work on Wednesday 6/13/2018    Sincerely,        Moises Pereyra PA-C

## 2018-06-11 NOTE — PROGRESS NOTES
Chief Complaint:      Chief Complaint   Patient presents with     Eye Problem     Patient complains of redness and pain in right eye        HPI: Mckayla Jeff is a 32 year old female presenting with both eyes discharge, mattering, redness, itching  for the past 1 days.  There has been exposure to pink eye at home.  There has not been trauma to the eye.    Mckayla Jeff does not wear contact lenses.    No problems with vision, or eye pain.       Other associated symptoms: None    No fever, abdominal pain, diarrhea or vomiting.  No cough, chest pain or shortness of breath.    ROS:     Review of Systems   Constitutional: Negative for chills, fatigue and fever.   HENT: Negative for congestion, ear pain, rhinorrhea, sore throat and trouble swallowing.    Eyes: Positive for discharge, redness and itching. Negative for photophobia, pain and visual disturbance.   Respiratory: Negative.  Negative for cough and shortness of breath.    Cardiovascular: Negative.  Negative for chest pain and palpitations.   Gastrointestinal: Negative for abdominal pain, constipation, diarrhea, nausea and vomiting.   Endocrine: Negative for polydipsia and polyuria.   Genitourinary: Negative for flank pain, frequency, hematuria and urgency.   Musculoskeletal: Negative for myalgias, neck pain and neck stiffness.   Skin: Negative for rash.   Allergic/Immunologic: Negative for immunocompromised state.   Neurological: Negative for dizziness, weakness, light-headedness and headaches.   Hematological: Negative for adenopathy.        Vision History        Respiratory History  no history of pneumonia or bronchitis     Family History   Family History   Problem Relation Age of Onset     Hypertension Mother      Obesity Mother      Depression Mother      Cancer - colorectal Father      DIABETES Maternal Aunt      Depression Maternal Aunt      Asthma No family hx of      CANCER No family hx of      Blood Disease No family hx of      Neurologic  Disorder No family hx of      Eye Disorder No family hx of      Thyroid Disease No family hx of      HEART DISEASE No family hx of      Lipids No family hx of         Problem history  Patient Active Problem List   Diagnosis     Essential hypertension with goal blood pressure less than 140/90     CARDIOVASCULAR SCREENING; LDL GOAL LESS THAN 160     PCOS (polycystic ovarian syndrome)     Obesity, Class III, BMI 40-49.9 (morbid obesity) (H)     Synovitis of knee     Internal derangement of knee     Adjustment disorder with mixed anxiety and depressed mood     Health Care Home        Allergies  No Known Allergies     Social History  Social History     Social History     Marital status: Single     Spouse name: partner- Preet     Number of children: 0     Years of education: N/A     Occupational History     [employer]     Social History Main Topics     Smoking status: Never Smoker     Smokeless tobacco: Never Used     Alcohol use No     Drug use: No     Sexual activity: Yes     Partners: Male     Other Topics Concern     Not on file     Social History Narrative        Current Meds    Current Outpatient Prescriptions:      losartan-hydrochlorothiazide (HYZAAR) 100-25 MG per tablet, Take 1 tablet by mouth daily, Disp: 90 tablet, Rfl: 3     metFORMIN (GLUCOPHAGE) 500 MG tablet, Take 1 tablet (500 mg) by mouth 3 times daily (with meals), Disp: 270 tablet, Rfl: 3     order for DME, Equipment being ordered: knee brace, Disp: 1 Units, Rfl: 0     potassium chloride SA (K-DUR/KLOR-CON M) 10 MEQ CR tablet, Take 2 tablets (20 mEq) by mouth daily, Disp: 90 tablet, Rfl: 0          OBJECTIVE     Vital signs reviewed by Moises Pereyra  /85 (BP Location: Left arm, Patient Position: Chair, Cuff Size: Adult Regular)  Pulse 87  Temp 98.4  F (36.9  C) (Oral)  Wt 281 lb 11.2 oz (127.8 kg)  LMP  (LMP Unknown)  SpO2 98%  BMI 39.29 kg/m2     PEFR:  General appearance: healthy, alert and no  distress  Ears: R TM - normal: no effusions, no erythema, and normal landmarks, L TM - normal: no effusions, no erythema, and normal landmarks  Eyes: R conjunctival erythema, EOM's intact and PERRLA, L conjunctival erythema, EOM's intact and PERRLA  Nose: normal  Oropharynx: normal  Neck: supple and no adenopathy  Lungs: normal and clear to auscultation  Heart: S1, S2 normal, no murmur, click, rub or gallop, regular rate and rhythm       ASSESSMENT     1. Pink eye disease of both eyes         PLAN  Rx for Polytrim drops  Artifical tears for irritation  Warm compresses with baby shampoo for mattering.   If symptoms are not improving follow up with your eye doctor.  If contact wearer, do not wear contacts for next 7 days.  Throw old contacts away.  Worrisome symptoms discussed with instructions to go to the ED.  Patient verbalized understanding and agreed with this plan.     Moises Pereyra  6/11/2018, 4:00 PM

## 2018-06-11 NOTE — MR AVS SNAPSHOT
After Visit Summary   6/11/2018    Mckayla Jeff    MRN: [id number]           Patient Information     Date Of Birth          1985        Visit Information        Provider Department      6/11/2018 3:25 PM Moises Pereyra PA-C [facility]        Today's Diagnoses     Pink eye disease of both eyes    -  1       Follow-ups after your visit        Who to contact     If you have questions or need follow up information about today's clinic visit or your schedule please contact [facility] directly at [phone].  Normal or non-critical lab and imaging results will be communicated to you by [employer], letter or phone within 4 business days after the clinic has received the results. If you do not hear from us within 7 days, please contact the clinic through [employer] or phone. If you have a critical or abnormal lab result, we will notify you by phone as soon as possible.  Submit refill requests through [employer] or call your pharmacy and they will forward the refill request to us. Please allow 3 business days for your refill to be completed.          Additional Information About Your Visit        MyChart Information     [employer] gives you secure access to your electronic health record. If you see a primary care provider, you can also send messages to your care team and make appointments. If you have questions, please call your primary care clinic.  If you do not have a primary care provider, please call [phone] and they will assist you.        Care EveryWhere ID     This is your Care EveryWhere ID. This could be used by other organizations to access your [place] medical records  [id number]        Your Vitals Were     Pulse Temperature Last Period Pulse Oximetry BMI (Body Mass Index)       87 98.4  F (36.9  C) (Oral) (LMP Unknown) 98% 39.29 kg/m2        Blood Pressure from Last 3 Encounters:   06/11/18 129/85   05/23/18 (!) 141/95   02/23/18 104/72     Weight from Last 3 Encounters:   06/11/18 281 lb 11.2 oz (127.8 kg)   05/23/18 282 lb (127.9 kg)   02/27/18 277 lb (125.6 kg)              Today, you had the following     No orders found for display         Today's Medication Changes          These changes are accurate as of 6/11/18  6:00 PM.  If you have any questions, ask your nurse or doctor.               Start taking these medicines.        Dose/Directions    trimethoprim-polymyxin b ophthalmic solution   Commonly known as:  POLYTRIM   Used for:  Pink eye disease of both eyes   Started by:  Moises Pereyra PA-C        Dose:  1 drop   Apply 1 drop to eye every 3 hours for 7 days   Quantity:  1 Bottle   Refills:  0            Where to get your medicines      These medications were sent to [facility] [address] [place] [address]  [address] [facility] [address]     Phone:  [phone]     trimethoprim-polymyxin b ophthalmic solution                Primary Care Provider Office Phone # Fax #    MELANIA Reyes [phone]       [address] [facility] [address]        Equal Access to Services     ADRIANA BACON AH: Hadii aad ku hadasho Soomaali, waaxda luqadaha, qaybta kaalmada adeegyada, roque pickens . So [facility] [phone].    ATENCIÓN: Si habla español, tiene a ramos disposición servicios gratuitos de asistencia lingüística. Llame al [phone].    We comply with applicable federal civil rights laws and [place] laws. We do not discriminate on the basis of race, color, national origin, age, disability, sex, sexual orientation, or gender identity.            Thank you!     Thank you for choosing [facility]  for your care. Our goal is always to provide you with excellent care. Hearing back from our patients is one way we can continue to improve our services. Please take a few minutes to complete the written survey that you may receive in the  mail after your visit with us. Thank you!             Your Updated Medication List - Protect others around you: Learn how to safely use, store and throw away your medicines at www.disposemymeds.org.          This list is accurate as of 6/11/18  6:00 PM.  Always use your most recent med list.                   Brand Name Dispense Instructions for use Diagnosis    losartan-hydrochlorothiazide 100-25 MG per tablet    HYZAAR    90 tablet    Take 1 tablet by mouth daily    Essential hypertension with goal blood pressure less than 140/90       metFORMIN 500 MG tablet    GLUCOPHAGE    270 tablet    Take 1 tablet (500 mg) by mouth 3 times daily (with meals)    PCOS (polycystic ovarian syndrome)       order for DME     1 Units    Equipment being ordered: knee brace    Acute pain of left knee       potassium chloride SA 10 MEQ CR tablet    K-DUR/KLOR-CON M    90 tablet    Take 2 tablets (20 mEq) by mouth daily    Hypopotassemia       trimethoprim-polymyxin b ophthalmic solution    POLYTRIM    1 Bottle    Apply 1 drop to eye every 3 hours for 7 days    Pink eye disease of both eyes

## 2018-07-15 ENCOUNTER — OFFICE VISIT (OUTPATIENT)
Dept: URGENT CARE | Facility: URGENT CARE | Age: 33
End: 2018-07-15
Payer: COMMERCIAL

## 2018-07-15 VITALS
HEART RATE: 91 BPM | WEIGHT: 290.4 LBS | SYSTOLIC BLOOD PRESSURE: 146 MMHG | BODY MASS INDEX: 40.5 KG/M2 | DIASTOLIC BLOOD PRESSURE: 93 MMHG | OXYGEN SATURATION: 99 % | TEMPERATURE: 98.8 F

## 2018-07-15 DIAGNOSIS — R39.89 URINARY PROBLEM: Primary | ICD-10-CM

## 2018-07-15 DIAGNOSIS — R20.8 SUPRAPUBIC ABDOMINAL BURNING SENSATION: ICD-10-CM

## 2018-07-15 DIAGNOSIS — R11.0 NAUSEA: ICD-10-CM

## 2018-07-15 LAB
ALBUMIN UR-MCNC: NEGATIVE MG/DL
APPEARANCE UR: ABNORMAL
BACTERIA #/AREA URNS HPF: ABNORMAL /HPF
BETA HCG QUAL IFA URINE: NEGATIVE
BILIRUB UR QL STRIP: NEGATIVE
COLOR UR AUTO: YELLOW
GLUCOSE UR STRIP-MCNC: NEGATIVE MG/DL
HGB UR QL STRIP: NEGATIVE
KETONES UR STRIP-MCNC: ABNORMAL MG/DL
LEUKOCYTE ESTERASE UR QL STRIP: NEGATIVE
NITRATE UR QL: NEGATIVE
NON-SQ EPI CELLS #/AREA URNS LPF: ABNORMAL /LPF
PH UR STRIP: 5.5 PH (ref 5–7)
RBC #/AREA URNS AUTO: ABNORMAL /HPF
SOURCE: ABNORMAL
SP GR UR STRIP: >1.03 (ref 1–1.03)
SPECIMEN SOURCE: NORMAL
UROBILINOGEN UR STRIP-ACNC: 0.2 EU/DL (ref 0.2–1)
WBC #/AREA URNS AUTO: ABNORMAL /HPF
WET PREP SPEC: NORMAL

## 2018-07-15 PROCEDURE — 87086 URINE CULTURE/COLONY COUNT: CPT | Performed by: FAMILY MEDICINE

## 2018-07-15 PROCEDURE — 81001 URINALYSIS AUTO W/SCOPE: CPT | Performed by: FAMILY MEDICINE

## 2018-07-15 PROCEDURE — 84703 CHORIONIC GONADOTROPIN ASSAY: CPT | Performed by: FAMILY MEDICINE

## 2018-07-15 PROCEDURE — 87210 SMEAR WET MOUNT SALINE/INK: CPT | Performed by: FAMILY MEDICINE

## 2018-07-15 PROCEDURE — 99214 OFFICE O/P EST MOD 30 MIN: CPT | Performed by: FAMILY MEDICINE

## 2018-07-15 RX ORDER — ONDANSETRON 4 MG/1
4-8 TABLET, ORALLY DISINTEGRATING ORAL EVERY 8 HOURS PRN
Qty: 20 TABLET | Refills: 1 | Status: SHIPPED | OUTPATIENT
Start: 2018-07-15 | End: 2018-11-08

## 2018-07-15 NOTE — PROGRESS NOTES
SUBJECTIVE:  Chief Complaint   Patient presents with     Urinary Problem     Patient complains of nausea, low back pain, and lower abdominal pain      HPI:Mckayla Jeff is a 32 year old female   who presents with the CC of lower abdominal/pelvic burning with nausea    Pain is located in the suprapubic area, with radiation to None.  The pain is characterized as aching,   Pain has been present for 1 day(s) and is slowly progressive.  EXACERBATING FACTORS: nothing  RELIEVING FACTORS: nothing.  ASSOCIATED SX: nausea.  Patient has worsening pain with sexual relations? no     Surgical history:  No history of :  appendectomy, cholecystectomy, KAROL with BSO and vaginal hysterectomy  Colon / intestinal surgery  none  Last time she passed stool today-  No constipation,no diarrhea  Last time she urinated today, no dysuria    Past Medical History:   Diagnosis Date     Contusion of knee 12/9/2014     Hypertension      Obesity      PCOS (polycystic ovarian syndrome)      Patient Active Problem List   Diagnosis     Essential hypertension with goal blood pressure less than 140/90     CARDIOVASCULAR SCREENING; LDL GOAL LESS THAN 160     PCOS (polycystic ovarian syndrome)     Obesity, Class III, BMI 40-49.9 (morbid obesity) (H)     Synovitis of knee     Internal derangement of knee     Adjustment disorder with mixed anxiety and depressed mood     Health Care Home       ALLERGIES:  Review of patient's allergies indicates no known allergies.      Current Outpatient Prescriptions on File Prior to Visit:  losartan-hydrochlorothiazide (HYZAAR) 100-25 MG per tablet Take 1 tablet by mouth daily   metFORMIN (GLUCOPHAGE) 500 MG tablet Take 1 tablet (500 mg) by mouth 3 times daily (with meals)   order for DME Equipment being ordered: knee brace   potassium chloride SA (K-DUR/KLOR-CON M) 10 MEQ CR tablet Take 2 tablets (20 mEq) by mouth daily     No current facility-administered medications on file prior to visit.     Social History    Substance Use Topics     Smoking status: Never Smoker     Smokeless tobacco: Never Used     Alcohol use No       Family History   Problem Relation Age of Onset     Hypertension Mother      Obesity Mother      Depression Mother      Cancer - colorectal Father      Diabetes Maternal Aunt      Depression Maternal Aunt      Asthma No family hx of      Cancer No family hx of      Blood Disease No family hx of      Neurologic Disorder No family hx of      Eye Disorder No family hx of      Thyroid Disease No family hx of      HEART DISEASE No family hx of      Lipids No family hx of          ROS:  CONSTITUTIONAL:NEGATIVE for fever, chills,    INTEGUMENTARY/SKIN: NEGATIVE for worrisome rashes,   EYES: NEGATIVE for vision changes or irritation  ENT/MOUTH: NEGATIVE for ear, mouth and throat problems  RESP:NEGATIVE for significant cough or SOB  GI: NEGATIVE for  , abdominal pain,  change in bowel habits    OBJECTIVE:  BP (!) 146/93 (BP Location: Left arm, Patient Position: Chair, Cuff Size: Adult Regular)  Pulse 91  Temp 98.8  F (37.1  C) (Oral)  Wt 290 lb 6.4 oz (131.7 kg)  SpO2 99%  BMI 40.5 kg/m2  GENERAL APPEARANCE: alert, moderate distress and cooperative  EYES: EOMI,  PERRL, conjunctiva clear  HENT: ear canals and TM's normal.  Nose and mouth without ulcers, erythema or lesions  NECK: supple, nontender, no lymphadenopathy  RESP: lungs clear to auscultation - no rales, rhonchi or wheezes  CV: regular rates and rhythm, normal S1 S2, no murmur noted  ABDOMEN:  soft, nontender, no HSM or masses and bowel sounds normal  GU_female: external genitalia normal, vagina normal without discharge, urethra without abnormality or discharge, cervix normal in appearance with no cervical motion tenderness, uterus normal size, shape, and consistency, no adenexal tenderness or masses noted  NEURO: Normal strength and tone, sensory exam grossly normal,  normal speech and mentation  SKIN: no suspicious lesions or rashes     Results for  orders placed or performed in visit on 07/15/18   UA with Microscopic reflex to Culture   Result Value Ref Range    Color Urine Yellow     Appearance Urine Slightly Cloudy     Glucose Urine Negative NEG^Negative mg/dL    Bilirubin Urine Negative NEG^Negative    Ketones Urine Trace (A) NEG^Negative mg/dL    Specific Gravity Urine >1.030 1.003 - 1.035    pH Urine 5.5 5.0 - 7.0 pH    Protein Albumin Urine Negative NEG^Negative mg/dL    Urobilinogen Urine 0.2 0.2 - 1.0 EU/dL    Nitrite Urine Negative NEG^Negative    Blood Urine Negative NEG^Negative    Leukocyte Esterase Urine Negative NEG^Negative    Source Midstream Urine     WBC Urine 0 - 5 OTO5^0 - 5 /HPF    RBC Urine O - 2 OTO2^O - 2 /HPF    Squamous Epithelial /LPF Urine Moderate (A) FEW^Few /LPF    Bacteria Urine Moderate (A) NEG^Negative /HPF   Beta HCG qual IFA urine - [facility] and [place]   Result Value Ref Range    Beta HCG Qual IFA Urine Negative NEG^Negative      Wet prep   Result Value Ref Range    Specimen Description Vagina     Wet Prep No Trichomonas seen     Wet Prep No clue cells seen     Wet Prep No yeast seen          ASSESSMENT:  1. Urinary problem     - UA with Microscopic reflex to Culture  - Wet prep  - Urine Culture Aerobic Bacterial    2. Nausea     - Beta HCG qual IFA urine - [facility] and [place]  - ondansetron (ZOFRAN ODT) 4 MG ODT tab; Take 1-2 tablets (4-8 mg) by mouth every 8 hours as needed for nausea  Dispense: 20 tablet; Refill: 1    3. Suprapubic abdominal burning sensation    Discussed that with her negative labs that her nausea and discomfort is most likely associated with a gastroenteritis.-  She will monitor symptoms    Discussed that exam does not fit  UTI, vaginal infection, no symptomatic PID, no ovarian pain, no pain of left colon, no pain appendix region

## 2018-07-15 NOTE — PATIENT INSTRUCTIONS
"   * FOOD POISONING or VIRAL GASTROENTERITIS (6yr-Adult)  FOOD POISONING may occur from 6 to 24 hours after eating food that has spoiled and lasts up to1-2 days. VIRAL GASTRO-ENTERITIS is commonly known as the \"stomach flu\" and may last 2-7 days. Symptoms of both illnesses may include vomiting, diarrhea, fever, abdominal cramping. Antibiotics are not effective, but simple home treatment will be helpful.  HOME CARE:      If symptoms are severe, rest at home for the next 24 hours.    Avoid tobacco and alcohol. These may worsen your symptoms.    If medicines for diarrhea (low dose of Immodium: one tablet a day for an adult) or vomiting were prescribed, take only as directed.   During the first 12 to 24 hours follow the diet below:    DRINKS: Sport drinks like Gatorade, soft drinks without caffeine; ginger ale, mineral water (plain or flavored), decaffeinated tea and coffee.    SOUPS: Clear broth, consommé and bouillon    DESSERTS: Plain gelatin (Jell-O), popsicles and fruit juice bars.  During the next 24 hours you may add the following to the above:    Hot cereal, plain toast, bread, rolls, crackers    Plain noodles, rice, mashed potatoes, chicken noodle or rice soup    Unsweetened canned fruit (avoid pineapple), bananas    Limit fat intake to less than 15 grams per day by avoiding margarine, butter, oils, mayonnaise, sauces, gravies, fried foods, peanut butter, meat, poultry and fish.    Limit fiber; avoid raw or cooked vegetables, fresh fruits (except bananas) and bran cereals.    Limit caffeine and chocolate. No spices or seasonings except salt.  Slowly go back to a normal diet as you feel better and your symptoms lessen.  FOLLOW UP with your doctor as advised if you are not better in 2 days. If a stool (diarrhea) sample was taken, you may call in 2 days (or as directed) for the results.  GET PROMPT MEDICAL ATTENTION if any of the following occur:    Increasing abdominal pain or constant pain in one " spot    Continued vomiting (unable to keep liquids down)    Frequent diarrhea (more than 5 times a day)    Blood in vomit or stool (black or red color)    Unable to take in fluids at all    No urine output for 12 hours or extreme thirst    Weakness, dizziness, fainting    Drowsiness, confusion, stiff neck or seizure    Fever over 101.0  F (38.3  C) for more than 3 days    New rash    6624-7198 [employer]. [address] [place] [address], [place] [address]. All rights reserved. This information is not intended as a substitute for professional medical care. Always follow your healthcare professional's instructions.  This information has been modified by your health care provider with permission from the publisher.

## 2018-07-15 NOTE — MR AVS SNAPSHOT
"              After Visit Summary   7/15/2018    Mckayla Jeff    MRN: [id number]           Patient Information     Date Of Birth          1985        Visit Information        Provider Department      7/15/2018 2:25 PM Josy Plunkett MD [facility]        Today's Diagnoses     Urinary problem    -  1    Nausea          Care Instructions       * FOOD POISONING or VIRAL GASTROENTERITIS (6yr-Adult)  FOOD POISONING may occur from 6 to 24 hours after eating food that has spoiled and lasts up to1-2 days. VIRAL GASTRO-ENTERITIS is commonly known as the \"stomach flu\" and may last 2-7 days. Symptoms of both illnesses may include vomiting, diarrhea, fever, abdominal cramping. Antibiotics are not effective, but simple home treatment will be helpful.  HOME CARE:      If symptoms are severe, rest at home for the next 24 hours.    Avoid tobacco and alcohol. These may worsen your symptoms.    If medicines for diarrhea (low dose of Immodium: one tablet a day for an adult) or vomiting were prescribed, take only as directed.   During the first 12 to 24 hours follow the diet below:    DRINKS: Sport drinks like Gatorade, soft drinks without caffeine; ginger ale, mineral water (plain or flavored), decaffeinated tea and coffee.    SOUPS: Clear broth, consommé and bouillon    DESSERTS: Plain gelatin (Jell-O), popsicles and fruit juice bars.  During the next 24 hours you may add the following to the above:    Hot cereal, plain toast, bread, rolls, crackers    Plain noodles, rice, mashed potatoes, chicken noodle or rice soup    Unsweetened canned fruit (avoid pineapple), bananas    Limit fat intake to less than 15 grams per day by avoiding margarine, butter, oils, mayonnaise, sauces, gravies, fried foods, peanut butter, meat, poultry and fish.    Limit fiber; avoid raw or cooked vegetables, fresh fruits (except bananas) and bran cereals.    Limit caffeine and chocolate. No spices or seasonings except " salt.  Slowly go back to a normal diet as you feel better and your symptoms lessen.  FOLLOW UP with your doctor as advised if you are not better in 2 days. If a stool (diarrhea) sample was taken, you may call in 2 days (or as directed) for the results.  GET PROMPT MEDICAL ATTENTION if any of the following occur:    Increasing abdominal pain or constant pain in one spot    Continued vomiting (unable to keep liquids down)    Frequent diarrhea (more than 5 times a day)    Blood in vomit or stool (black or red color)    Unable to take in fluids at all    No urine output for 12 hours or extreme thirst    Weakness, dizziness, fainting    Drowsiness, confusion, stiff neck or seizure    Fever over 101.0  F (38.3  C) for more than 3 days    New rash    0266-4245 [employer]. [address] [place] [address]. All rights reserved. This information is not intended as a substitute for professional medical care. Always follow your healthcare professional's instructions.  This information has been modified by your health care provider with permission from the publisher.            Follow-ups after your visit        Who to contact     If you have questions or need follow up information about today's clinic visit or your schedule please contact [facility] directly at [phone].  Normal or non-critical lab and imaging results will be communicated to you by Gasngohart, letter or phone within 4 business days after the clinic has received the results. If you do not hear from us within 7 days, please contact the clinic through Gasngohart or phone. If you have a critical or abnormal lab result, we will notify you by phone as soon as possible.  Submit refill requests through [employer] or call your pharmacy and they will forward the refill request to us. Please allow 3 business days for your refill to be completed.          Additional Information About Your Visit        MyChart Information     [employer]  gives you secure access to your electronic health record. If you see a primary care provider, you can also send messages to your care team and make appointments. If you have questions, please call your primary care clinic.  If you do not have a primary care provider, please call [phone] and they will assist you.        Care EveryWhere ID     This is your Care EveryWhere ID. This could be used by other organizations to access your [facility] medical records  [id number]        Your Vitals Were     Pulse Temperature Pulse Oximetry BMI (Body Mass Index)          91 98.8  F (37.1  C) (Oral) 99% 40.5 kg/m2         Blood Pressure from Last 3 Encounters:   07/15/18 (!) 146/93   06/11/18 129/85   05/23/18 (!) 141/95    Weight from Last 3 Encounters:   07/15/18 290 lb 6.4 oz (131.7 kg)   06/11/18 281 lb 11.2 oz (127.8 kg)   05/23/18 282 lb (127.9 kg)              We Performed the Following     Beta HCG qual IFA urine - [facility] and [facility]     UA with Microscopic reflex to Culture     Urine Culture Aerobic Bacterial     Wet prep          Today's Medication Changes          These changes are accurate as of 7/15/18  3:16 PM.  If you have any questions, ask your nurse or doctor.               Start taking these medicines.        Dose/Directions    ondansetron 4 MG ODT tab   Commonly known as:  ZOFRAN ODT   Used for:  Nausea   Started by:  Josy Plunkett MD        Dose:  4-8 mg   Take 1-2 tablets (4-8 mg) by mouth every 8 hours as needed for nausea   Quantity:  20 tablet   Refills:  1            Where to get your medicines      These medications were sent to [facility]  [place] - [address] [facility]  [address] [facility] [address]     Phone:  [phone]     ondansetron 4 MG ODT tab                Primary Care Provider Office Phone # Fax #    MELANIA Reyes [phone]       [address] [place] [address]        Equal Access to Services      NOEMI BACON : Hadii aad ku jonah Somichaelali, waaxda luqadaha, qaybta kaalmada adeegmiguelda, waxjose bambi haynyasia orellanaangeliaanca pickens . So [facility] [phone].    ATENCIÓN: Si habla español, tiene a ramos disposición servicios gratuitos de asistencia lingüística. Llame al [phone].    We comply with applicable federal civil rights laws and [place] laws. We do not discriminate on the basis of race, color, national origin, age, disability, sex, sexual orientation, or gender identity.            Thank you!     Thank you for choosing [facility]  for your care. Our goal is always to provide you with excellent care. Hearing back from our patients is one way we can continue to improve our services. Please take a few minutes to complete the written survey that you may receive in the mail after your visit with us. Thank you!             Your Updated Medication List - Protect others around you: Learn how to safely use, store and throw away your medicines at www.disposemymeds.org.          This list is accurate as of 7/15/18  3:16 PM.  Always use your most recent med list.                   Brand Name Dispense Instructions for use Diagnosis    losartan-hydrochlorothiazide 100-25 MG per tablet    HYZAAR    90 tablet    Take 1 tablet by mouth daily    Essential hypertension with goal blood pressure less than 140/90       metFORMIN 500 MG tablet    GLUCOPHAGE    270 tablet    Take 1 tablet (500 mg) by mouth 3 times daily (with meals)    PCOS (polycystic ovarian syndrome)       ondansetron 4 MG ODT tab    ZOFRAN ODT    20 tablet    Take 1-2 tablets (4-8 mg) by mouth every 8 hours as needed for nausea    Nausea       order for DME     1 Units    Equipment being ordered: knee brace    Acute pain of left knee       potassium chloride SA 10 MEQ CR tablet    K-DUR/KLOR-CON M    90 tablet    Take 2 tablets (20 mEq) by mouth daily    Hypopotassemia

## 2018-07-16 LAB
BACTERIA SPEC CULT: NORMAL
BACTERIA SPEC CULT: NORMAL
SPECIMEN SOURCE: NORMAL

## 2018-11-08 ENCOUNTER — OFFICE VISIT (OUTPATIENT)
Dept: FAMILY MEDICINE | Facility: CLINIC | Age: 33
End: 2018-11-08
Payer: COMMERCIAL

## 2018-11-08 VITALS
TEMPERATURE: 98.1 F | BODY MASS INDEX: 38.91 KG/M2 | HEART RATE: 83 BPM | DIASTOLIC BLOOD PRESSURE: 90 MMHG | SYSTOLIC BLOOD PRESSURE: 133 MMHG | OXYGEN SATURATION: 99 % | WEIGHT: 279 LBS

## 2018-11-08 DIAGNOSIS — R11.0 NAUSEA: ICD-10-CM

## 2018-11-08 DIAGNOSIS — Z23 NEED FOR PROPHYLACTIC VACCINATION AND INOCULATION AGAINST INFLUENZA: Primary | ICD-10-CM

## 2018-11-08 DIAGNOSIS — B96.89 BACTERIAL VAGINOSIS: ICD-10-CM

## 2018-11-08 DIAGNOSIS — N89.8 VAGINAL DISCHARGE: ICD-10-CM

## 2018-11-08 DIAGNOSIS — R53.83 FATIGUE, UNSPECIFIED TYPE: ICD-10-CM

## 2018-11-08 DIAGNOSIS — N76.0 BACTERIAL VAGINOSIS: ICD-10-CM

## 2018-11-08 DIAGNOSIS — E28.2 PCOS (POLYCYSTIC OVARIAN SYNDROME): ICD-10-CM

## 2018-11-08 LAB
ALBUMIN SERPL-MCNC: 3.4 G/DL (ref 3.4–5)
ALP SERPL-CCNC: 130 U/L (ref 40–150)
ALT SERPL W P-5'-P-CCNC: 19 U/L (ref 0–50)
ANION GAP SERPL CALCULATED.3IONS-SCNC: 7 MMOL/L (ref 3–14)
AST SERPL W P-5'-P-CCNC: 18 U/L (ref 0–45)
BASOPHILS # BLD AUTO: 0.1 10E9/L (ref 0–0.2)
BASOPHILS NFR BLD AUTO: 0.8 %
BILIRUB SERPL-MCNC: 0.3 MG/DL (ref 0.2–1.3)
BUN SERPL-MCNC: 13 MG/DL (ref 7–30)
CALCIUM SERPL-MCNC: 8.9 MG/DL (ref 8.5–10.1)
CHLORIDE SERPL-SCNC: 104 MMOL/L (ref 94–109)
CO2 SERPL-SCNC: 30 MMOL/L (ref 20–32)
CREAT SERPL-MCNC: 0.84 MG/DL (ref 0.52–1.04)
DIFFERENTIAL METHOD BLD: ABNORMAL
EOSINOPHIL # BLD AUTO: 0.3 10E9/L (ref 0–0.7)
EOSINOPHIL NFR BLD AUTO: 3.7 %
ERYTHROCYTE [DISTWIDTH] IN BLOOD BY AUTOMATED COUNT: 14.4 % (ref 10–15)
GFR SERPL CREATININE-BSD FRML MDRD: 77 ML/MIN/1.7M2
GLUCOSE SERPL-MCNC: 90 MG/DL (ref 70–99)
HBA1C MFR BLD: 5.7 % (ref 0–5.6)
HCT VFR BLD AUTO: 39.8 % (ref 35–47)
HGB BLD-MCNC: 12.5 G/DL (ref 11.7–15.7)
LYMPHOCYTES # BLD AUTO: 2.3 10E9/L (ref 0.8–5.3)
LYMPHOCYTES NFR BLD AUTO: 31.7 %
MCH RBC QN AUTO: 25.7 PG (ref 26.5–33)
MCHC RBC AUTO-ENTMCNC: 31.4 G/DL (ref 31.5–36.5)
MCV RBC AUTO: 82 FL (ref 78–100)
MONOCYTES # BLD AUTO: 0.4 10E9/L (ref 0–1.3)
MONOCYTES NFR BLD AUTO: 5.5 %
NEUTROPHILS # BLD AUTO: 4.3 10E9/L (ref 1.6–8.3)
NEUTROPHILS NFR BLD AUTO: 58.3 %
PLATELET # BLD AUTO: 360 10E9/L (ref 150–450)
POTASSIUM SERPL-SCNC: 3.3 MMOL/L (ref 3.4–5.3)
PROT SERPL-MCNC: 7.9 G/DL (ref 6.8–8.8)
RBC # BLD AUTO: 4.86 10E12/L (ref 3.8–5.2)
SODIUM SERPL-SCNC: 141 MMOL/L (ref 133–144)
SPECIMEN SOURCE: NORMAL
TSH SERPL DL<=0.005 MIU/L-ACNC: 0.83 MU/L (ref 0.4–4)
WBC # BLD AUTO: 7.4 10E9/L (ref 4–11)
WET PREP SPEC: NORMAL

## 2018-11-08 PROCEDURE — 83036 HEMOGLOBIN GLYCOSYLATED A1C: CPT | Performed by: FAMILY MEDICINE

## 2018-11-08 PROCEDURE — 99214 OFFICE O/P EST MOD 30 MIN: CPT | Mod: 25 | Performed by: FAMILY MEDICINE

## 2018-11-08 PROCEDURE — 87210 SMEAR WET MOUNT SALINE/INK: CPT | Performed by: FAMILY MEDICINE

## 2018-11-08 PROCEDURE — 90686 IIV4 VACC NO PRSV 0.5 ML IM: CPT | Performed by: FAMILY MEDICINE

## 2018-11-08 PROCEDURE — 80053 COMPREHEN METABOLIC PANEL: CPT | Performed by: FAMILY MEDICINE

## 2018-11-08 PROCEDURE — 84443 ASSAY THYROID STIM HORMONE: CPT | Performed by: FAMILY MEDICINE

## 2018-11-08 PROCEDURE — 90471 IMMUNIZATION ADMIN: CPT | Performed by: FAMILY MEDICINE

## 2018-11-08 PROCEDURE — 85025 COMPLETE CBC W/AUTO DIFF WBC: CPT | Performed by: FAMILY MEDICINE

## 2018-11-08 PROCEDURE — 36415 COLL VENOUS BLD VENIPUNCTURE: CPT | Performed by: FAMILY MEDICINE

## 2018-11-08 RX ORDER — METRONIDAZOLE 500 MG/1
500 TABLET ORAL 2 TIMES DAILY
Qty: 14 TABLET | Refills: 0 | Status: SHIPPED | OUTPATIENT
Start: 2018-11-08 | End: 2019-01-15

## 2018-11-08 RX ORDER — ONDANSETRON 4 MG/1
4-8 TABLET, ORALLY DISINTEGRATING ORAL EVERY 8 HOURS PRN
Qty: 20 TABLET | Refills: 1 | Status: SHIPPED | OUTPATIENT
Start: 2018-11-08 | End: 2022-05-02

## 2018-11-08 ASSESSMENT — PAIN SCALES - GENERAL: PAINLEVEL: NO PAIN (0)

## 2018-11-08 NOTE — PATIENT INSTRUCTIONS
Take the metronidazole for the possible bacterial vaginosis    Nausea pill as needed     Increase fluid intake    We will send you lab results

## 2018-11-08 NOTE — MR AVS SNAPSHOT
After Visit Summary   11/8/2018    Mckayla Jeff    MRN: [id number]           Patient Information     Date Of Birth          1985        Visit Information        Provider Department      11/8/2018 8:40 AM Malcolm Cook MD [facility]        Today's Diagnoses     Need for prophylactic vaccination and inoculation against influenza    -  1    Vaginal discharge        Nausea        Bacterial vaginosis        PCOS (polycystic ovarian syndrome)        Fatigue, unspecified type          Care Instructions    Take the metronidazole for the possible bacterial vaginosis    Nausea pill as needed     Increase fluid intake    We will send you lab results            Follow-ups after your visit        Who to contact     If you have questions or need follow up information about today's clinic visit or your schedule please contact [facility] directly at [phone].  Normal or non-critical lab and imaging results will be communicated to you by InsureWorxhart, letter or phone within 4 business days after the clinic has received the results. If you do not hear from us within 7 days, please contact the clinic through InsureWorxhart or phone. If you have a critical or abnormal lab result, we will notify you by phone as soon as possible.  Submit refill requests through [employer] or call your pharmacy and they will forward the refill request to us. Please allow 3 business days for your refill to be completed.          Additional Information About Your Visit        InsureWorxharOrganizer Information     [employer] gives you secure access to your electronic health record. If you see a primary care provider, you can also send messages to your care team and make appointments. If you have questions, please call your primary care clinic.  If you do not have a primary care provider, please call [phone] and they will assist you.        Care EveryWhere ID     This is your Care EveryWhere ID. This could  be used by other organizations to access your [facility] medical records  [id number]        Your Vitals Were     Pulse Temperature Pulse Oximetry Breastfeeding? BMI (Body Mass Index)       83 98.1  F (36.7  C) (Oral) 99% No 38.91 kg/m2        Blood Pressure from Last 3 Encounters:   11/08/18 133/90   07/15/18 (!) 146/93   06/11/18 129/85    Weight from Last 3 Encounters:   11/08/18 279 lb (126.6 kg)   07/15/18 290 lb 6.4 oz (131.7 kg)   06/11/18 281 lb 11.2 oz (127.8 kg)              We Performed the Following     CBC with platelets differential     Comprehensive metabolic panel     FLU VACCINE, SPLIT VIRUS, IM (QUADRIVALENT) [61711]- >3 YRS     Hemoglobin A1c     TSH with free T4 reflex     Vaccine Administration, Initial [05348]     Wet prep          Today's Medication Changes          These changes are accurate as of 11/8/18  9:06 AM.  If you have any questions, ask your nurse or doctor.               Start taking these medicines.        Dose/Directions    metroNIDAZOLE 500 MG tablet   Commonly known as:  FLAGYL   Used for:  Bacterial vaginosis   Started by:  Malcolm Cook MD        Dose:  500 mg   Take 1 tablet (500 mg) by mouth 2 times daily   Quantity:  14 tablet   Refills:  0            Where to get your medicines      These medications were sent to [facility] [address]  [address] [facility] [address]     Phone:  [phone]     metroNIDAZOLE 500 MG tablet    ondansetron 4 MG ODT tab                Primary Care Provider Office Phone # Fax #    MELANIA Reyes [phone]       [address] [place] [address]        Equal Access to Services     [facility] HospitalLAKEISHA : Hadii adriana mckenzie Sonydia, waaxda luqadaha, qaybta kaalmada dignayada, roque singh. So [facility] [phone].    ATENCIÓN: Si habla español, tiene a ramos disposición servicios gratuitos de asistencia lingüística. Llame al  [phone].    We comply with applicable federal civil rights laws and [place] laws. We do not discriminate on the basis of race, color, national origin, age, disability, sex, sexual orientation, or gender identity.            Thank you!     Thank you for choosing [facility]  for your care. Our goal is always to provide you with excellent care. Hearing back from our patients is one way we can continue to improve our services. Please take a few minutes to complete the written survey that you may receive in the mail after your visit with us. Thank you!             Your Updated Medication List - Protect others around you: Learn how to safely use, store and throw away your medicines at www.disposemymeds.org.          This list is accurate as of 11/8/18  9:06 AM.  Always use your most recent med list.                   Brand Name Dispense Instructions for use Diagnosis    losartan-hydrochlorothiazide 100-25 MG per tablet    HYZAAR    90 tablet    Take 1 tablet by mouth daily    Essential hypertension with goal blood pressure less than 140/90       metFORMIN 500 MG tablet    GLUCOPHAGE    270 tablet    Take 1 tablet (500 mg) by mouth 3 times daily (with meals)    PCOS (polycystic ovarian syndrome)       metroNIDAZOLE 500 MG tablet    FLAGYL    14 tablet    Take 1 tablet (500 mg) by mouth 2 times daily    Bacterial vaginosis       ondansetron 4 MG ODT tab    ZOFRAN ODT    20 tablet    Take 1-2 tablets (4-8 mg) by mouth every 8 hours as needed for nausea    Nausea       order for DME     1 Units    Equipment being ordered: knee brace    Acute pain of left knee       potassium chloride SA 10 MEQ CR tablet    K-DUR/KLOR-CON M    90 tablet    Take 2 tablets (20 mEq) by mouth daily    Hypopotassemia

## 2018-11-08 NOTE — PROGRESS NOTES
Injectable Influenza Immunization Documentation    1.  Is the person to be vaccinated sick today?   No    2. Does the person to be vaccinated have an allergy to a component   of the vaccine?   No  Egg Allergy Algorithm Link    3. Has the person to be vaccinated ever had a serious reaction   to influenza vaccine in the past?   No    4. Has the person to be vaccinated ever had Guillain-Barré syndrome?   No    Form completed by Emma Estes CMA           SUBJECTIVE:   Mckayla Jeff is a 33 year old female who presents to clinic today for the following health issues:       Vaginal Symptoms  Onset: several weeks     Description:  Vaginal Discharge: none   Itching (Pruritis): no   Burning sensation:  no   Odor: YES    Accompanying Signs & Symptoms:  Pain with Urination: no   Abdominal Pain: no   Fever: no     History:   Sexually active: YES  New Partner: no   Possibility of Pregnancy:  No    Precipitating factors:   Recent Antibiotic Use: no     Alleviating factors:  none    Therapies Tried and outcome: none    none    Problem list and histories reviewed & adjusted, as indicated.  Additional history: as documented         Reviewed and updated as needed this visit by clinical staff  Tobacco  Allergies  Meds  Med Hx  Surg Hx  Fam Hx  Soc Hx      Reviewed and updated as needed this visit by Provider          lots of nausea    No vomiting    Had BV in past    This feels like that     No std concern    Not using birth control    Trying to get pregnant    On period now, just started yesterday, normal    Changed soap recently    Bowels and bladder okay          Full physical not done     Mentation and affect are fine    No tremor of speech or extremity    Heart regular     Lungs clear     abd soft, nontender    Wet prep neg    ASSESSMENT / PLAN:  (Z23) Need for prophylactic vaccination and inoculation against influenza  (primary encounter diagnosis)  Comment: patient wanted this today  Plan: FLU VACCINE, SPLIT  VIRUS, IM (QUADRIVALENT)         [57784]- >3 YRS, Vaccine Administration,         Initial [80339]        Given     (N89.8) Vaginal discharge  Comment: not much discharge and neg wet prep. However, the patient has had multiple episodes of BV in past and now has the odor that if very similar to past episodes. Thus will treat  Plan: Wet prep             (R11.0) Nausea  Comment: needs refill   Plan: ondansetron (ZOFRAN ODT) 4 MG ODT tab        Use prn     (N76.0,  B96.89) Bacterial vaginosis  Comment: will cover with med   Plan: metroNIDAZOLE (FLAGYL) 500 MG tablet        Follow up if symptoms not resolving    (E28.2) PCOS (polycystic ovarian syndrome)  Comment: on metformin for pcos but check hemoglobin a1c   Plan: Hemoglobin A1c             (R53.83) Fatigue, unspecified type  Comment: check    Plan: CBC with platelets differential, Comprehensive         metabolic panel, TSH with free T4 reflex               I reviewed the patient's medications, allergies, medical history, family history, and social history.    Malcolm Cook MD

## 2019-01-08 ENCOUNTER — OFFICE VISIT (OUTPATIENT)
Dept: FAMILY MEDICINE | Facility: CLINIC | Age: 34
End: 2019-01-08
Payer: COMMERCIAL

## 2019-01-08 VITALS
HEIGHT: 71 IN | SYSTOLIC BLOOD PRESSURE: 120 MMHG | DIASTOLIC BLOOD PRESSURE: 84 MMHG | WEIGHT: 290 LBS | BODY MASS INDEX: 40.6 KG/M2 | TEMPERATURE: 98.2 F | HEART RATE: 92 BPM | OXYGEN SATURATION: 98 %

## 2019-01-08 DIAGNOSIS — R82.90 NONSPECIFIC FINDING ON EXAMINATION OF URINE: ICD-10-CM

## 2019-01-08 DIAGNOSIS — A59.01 TRICHOMONAL VAGINITIS: ICD-10-CM

## 2019-01-08 DIAGNOSIS — Z11.3 SCREEN FOR STD (SEXUALLY TRANSMITTED DISEASE): Primary | ICD-10-CM

## 2019-01-08 DIAGNOSIS — N89.8 VAGINAL ODOR: ICD-10-CM

## 2019-01-08 LAB
ALBUMIN UR-MCNC: NEGATIVE MG/DL
APPEARANCE UR: CLEAR
BACTERIA #/AREA URNS HPF: ABNORMAL /HPF
BILIRUB UR QL STRIP: NEGATIVE
COLOR UR AUTO: YELLOW
GLUCOSE UR STRIP-MCNC: NEGATIVE MG/DL
HGB UR QL STRIP: NEGATIVE
KETONES UR STRIP-MCNC: NEGATIVE MG/DL
LEUKOCYTE ESTERASE UR QL STRIP: ABNORMAL
NITRATE UR QL: NEGATIVE
NON-SQ EPI CELLS #/AREA URNS LPF: ABNORMAL /LPF
PH UR STRIP: 5.5 PH (ref 5–7)
RBC #/AREA URNS AUTO: ABNORMAL /HPF
SOURCE: ABNORMAL
SP GR UR STRIP: >1.03 (ref 1–1.03)
SPECIMEN SOURCE: ABNORMAL
UROBILINOGEN UR STRIP-ACNC: 0.2 EU/DL (ref 0.2–1)
WBC #/AREA URNS AUTO: ABNORMAL /HPF
WET PREP SPEC: ABNORMAL

## 2019-01-08 PROCEDURE — 87491 CHLMYD TRACH DNA AMP PROBE: CPT | Performed by: PHYSICIAN ASSISTANT

## 2019-01-08 PROCEDURE — 87086 URINE CULTURE/COLONY COUNT: CPT | Performed by: PHYSICIAN ASSISTANT

## 2019-01-08 PROCEDURE — 86780 TREPONEMA PALLIDUM: CPT | Performed by: PHYSICIAN ASSISTANT

## 2019-01-08 PROCEDURE — 81001 URINALYSIS AUTO W/SCOPE: CPT | Performed by: PHYSICIAN ASSISTANT

## 2019-01-08 PROCEDURE — 36415 COLL VENOUS BLD VENIPUNCTURE: CPT | Performed by: PHYSICIAN ASSISTANT

## 2019-01-08 PROCEDURE — 86803 HEPATITIS C AB TEST: CPT | Performed by: PHYSICIAN ASSISTANT

## 2019-01-08 PROCEDURE — 87210 SMEAR WET MOUNT SALINE/INK: CPT | Performed by: PHYSICIAN ASSISTANT

## 2019-01-08 PROCEDURE — 87389 HIV-1 AG W/HIV-1&-2 AB AG IA: CPT | Performed by: PHYSICIAN ASSISTANT

## 2019-01-08 PROCEDURE — 99213 OFFICE O/P EST LOW 20 MIN: CPT | Performed by: PHYSICIAN ASSISTANT

## 2019-01-08 PROCEDURE — 87591 N.GONORRHOEAE DNA AMP PROB: CPT | Performed by: PHYSICIAN ASSISTANT

## 2019-01-08 RX ORDER — METRONIDAZOLE 500 MG/1
2000 TABLET ORAL ONCE
Qty: 4 TABLET | Refills: 0 | Status: SHIPPED | OUTPATIENT
Start: 2019-01-08 | End: 2019-03-04

## 2019-01-08 ASSESSMENT — MIFFLIN-ST. JEOR: SCORE: 2111.56

## 2019-01-08 NOTE — PROGRESS NOTES
SUBJECTIVE:   Mckayla Jeff is a 33 year old female who presents to clinic today for the following health issues:      Patient is here for an STD testing.    No known exposure. She continues to have a vaginal odor.   She has been having some nausea.   She took a home pregnancy test and it was negative. Periods can be irregular for her.   The previous wet preps were all negative.       Problem list and histories reviewed & adjusted, as indicated.  Additional history: as documented    Patient Active Problem List   Diagnosis     Essential hypertension with goal blood pressure less than 140/90     CARDIOVASCULAR SCREENING; LDL GOAL LESS THAN 160     PCOS (polycystic ovarian syndrome)     Obesity, Class III, BMI 40-49.9 (morbid obesity) (H)     Synovitis of knee     Internal derangement of knee     Adjustment disorder with mixed anxiety and depressed mood     Health Care Home     Past Surgical History:   Procedure Laterality Date     NO HISTORY OF SURGERY         Social History     Tobacco Use     Smoking status: Never Smoker     Smokeless tobacco: Never Used   Substance Use Topics     Alcohol use: No     Family History   Problem Relation Age of Onset     Hypertension Mother      Obesity Mother      Depression Mother      Cancer - colorectal Father      Diabetes Maternal Aunt      Depression Maternal Aunt      Asthma No family hx of      Cancer No family hx of      Blood Disease No family hx of      Neurologic Disorder No family hx of      Eye Disorder No family hx of      Thyroid Disease No family hx of      Heart Disease No family hx of      Lipids No family hx of            Reviewed and updated as needed this visit by clinical staff  Tobacco  Allergies  Meds  Med Hx  Surg Hx  Fam Hx  Soc Hx      Reviewed and updated as needed this visit by Provider         ROS:  Constitutional, HEENT, cardiovascular, pulmonary, gi and gu systems are negative, except as otherwise noted.    OBJECTIVE:     /84 (BP  "Location: Right arm, Patient Position: Chair, Cuff Size: Adult Large)   Pulse 92   Temp 98.2  F (36.8  C) (Oral)   Ht 1.803 m (5' 11\")   Wt 131.5 kg (290 lb)   LMP 11/01/2018 (Approximate)   SpO2 98%   BMI 40.45 kg/m    Body mass index is 40.45 kg/m .  GENERAL: healthy, alert and no distress   (female): normal female external genitalia, normal urethral meatus, vaginal mucosa, normal cervix small amount of thin white discharge    Diagnostic Test Results:  Results for orders placed or performed in visit on 01/08/19 (from the past 24 hour(s))   UA with Microscopic reflex to Culture   Result Value Ref Range    Color Urine Yellow     Appearance Urine Clear     Glucose Urine Negative NEG^Negative mg/dL    Bilirubin Urine Negative NEG^Negative    Ketones Urine Negative NEG^Negative mg/dL    Specific Gravity Urine >1.030 1.003 - 1.035    pH Urine 5.5 5.0 - 7.0 pH    Protein Albumin Urine Negative NEG^Negative mg/dL    Urobilinogen Urine 0.2 0.2 - 1.0 EU/dL    Nitrite Urine Negative NEG^Negative    Blood Urine Negative NEG^Negative    Leukocyte Esterase Urine Moderate (A) NEG^Negative    Source Midstream Urine     WBC Urine 10-25 (A) OTO5^0 - 5 /HPF    RBC Urine O - 2 OTO2^O - 2 /HPF    Squamous Epithelial /LPF Urine Few FEW^Few /LPF    Bacteria Urine Few (A) NEG^Negative /HPF   Wet prep   Result Value Ref Range    Specimen Description Vagina     Wet Prep No clue cells seen     Wet Prep No yeast seen     Wet Prep Trichomonas seen (A)        ASSESSMENT/PLAN:       ICD-10-CM    1. Screen for STD (sexually transmitted disease) Z11.3 Chlamydia trachomatis PCR     Neisseria gonorrhoeae PCR     Hepatitis C antibody     HIV Antigen Antibody Combo     Treponema Abs w Reflex to RPR and Titer   2. Vaginal odor N89.8 UA with Microscopic reflex to Culture     Wet prep   3. Nonspecific finding on examination of urine R82.90 Urine Culture Aerobic Bacterial   4. Trichomonal vaginitis A59.01 metroNIDAZOLE (FLAGYL) 500 MG tablet "   Treated for trichomonal infection. Await other STD testing.   Await urine culture.       Fide Hernández PA-C  [facility]

## 2019-01-09 LAB
BACTERIA SPEC CULT: NORMAL
C TRACH DNA SPEC QL NAA+PROBE: NEGATIVE
HCV AB SERPL QL IA: NONREACTIVE
HIV 1+2 AB+HIV1 P24 AG SERPL QL IA: NONREACTIVE
N GONORRHOEA DNA SPEC QL NAA+PROBE: NEGATIVE
SPECIMEN SOURCE: NORMAL
T PALLIDUM AB SER QL: NONREACTIVE

## 2019-01-14 NOTE — PROGRESS NOTES
SUBJECTIVE:   Mckayla Jeff is a 33 year old female who presents to clinic today for the following health issues:      Chief Complaint   Patient presents with     Follow Up     Left Ankle      Left ankle-, injured it 1 week ago (1/8/2019) by slipping on ice and twisting it (inversion).  Was seen at a workman's comp clinic called [employer].  Xray was done- no fracture.  Was placed in stirrup brace and given work restrictions.  Patient here today because she believes she can return to full duties.  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   Still has some pain on lateral side of left ankle but states pain is controlled by NSAIDs.  Gait is normal. She continues to wear brace for support.      Of note, this is the same ankle she hurt twice in past.  States always feels a bit unstable.    Problem list and histories reviewed & adjusted, as indicated.  Additional history: as documented    Patient Active Problem List   Diagnosis     Essential hypertension with goal blood pressure less than 140/90     CARDIOVASCULAR SCREENING; LDL GOAL LESS THAN 160     PCOS (polycystic ovarian syndrome)     Obesity, Class III, BMI 40-49.9 (morbid obesity) (H)     Synovitis of knee     Internal derangement of knee     Adjustment disorder with mixed anxiety and depressed mood     Health Care Home     Past Surgical History:   Procedure Laterality Date     NO HISTORY OF SURGERY         Social History     Tobacco Use     Smoking status: Never Smoker     Smokeless tobacco: Never Used   Substance Use Topics     Alcohol use: No     Family History   Problem Relation Age of Onset     Hypertension Mother      Obesity Mother      Depression Mother      Cancer - colorectal Father      Diabetes Maternal Aunt      Depression Maternal Aunt      Asthma No family hx of      Cancer No family hx of      Blood Disease No family hx of      Neurologic Disorder No family hx of      Eye Disorder No family hx of      Thyroid Disease No family hx of      Heart  "Disease No family hx of      Lipids No family hx of          Current Outpatient Medications   Medication Sig Dispense Refill     losartan-hydrochlorothiazide (HYZAAR) 100-25 MG per tablet Take 1 tablet by mouth daily 90 tablet 3     metFORMIN (GLUCOPHAGE) 500 MG tablet Take 1 tablet (500 mg) by mouth 3 times daily (with meals) 270 tablet 3     ondansetron (ZOFRAN ODT) 4 MG ODT tab Take 1-2 tablets (4-8 mg) by mouth every 8 hours as needed for nausea 20 tablet 1     potassium chloride SA (K-DUR/KLOR-CON M) 10 MEQ CR tablet Take 2 tablets (20 mEq) by mouth daily 90 tablet 0     order for DME Equipment being ordered: knee brace (Patient not taking: Reported on 1/8/2019) 1 Units 0     BP Readings from Last 3 Encounters:   01/15/19 122/82   01/08/19 120/84   11/08/18 133/90    Wt Readings from Last 3 Encounters:   01/15/19 141.1 kg (311 lb)   01/08/19 131.5 kg (290 lb)   11/08/18 126.6 kg (279 lb)                    Reviewed and updated as needed this visit by clinical staff  Tobacco  Allergies  Meds  Problems  Med Hx  Surg Hx  Fam Hx  Soc Hx        Reviewed and updated as needed this visit by Provider  Tobacco  Allergies  Meds  Med Hx  Surg Hx  Fam Hx  Soc Hx        ROS:  Constitutional, HEENT, cardiovascular, pulmonary, gi and gu systems are negative, except as otherwise noted.    OBJECTIVE:     /82   Pulse 109   Temp 97.9  F (36.6  C)   Resp 16   Ht 1.803 m (5' 11\")   Wt 141.1 kg (311 lb)   SpO2 97%   BMI 43.38 kg/m    Body mass index is 43.38 kg/m .  GENERAL: healthy, alert and no distress  MS: LLE exam shows ankle shows mild swelling distal to lateral malleolus, no tenderness to palpation.  No bruising noted.  Mild pain with dorsiflexion and eversion but ROM is full.  SKIN: no suspicious lesions or rashes  NEURO: Normal strength and tone, mentation intact and speech normal  PSYCH: mentation appears normal, affect normal/bright    Diagnostic Test Results:  none     ASSESSMENT/PLAN:     1. " Pain in joint involving ankle and foot, left  Cleared to return to work.  Discussed full recovery for ankle sprain can be 4-6 weeks. Advised continuing to wear brace for that time period.  Since this is at least the 3rd sprain on that ankle, recommending physical therapy to which patient agrees.    - [facility] PT, HAND, AND CHIROPRACTIC REFERRAL; Future    2. Obesity, Class III, BMI 40-49.9 (morbid obesity) (H)  Discussed weight loss as a way to prevent future workplace injuries      Patient Instructions     At [facility], we strive to deliver an exceptional experience to you, every time we see you.  If you receive a survey in the mail, please send us back your thoughts. We really do value your feedback.    Your care team:                            Family Medicine Internal Medicine   MD Triston Vegas MD Shantel Branch-Fleming, MD Katya Georgiev PA-C Megan Hill, APRN CNP    James Polanco, MD Pediatrics   Gregor Brown, PAJose AlfredoC  Wendy Mccullough, CNP MD Corazon Villafana APRN CNP   MD Kristin Menendez MD Deborah Mielke, MD Kim Thein, APRN [facility]      Clinic hours: Monday - Thursday 7 am-7 pm; Fridays 7 am-5 pm.   Urgent care: Monday - Friday 11 am-9 pm; Saturday and Sunday 9 am-5 pm.  Pharmacy : Monday -Thursday 8 am-8 pm; Friday 8 am-6 pm; Saturday and Sunday 9 am-5 pm.     Clinic: [phone]   Pharmacy: [phone]        Patient Education     Foot and Ankle Exercises: Single-Leg Heel Raise    This exercise is designed to stretch and strengthen your feet and ankles. Before beginning the exercise, read through all the instructions. While exercising, breathe normally and don t bounce. If you feel any pain, stop the exercise. If pain persists, inform your healthcare provider. Here are the steps to the single-leg heel raise:    Stand, using a sturdy counter for balance only. Lift 1 foot and stand with your weight on the other foot.    Rise up on your toes,  then lower back onto your heel.    Repeat 10 times. Do 3 sets a day.  Date Last Reviewed: 10/1/2017    1832-3280 [employer]. [address] [place] [address]. All rights reserved. This information is not intended as a substitute for professional medical care. Always follow your healthcare professional's instructions.           Patient Education     Bent-Knee Calf Stretch    This exercise is designed to stretch and strengthen your feet and ankles. Before beginning the exercise, read through all the instructions. While exercising, breathe normally and don t bounce. If you feel any pain, stop the exercise. If pain persists, inform your healthcare provider:    Stand an arm s length away from a wall. Place the palms of your hands on the wall. Step forward about 12 inches with your ______ foot.    Keeping toes pointed forward and both heels on the floor, bend both knees and lean forward. Hold for ______ seconds. Relax.    Repeat ______ times. Do ______ sets a day.  Date Last Reviewed: 3/1/2018    1251-1299 [employer]. [address] [place], [place] [id number]. All rights reserved. This information is not intended as a substitute for professional medical care. Always follow your healthcare professional's instructions.               MELANIA Seals [facility]

## 2019-01-15 ENCOUNTER — OFFICE VISIT (OUTPATIENT)
Dept: FAMILY MEDICINE | Facility: CLINIC | Age: 34
End: 2019-01-15
Payer: COMMERCIAL

## 2019-01-15 VITALS
BODY MASS INDEX: 41.02 KG/M2 | DIASTOLIC BLOOD PRESSURE: 82 MMHG | HEART RATE: 109 BPM | SYSTOLIC BLOOD PRESSURE: 122 MMHG | WEIGHT: 293 LBS | OXYGEN SATURATION: 97 % | RESPIRATION RATE: 16 BRPM | TEMPERATURE: 97.9 F | HEIGHT: 71 IN

## 2019-01-15 DIAGNOSIS — M25.572 PAIN IN JOINT INVOLVING ANKLE AND FOOT, LEFT: Primary | ICD-10-CM

## 2019-01-15 DIAGNOSIS — E66.01 OBESITY, CLASS III, BMI 40-49.9 (MORBID OBESITY) (H): ICD-10-CM

## 2019-01-15 PROCEDURE — 99214 OFFICE O/P EST MOD 30 MIN: CPT | Performed by: NURSE PRACTITIONER

## 2019-01-15 ASSESSMENT — MIFFLIN-ST. JEOR: SCORE: 2206.82

## 2019-01-15 NOTE — LETTER
January 15, 2019      Mckayla Jeff  [address] [address]        To Whom It May Concern:    Mckayla Jeff was seen in our clinic 1/15/2019 for ankle injury. She may return to work without restrictions 1/16/19.      Sincerely,        MELANIA Seals CNP

## 2019-01-15 NOTE — PATIENT INSTRUCTIONS
At [facility], we strive to deliver an exceptional experience to you, every time we see you.  If you receive a survey in the mail, please send us back your thoughts. We really do value your feedback.    Your care team:                            Family Medicine Internal Medicine   MD Triston Vegas MD Shantel Branch-Fleming, MD Katya Georgiev PA-C Megan Hill, APRN CNP    James Polanco, MD Pediatrics   Gregor Brown, PADEJA Mccullough, MD Corazon Mena APRN CNP   MD Kristin Menendez MD Deborah Mielke, MD Dinah Dillon, APRN [facility]      Clinic hours: Monday - Thursday 7 am-7 pm; Fridays 7 am-5 pm.   Urgent care: Monday - Friday 11 am-9 pm; Saturday and Sunday 9 am-5 pm.  Pharmacy : Monday -Thursday 8 am-8 pm; Friday 8 am-6 pm; Saturday and Sunday 9 am-5 pm.     Clinic: [phone]   Pharmacy: [phone]        Patient Education     Foot and Ankle Exercises: Single-Leg Heel Raise    This exercise is designed to stretch and strengthen your feet and ankles. Before beginning the exercise, read through all the instructions. While exercising, breathe normally and don t bounce. If you feel any pain, stop the exercise. If pain persists, inform your healthcare provider. Here are the steps to the single-leg heel raise:    Stand, using a sturdy counter for balance only. Lift 1 foot and stand with your weight on the other foot.    Rise up on your toes, then lower back onto your heel.    Repeat 10 times. Do 3 sets a day.  Date Last Reviewed: 10/1/2017    5809-9054 [employer]. [address] [place] [address], [place] [address]. All rights reserved. This information is not intended as a substitute for professional medical care. Always follow your healthcare professional's instructions.           Patient Education     Bent-Knee Calf Stretch    This exercise is designed to stretch and strengthen your feet and ankles. Before beginning the exercise, read  through all the instructions. While exercising, breathe normally and don t bounce. If you feel any pain, stop the exercise. If pain persists, inform your healthcare provider:    Stand an arm s length away from a wall. Place the palms of your hands on the wall. Step forward about 12 inches with your ______ foot.    Keeping toes pointed forward and both heels on the floor, bend both knees and lean forward. Hold for ______ seconds. Relax.    Repeat ______ times. Do ______ sets a day.  Date Last Reviewed: 3/1/2018    6633-5787 [employer]. [address] [place] [address], [place] [address]. All rights reserved. This information is not intended as a substitute for professional medical care. Always follow your healthcare professional's instructions.

## 2019-01-17 ENCOUNTER — THERAPY VISIT (OUTPATIENT)
Dept: PHYSICAL THERAPY | Facility: CLINIC | Age: 34
End: 2019-01-17
Payer: OTHER MISCELLANEOUS

## 2019-01-17 DIAGNOSIS — S93.492A SPRAIN OF ANTERIOR TALOFIBULAR LIGAMENT OF LEFT ANKLE: ICD-10-CM

## 2019-01-17 DIAGNOSIS — M25.572 PAIN IN JOINT INVOLVING ANKLE AND FOOT, LEFT: ICD-10-CM

## 2019-01-17 PROCEDURE — 97110 THERAPEUTIC EXERCISES: CPT | Mod: GP | Performed by: PHYSICAL THERAPIST

## 2019-01-17 PROCEDURE — 97161 PT EVAL LOW COMPLEX 20 MIN: CPT | Mod: GP | Performed by: PHYSICAL THERAPIST

## 2019-01-17 NOTE — PROGRESS NOTES
[facility] Initial Evaluation  Subjective:  The history is provided by the patient.   Mckayla Jeff is a 33 year old female with a left ankle condition.  Condition occurred with:  A fall/slip.  Condition occurred: at work.  This is a recurrent and new condition  1/7/2019.  Slipped on ice.  Felt a pop in ankle.  History of ankle sprains on L .    Patient reports pain:  Lateral.  Radiates to:  No radiation.  Pain is described as aching and is intermittent and reported as 3/10.  Associated symptoms:  Loss of strength. Pain is worse during the day.  Symptoms are exacerbated by walking and relieved by bracing/immobilizing and ice.  Since onset symptoms are unchanged.  Special tests:  X-ray (normal per pt).      General health as reported by patient is fair.  Pertinent medical history includes:  High blood pressure and overweight.  Medical allergies: no.  Other surgeries include:  None reported.  Current medications:  High blood pressure medication.  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  Patient is working in normal job without restrictions.  Primary job tasks include:  Driving and lifting.    Barriers include:  None as reported by patient.    Red flags:  None as reported by patient.                        Objective:  Standing Alignment:                Ankle/Foot:  Pes planus L and pes planus R    Gait:    Gait Type:  Normal               Ankle/Foot Evaluation  ROM:  AROM is normal (slight end range pains).      Strength is normal (resistance increases pain).  LIGAMENT TESTING: normal                PALPATION:     Right ankle tenderness present at:   anterior talofibular ligament and calcaneofibular ligament                                                        General     ROS    Assessment/Plan:    Patient is a 33 year old female with left side ankle complaints.    Patient has the following significant findings with corresponding treatment plan.                Diagnosis 1:  L ankle  sprain  Pain -  self management, education, directional preference exercise and home program  Decreased ROM/flexibility - manual therapy and therapeutic exercise  Decreased strength - therapeutic exercise and therapeutic activities  Decreased proprioception - neuro re-education and therapeutic activities  Decreased function - therapeutic activities    Therapy Evaluation Codes:   1) History comprised of:   Personal factors that impact the plan of care:      None.    Comorbidity factors that impact the plan of care are:      High blood pressure and Overweight.     Medications impacting care: High blood pressure.  2) Examination of Body Systems comprised of:   Body structures and functions that impact the plan of care:      Ankle.   Activity limitations that impact the plan of care are:      Walking.  3) Clinical presentation characteristics are:   Stable/Uncomplicated.  4) Decision-Making    Low complexity using standardized patient assessment instrument and/or measureable assessment of functional outcome.  Cumulative Therapy Evaluation is: Low complexity.    Previous and current functional limitations:  (See Goal Flow Sheet for this information)    Short term and Long term goals: (See Goal Flow Sheet for this information)     Communication ability:  Patient appears to be able to clearly communicate and understand verbal and written communication and follow directions correctly.  Treatment Explanation - The following has been discussed with the patient:   RX ordered/plan of care  Anticipated outcomes  Possible risks and side effects  This patient would benefit from PT intervention to resume normal activities.   Rehab potential is good.    Frequency:  2 X a month, once daily  Duration:  for 2 months  Discharge Plan:  Achieve all LTG.  Independent in home treatment program.  Reach maximal therapeutic benefit.    Please refer to the daily flowsheet for treatment today, total treatment time and time spent performing 1:1  timed codes.

## 2019-03-04 ENCOUNTER — OFFICE VISIT (OUTPATIENT)
Dept: FAMILY MEDICINE | Facility: CLINIC | Age: 34
End: 2019-03-04
Payer: COMMERCIAL

## 2019-03-04 VITALS
DIASTOLIC BLOOD PRESSURE: 81 MMHG | BODY MASS INDEX: 41.95 KG/M2 | WEIGHT: 293 LBS | OXYGEN SATURATION: 98 % | HEART RATE: 103 BPM | SYSTOLIC BLOOD PRESSURE: 132 MMHG | TEMPERATURE: 98.3 F | HEIGHT: 70 IN

## 2019-03-04 DIAGNOSIS — Z11.3 SCREEN FOR STD (SEXUALLY TRANSMITTED DISEASE): ICD-10-CM

## 2019-03-04 DIAGNOSIS — Z11.1 VISIT FOR MANTOUX TEST: ICD-10-CM

## 2019-03-04 DIAGNOSIS — Z86.19 HISTORY OF TRICHOMONAL VAGINITIS: ICD-10-CM

## 2019-03-04 DIAGNOSIS — N89.8 VAGINAL ODOR: Primary | ICD-10-CM

## 2019-03-04 LAB
SPECIMEN SOURCE: NORMAL
WET PREP SPEC: NORMAL

## 2019-03-04 PROCEDURE — 99213 OFFICE O/P EST LOW 20 MIN: CPT | Performed by: FAMILY MEDICINE

## 2019-03-04 PROCEDURE — 86580 TB INTRADERMAL TEST: CPT | Performed by: FAMILY MEDICINE

## 2019-03-04 PROCEDURE — 87210 SMEAR WET MOUNT SALINE/INK: CPT | Performed by: FAMILY MEDICINE

## 2019-03-04 ASSESSMENT — PATIENT HEALTH QUESTIONNAIRE - PHQ9: SUM OF ALL RESPONSES TO PHQ QUESTIONS 1-9: 16

## 2019-03-04 ASSESSMENT — MIFFLIN-ST. JEOR: SCORE: 2154.65

## 2019-03-04 NOTE — PROGRESS NOTES
"    SUBJECTIVE:   Mckayla Jeff is a 33 year old female who presents to clinic today for the following health issues:    Mantoux    Follow up STD check for 1/8/19    Vaginal Symptoms  Onset: x 2 mo    Description:  Vaginal Discharge: none   Itching (Pruritis): no   Burning sensation:  no   Odor: YES    Accompanying Signs & Symptoms:  Pain with Urination: no   Abdominal Pain: no   Fever: no     History:   Sexually active: no   New Partner: no   Possibility of Pregnancy:  No    Precipitating factors:   Recent Antibiotic Use: no     Alleviating factors:  Nothing    Therapies Tried and outcome: None    Patient state she thinks nothing has cleared up     She is no longer with this sexual partner     She wants to be retested     O: /81 (BP Location: Right arm, Patient Position: Sitting, Cuff Size: Adult Large)   Pulse 103   Temp 98.3  F (36.8  C) (Oral)   Ht 1.778 m (5' 10\")   Wt 137.4 kg (303 lb)   SpO2 98%   BMI 43.48 kg/m      The abdomen is soft without tenderness, guarding, mass, rebound or organomegaly. Bowel sounds are normal. No CVA tenderness or inguinal adenopathy noted.    Results for orders placed or performed in visit on 03/04/19   Wet prep   Result Value Ref Range    Specimen Description Vagina     Wet Prep No Trichomonas seen     Wet Prep No clue cells seen     Wet Prep No yeast seen          ICD-10-CM    1. Vaginal odor N89.8 Wet prep     Chlamydia trachomatis PCR     Neisseria gonorrhoeae PCR   2. History of trichomonal vaginitis Z86.19 Wet prep     CANCELED: Chlamydia trachomatis PCR     CANCELED: Neisseria gonorrhoeae PCR   3. Screen for STD (sexually transmitted disease) Z11.3 Chlamydia trachomatis PCR     Neisseria gonorrhoeae PCR     CANCELED: Chlamydia trachomatis PCR     CANCELED: Neisseria gonorrhoeae PCR   4. Visit for Mantoux test Z11.1 TB INTRADERMAL TEST     Patient did not do the urine properly   She will return for future std tests  Mantoux was placed   She will return " within 72 hours for follow up   Reviewed and updated as needed this visit by clinical staff    Current labs do not explain symptoms that patient is having        Reviewed and updated as needed this visit by Provider

## 2019-03-05 ENCOUNTER — MYC MEDICAL ADVICE (OUTPATIENT)
Dept: FAMILY MEDICINE | Facility: CLINIC | Age: 34
End: 2019-03-05

## 2019-03-05 ENCOUNTER — DOCUMENTATION ONLY (OUTPATIENT)
Dept: FAMILY MEDICINE | Facility: CLINIC | Age: 34
End: 2019-03-05

## 2019-03-05 DIAGNOSIS — Z11.3 SCREEN FOR STD (SEXUALLY TRANSMITTED DISEASE): ICD-10-CM

## 2019-03-05 DIAGNOSIS — N89.8 VAGINAL ODOR: ICD-10-CM

## 2019-03-05 ASSESSMENT — PATIENT HEALTH QUESTIONNAIRE - PHQ9: SUM OF ALL RESPONSES TO PHQ QUESTIONS 1-9: 16

## 2019-03-05 NOTE — TELEPHONE ENCOUNTER
I got a message that the urine you left was not done correctly.  The container was overfilled and they will not run the test.  They cancelled it and this will need to be repeated.    We tried to reach you by phone and your numbers do not work.

## 2019-03-07 ENCOUNTER — ALLIED HEALTH/NURSE VISIT (OUTPATIENT)
Dept: NURSING | Facility: CLINIC | Age: 34
End: 2019-03-07
Payer: COMMERCIAL

## 2019-03-07 DIAGNOSIS — Z11.1 VISIT FOR MANTOUX TEST: Primary | ICD-10-CM

## 2019-03-07 LAB
PPDINDURATION: 0 MM (ref 0–5)
PPDREDNESS: 0 MM

## 2019-03-07 PROCEDURE — 99207 ZZC NO CHARGE NURSE ONLY: CPT

## 2019-03-07 NOTE — LETTER
Date today: 3/7/19  Regarding [facility]  Birthdate 1985    Patients mantoux read was negative today.    Lucila Dick,BSN- RN [facility] Triage.

## 2019-03-07 NOTE — NURSING NOTE
Mantoux result:  Lab Results   Component Value Date    PPDREDNESS 0 03/07/2019    PPDINDURATIO 0 03/07/2019     Lucila Dick RN [facility] Triage.

## 2019-04-16 ENCOUNTER — OFFICE VISIT (OUTPATIENT)
Dept: FAMILY MEDICINE | Facility: CLINIC | Age: 34
End: 2019-04-16
Payer: COMMERCIAL

## 2019-04-16 VITALS
DIASTOLIC BLOOD PRESSURE: 94 MMHG | SYSTOLIC BLOOD PRESSURE: 144 MMHG | TEMPERATURE: 98.7 F | HEART RATE: 83 BPM | OXYGEN SATURATION: 99 %

## 2019-04-16 DIAGNOSIS — E66.01 OBESITY, CLASS III, BMI 40-49.9 (MORBID OBESITY) (H): ICD-10-CM

## 2019-04-16 DIAGNOSIS — I10 ESSENTIAL HYPERTENSION WITH GOAL BLOOD PRESSURE LESS THAN 140/90: ICD-10-CM

## 2019-04-16 DIAGNOSIS — M25.561 ACUTE PAIN OF RIGHT KNEE: Primary | ICD-10-CM

## 2019-04-16 PROCEDURE — 99214 OFFICE O/P EST MOD 30 MIN: CPT | Performed by: NURSE PRACTITIONER

## 2019-04-16 RX ORDER — AMLODIPINE BESYLATE 5 MG/1
5 TABLET ORAL DAILY
Qty: 30 TABLET | Refills: 1 | Status: SHIPPED | OUTPATIENT
Start: 2019-04-16 | End: 2019-06-25

## 2019-04-16 ASSESSMENT — PAIN SCALES - GENERAL: PAINLEVEL: MILD PAIN (3)

## 2019-04-16 NOTE — LETTER
[address] [address]  Phone: [phone]  Fax: [phone]    April 16, 2019        Mckayla Jeff  [address] [place] [id number]          To whom it may concern:    RE: Mckayla Jeff    Patient was seen and treated today at our clinic. Please excuse her absences from work 4/15-4/16.    Please contact me for questions or concerns.      Sincerely,        MELANIA Davies CNP

## 2019-04-16 NOTE — PROGRESS NOTES
"  SUBJECTIVE:   Mckayla Jeff is a 33 year old female who presents to clinic today for the following   health issues:      Joint Pain    Onset: 4 days    Description:   Location: right knee  Character: Dull ache    Intensity: mild    Progression of Symptoms: better    Accompanying Signs & Symptoms:  Other symptoms: weakness of right knee on Sat. her leg gave out 2 times and swelling of the right knee on Sat.    History:   Previous similar pain: YES- During the winter she fell, was seen and had a xray and they told her LEFT knee was \"bone on bone\"    Precipitating factors:   Trauma or overuse: YES- walking    Alleviating factors:  Improved by: ice    Therapies Tried and outcome:       She was walking 4 days ago and felt \"pop\" to right anterior knee  Pain to anterior knee  Pain is improving  Worse with walking, weight bearing, knee extension  She took Tylenol PM which relieved the pain    She has a history of HTN for which she takes losartan-hctz  She developed a headache 4 hours ago  Fontal  Denies blurry vision, dizziness  Has noticed worsening edema past few days  Was also on amlodipine previously but then discontinued after BP improved some from weight loss  She has been more sedentary, poor diet choices and gaining weight    Additional history: as documented    Reviewed  and updated as needed this visit by clinical staff  Tobacco  Allergies  Meds  Med Hx  Surg Hx  Fam Hx  Soc Hx        Reviewed and updated as needed this visit by Provider         Patient Active Problem List   Diagnosis     Essential hypertension with goal blood pressure less than 140/90     CARDIOVASCULAR SCREENING; LDL GOAL LESS THAN 160     PCOS (polycystic ovarian syndrome)     Obesity, Class III, BMI 40-49.9 (morbid obesity) (H)     Synovitis of knee     Internal derangement of knee     Adjustment disorder with mixed anxiety and depressed mood     Health Care Home     Sprain of anterior talofibular ligament of left ankle     Past " Surgical History:   Procedure Laterality Date     NO HISTORY OF SURGERY         Social History     Tobacco Use     Smoking status: Never Smoker     Smokeless tobacco: Never Used   Substance Use Topics     Alcohol use: No     Family History   Problem Relation Age of Onset     Hypertension Mother      Obesity Mother      Depression Mother      Cancer - colorectal Father      Diabetes Maternal Aunt      Depression Maternal Aunt      Asthma No family hx of      Cancer No family hx of      Blood Disease No family hx of      Neurologic Disorder No family hx of      Eye Disorder No family hx of      Thyroid Disease No family hx of      Heart Disease No family hx of      Lipids No family hx of            ROS:  Constitutional, HEENT, cardiovascular, pulmonary, gi and gu systems are negative, except as otherwise noted.    OBJECTIVE:     BP (!) 144/94   Pulse 83   Temp 98.7  F (37.1  C) (Oral)   SpO2 99%   Breastfeeding? No   There is no height or weight on file to calculate BMI.  GENERAL: healthy, alert and no distress  RESP: lungs clear to auscultation - no rales, rhonchi or wheezes  CV: regular rate and rhythm, normal S1 S2, no S3 or S4, no murmur, click or rub, moderate non pitting edema  MS: Gait slow and antalgic. Tenderness to right anterior knee, inferomedial to patella, no patellar instability, negative stress testing, no laxity, extension and flexion intact and without difficulty  SKIN: no suspicious lesions or rashes  NEURO: Normal strength and tone, mentation intact and speech normal    Diagnostic Test Results:  none       ASSESSMENT/PLAN:   1. Acute pain of right knee  - Pain is improving, some pain with ambulation, but ROM intact and no swelling or signs of meniscal or ligament injury or fracture. Recommend physical therapy, tylenol for pain (avoid Tylenol PM if not needed), ice and regular activity. Weight loss important to reducing flares in future  - [facility] PT, HAND, AND CHIROPRACTIC REFERRAL; Future    2.  Essential hypertension with goal blood pressure less than 140/90  - BP improved upon recheck but still elevated. Will restart CCB. Discussed importance of weight loss, sodium reduction, elevation  - amLODIPine (NORVASC) 5 MG tablet; Take 1 tablet (5 mg) by mouth daily  Dispense: 30 tablet; Refill: 1    Patient Instructions   Start taking 1 tablet amlodipine daily  Follow up in 2 weeks      MELANIA Davies [facility]

## 2019-05-02 ENCOUNTER — THERAPY VISIT (OUTPATIENT)
Dept: PHYSICAL THERAPY | Facility: CLINIC | Age: 34
End: 2019-05-02
Payer: COMMERCIAL

## 2019-05-02 DIAGNOSIS — M25.561 RIGHT KNEE PAIN: ICD-10-CM

## 2019-05-02 DIAGNOSIS — S93.492A SPRAIN OF ANTERIOR TALOFIBULAR LIGAMENT OF LEFT ANKLE: ICD-10-CM

## 2019-05-02 DIAGNOSIS — M25.561 ACUTE PAIN OF RIGHT KNEE: ICD-10-CM

## 2019-05-02 PROCEDURE — 97110 THERAPEUTIC EXERCISES: CPT | Mod: GP | Performed by: PHYSICAL THERAPIST

## 2019-05-02 PROCEDURE — 97112 NEUROMUSCULAR REEDUCATION: CPT | Mod: GP | Performed by: PHYSICAL THERAPIST

## 2019-05-02 PROCEDURE — 97161 PT EVAL LOW COMPLEX 20 MIN: CPT | Mod: GP | Performed by: PHYSICAL THERAPIST

## 2019-05-02 ASSESSMENT — ACTIVITIES OF DAILY LIVING (ADL)
SWELLING: THE SYMPTOM AFFECTS MY ACTIVITY SLIGHTLY
SIT WITH YOUR KNEE BENT: ACTIVITY IS NOT DIFFICULT
HOW_WOULD_YOU_RATE_THE_OVERALL_FUNCTION_OF_YOUR_KNEE_DURING_YOUR_USUAL_DAILY_ACTIVITIES?: NEARLY NORMAL
KNEEL ON THE FRONT OF YOUR KNEE: ACTIVITY IS VERY DIFFICULT
GO UP STAIRS: ACTIVITY IS SOMEWHAT DIFFICULT
PAIN: THE SYMPTOM AFFECTS MY ACTIVITY SEVERELY
STAND: ACTIVITY IS MINIMALLY DIFFICULT
GO DOWN STAIRS: ACTIVITY IS MINIMALLY DIFFICULT
SQUAT: ACTIVITY IS VERY DIFFICULT
RISE FROM A CHAIR: ACTIVITY IS MINIMALLY DIFFICULT
AS_A_RESULT_OF_YOUR_KNEE_INJURY,_HOW_WOULD_YOU_RATE_YOUR_CURRENT_LEVEL_OF_DAILY_ACTIVITY?: NEARLY NORMAL
STIFFNESS: THE SYMPTOM AFFECTS MY ACTIVITY SLIGHTLY
GIVING WAY, BUCKLING OR SHIFTING OF KNEE: THE SYMPTOM AFFECTS MY ACTIVITY SEVERELY
WALK: ACTIVITY IS NOT DIFFICULT
LIMPING: THE SYMPTOM AFFECTS MY ACTIVITY MODERATELY

## 2019-05-06 PROBLEM — M25.561 RIGHT KNEE PAIN: Status: ACTIVE | Noted: 2019-05-06

## 2019-05-06 NOTE — PROGRESS NOTES
[facility] Initial Evaluation  Subjective:  The history is provided by the patient.   Mckayla Jeff is a 33 year old female with a right knee condition.  Condition occurred with:  Insidious onset.  Condition occurred: for unknown reasons.  This is a chronic and recurrent condition  Pt has had pain off and on for a couple of years. Most recent episode began about a month ago.    Patient reports pain:  Anterior, sub patellar and medial.  Radiates to:  Gluteals.  Pain is described as aching and sharp and is intermittent and reported as 8/10 (at worst).  Associated symptoms:  Loss of motion/stiffness and loss of strength. Worse during: no pattern.  Symptoms are exacerbated by ascending stairs, descending stairs, bending/squatting, sitting, standing, walking, transfers and kneeling and relieved by ice and NSAID's.  Since onset symptoms are gradually improving.  Special tests:  X-ray (no acute findings).      General health as reported by patient is fair.  Pertinent medical history includes:  Overweight and high blood pressure.  Medical allergies: no.  Other surgeries include:  None reported.  Current medications:  High blood pressure medication.  Current occupation is CNA/HHA.  Patient is working in normal job without restrictions.  Primary job tasks include:  Prolonged standing, lifting and repetitive tasks.    Barriers include:  None as reported by the patient.    Red flags:  None as reported by the patient.                        Objective:  Standing Alignment:              Knee:  Genu valgus R and genu valgus L          Flexibility/Screens:       Lower Extremity:      Decreased right lower extremity flexibility:  Piriformis and IT Band               Lumbar/SI Evaluation  ROM:  AROM Lumbar: normal                                                          Hip Evaluation  HIP AROM:  AROM:    Left Hip:     Normal    Right Hip:   Normal                    Hip Strength:  Hip Strength:   Left:     Normal  Right: : hip abd 4/5, hip ext 4/5.                                     Knee Evaluation:  ROM:  AROM: normal            Strength:         Quad Set Left: WNL    Pain:   Quad Set Right: Fair and delayed    Pain:    Special Tests:     Right knee positive for the following tests:  Patellar Compression; Patellar Tracking-Abduction Lateral; Orion's and IT Band Friction  Palpation:      Right knee tenderness present at:  IT Band; Gluteus Medius and Patellar Medial  Right knee tenderness not present at:  Medial Joint Line; Lateral Joint Line and Patellar Tendon  Edema:  Normal      Functional Testing:          Quad:    Single Leg Squat:  Left:      Right:        Bilateral Leg Squat:   Excessive anterior knee excursion and mild loss of control              General     ROS    Assessment/Plan:    Patient is a 33 year old female with right side knee complaints.    Patient has the following significant findings with corresponding treatment plan.                Diagnosis 1:  R knee pain    Pain -  hot/cold therapy, manual therapy, self management, education and home program  Decreased ROM/flexibility - manual therapy, therapeutic exercise and home program  Decreased strength - therapeutic exercise, therapeutic activities and home program    Therapy Evaluation Codes:   1) History comprised of:   Personal factors that impact the plan of care:      None.    Comorbidity factors that impact the plan of care are:      None.     Medications impacting care: None.  2) Examination of Body Systems comprised of:   Body structures and functions that impact the plan of care:      Hip, Knee and Lumbar spine.   Activity limitations that impact the plan of care are:      Jumping, Lifting, Sitting, Squatting/kneeling, Stairs, Standing and Walking.  3) Clinical presentation characteristics are:   Stable/Uncomplicated.  4) Decision-Making    Low complexity using standardized patient assessment instrument and/or measureable assessment of  functional outcome.  Cumulative Therapy Evaluation is: Low complexity.    Previous and current functional limitations:  (See Goal Flow Sheet for this information)    Short term and Long term goals: (See Goal Flow Sheet for this information)     Communication ability:  Patient appears to be able to clearly communicate and understand verbal and written communication and follow directions correctly.  Treatment Explanation - The following has been discussed with the patient:   RX ordered/plan of care  Anticipated outcomes  Possible risks and side effects  This patient would benefit from PT intervention to resume normal activities.   Rehab potential is good.    Frequency:  1 X week, once daily  Duration:  for 8 weeks  Discharge Plan:  Achieve all LTG.  Independent in home treatment program.  Reach maximal therapeutic benefit.    Please refer to the daily flowsheet for treatment today, total treatment time and time spent performing 1:1 timed codes.

## 2019-05-16 ENCOUNTER — THERAPY VISIT (OUTPATIENT)
Dept: PHYSICAL THERAPY | Facility: CLINIC | Age: 34
End: 2019-05-16
Payer: COMMERCIAL

## 2019-05-16 DIAGNOSIS — M25.561 RIGHT KNEE PAIN: ICD-10-CM

## 2019-05-16 PROCEDURE — 97110 THERAPEUTIC EXERCISES: CPT | Mod: GP | Performed by: PHYSICAL THERAPIST

## 2019-05-16 PROCEDURE — 97112 NEUROMUSCULAR REEDUCATION: CPT | Mod: GP | Performed by: PHYSICAL THERAPIST

## 2019-06-19 PROBLEM — S93.492A SPRAIN OF ANTERIOR TALOFIBULAR LIGAMENT OF LEFT ANKLE: Status: RESOLVED | Noted: 2019-01-17 | Resolved: 2019-06-19

## 2019-06-25 ENCOUNTER — OFFICE VISIT (OUTPATIENT)
Dept: FAMILY MEDICINE | Facility: CLINIC | Age: 34
End: 2019-06-25
Payer: COMMERCIAL

## 2019-06-25 VITALS
WEIGHT: 293 LBS | BODY MASS INDEX: 43.91 KG/M2 | SYSTOLIC BLOOD PRESSURE: 134 MMHG | HEART RATE: 98 BPM | OXYGEN SATURATION: 99 % | DIASTOLIC BLOOD PRESSURE: 87 MMHG | TEMPERATURE: 98.3 F

## 2019-06-25 DIAGNOSIS — I10 ESSENTIAL HYPERTENSION WITH GOAL BLOOD PRESSURE LESS THAN 140/90: ICD-10-CM

## 2019-06-25 DIAGNOSIS — E87.6 HYPOPOTASSEMIA: ICD-10-CM

## 2019-06-25 DIAGNOSIS — E28.2 PCOS (POLYCYSTIC OVARIAN SYNDROME): ICD-10-CM

## 2019-06-25 LAB
ANION GAP SERPL CALCULATED.3IONS-SCNC: 5 MMOL/L (ref 3–14)
BUN SERPL-MCNC: 9 MG/DL (ref 7–30)
CALCIUM SERPL-MCNC: 9 MG/DL (ref 8.5–10.1)
CHLORIDE SERPL-SCNC: 107 MMOL/L (ref 94–109)
CO2 SERPL-SCNC: 29 MMOL/L (ref 20–32)
CREAT SERPL-MCNC: 0.79 MG/DL (ref 0.52–1.04)
GFR SERPL CREATININE-BSD FRML MDRD: >90 ML/MIN/{1.73_M2}
GLUCOSE SERPL-MCNC: 137 MG/DL (ref 70–99)
HBA1C MFR BLD: 6 % (ref 0–5.6)
POTASSIUM SERPL-SCNC: 3.3 MMOL/L (ref 3.4–5.3)
SODIUM SERPL-SCNC: 141 MMOL/L (ref 133–144)

## 2019-06-25 PROCEDURE — 99213 OFFICE O/P EST LOW 20 MIN: CPT | Performed by: NURSE PRACTITIONER

## 2019-06-25 PROCEDURE — 83036 HEMOGLOBIN GLYCOSYLATED A1C: CPT | Performed by: NURSE PRACTITIONER

## 2019-06-25 PROCEDURE — 36415 COLL VENOUS BLD VENIPUNCTURE: CPT | Performed by: NURSE PRACTITIONER

## 2019-06-25 PROCEDURE — 80048 BASIC METABOLIC PNL TOTAL CA: CPT | Performed by: NURSE PRACTITIONER

## 2019-06-25 RX ORDER — LOSARTAN POTASSIUM AND HYDROCHLOROTHIAZIDE 25; 100 MG/1; MG/1
1 TABLET ORAL DAILY
Qty: 90 TABLET | Refills: 3 | Status: SHIPPED | OUTPATIENT
Start: 2019-06-25 | End: 2019-11-04

## 2019-06-25 RX ORDER — POTASSIUM CHLORIDE 750 MG/1
20 TABLET, EXTENDED RELEASE ORAL DAILY
Qty: 180 TABLET | Refills: 3 | Status: SHIPPED | OUTPATIENT
Start: 2019-06-25 | End: 2020-06-11

## 2019-06-25 ASSESSMENT — PAIN SCALES - GENERAL: PAINLEVEL: NO PAIN (0)

## 2019-06-25 NOTE — PROGRESS NOTES
Subjective     Mckayla Jeff is a 33 year old female who presents to clinic today for the following health issues:     HPI   Hypertension Follow-up      Do you check your blood pressure regularly outside of the clinic? Yes     Are you following a low salt diet? No    Are your blood pressures ever more than 140 on the top number (systolic) OR more   than 90 on the bottom number (diastolic), for example 140/90? No    Amount of exercise or physical activity: 2-3 days/week for an average of 45-60 minutes    Problems taking medications regularly: No    Medication side effects: none    Diet: Keto diet    I last saw her 4/16/19 for HTN  Recommended restarting Norvasc which was previously stopped after weight loss  /100 at our last visit  She never picked up from pharmacy and BP improved today  She is working on weight loss through diet  She requests refills of medications  She reports that she does not take metformin TID  She takes 1-2x/day as tolerated  Causes loose stools as TID        Patient Active Problem List   Diagnosis     Essential hypertension with goal blood pressure less than 140/90     CARDIOVASCULAR SCREENING; LDL GOAL LESS THAN 160     PCOS (polycystic ovarian syndrome)     Obesity, Class III, BMI 40-49.9 (morbid obesity) (H)     Synovitis of knee     Internal derangement of knee     Adjustment disorder with mixed anxiety and depressed mood     Health Care Home     Right knee pain     Past Surgical History:   Procedure Laterality Date     NO HISTORY OF SURGERY         Social History     Tobacco Use     Smoking status: Never Smoker     Smokeless tobacco: Never Used   Substance Use Topics     Alcohol use: No     Family History   Problem Relation Age of Onset     Hypertension Mother      Obesity Mother      Depression Mother      Cancer - colorectal Father      Diabetes Maternal Aunt      Depression Maternal Aunt      Asthma No family hx of      Cancer No family hx of      Blood Disease No family hx  of      Neurologic Disorder No family hx of      Eye Disorder No family hx of      Thyroid Disease No family hx of      Heart Disease No family hx of      Lipids No family hx of          Current Outpatient Medications   Medication Sig Dispense Refill     losartan-hydrochlorothiazide (HYZAAR) 100-25 MG tablet Take 1 tablet by mouth daily 90 tablet 3     metFORMIN (GLUCOPHAGE) 500 MG tablet Take 1 tablet (500 mg) by mouth 2 times daily (with meals) 180 tablet 3     potassium chloride ER (K-DUR/KLOR-CON M) 10 MEQ CR tablet Take 2 tablets (20 mEq) by mouth daily 180 tablet 3     ondansetron (ZOFRAN ODT) 4 MG ODT tab Take 1-2 tablets (4-8 mg) by mouth every 8 hours as needed for nausea (Patient not taking: Reported on 3/4/2019) 20 tablet 1         Reviewed and updated as needed this visit by Provider         Review of Systems   ROS COMP: Constitutional, HEENT, cardiovascular, pulmonary, gi and gu systems are negative, except as otherwise noted.      Objective    /87 (BP Location: Right arm, Patient Position: Chair, Cuff Size: Adult Large)   Pulse 98   Temp 98.3  F (36.8  C) (Oral)   Wt 138.8 kg (306 lb)   LMP 06/09/2019   SpO2 99%   Breastfeeding? No   BMI 43.91 kg/m    Body mass index is 43.91 kg/m .  Physical Exam   GENERAL: healthy, alert and no distress  RESP: lungs clear to auscultation - no rales, rhonchi or wheezes  CV: regular rate and rhythm, normal S1 S2, no S3 or S4, no murmur, click or rub, no peripheral edema and peripheral pulses strong  ABDOMEN: soft, nontender, no hepatosplenomegaly, no masses and bowel sounds normal    Diagnostic Test Results:  Labs reviewed in Epic        Assessment & Plan       ICD-10-CM    1. PCOS (polycystic ovarian syndrome) E28.2 metFORMIN (GLUCOPHAGE) 500 MG tablet     Basic metabolic panel     Hemoglobin A1c   2. Hypopotassemia E87.6 potassium chloride ER (K-DUR/KLOR-CON M) 10 MEQ CR tablet     Basic metabolic panel     Hemoglobin A1c   3. Essential hypertension with  "goal blood pressure less than 140/90 I10 losartan-hydrochlorothiazide (HYZAAR) 100-25 MG tablet     Basic metabolic panel     Hemoglobin A1c        BMI:   Estimated body mass index is 43.91 kg/m  as calculated from the following:    Height as of 3/4/19: 1.778 m (5' 10\").    Weight as of this encounter: 138.8 kg (306 lb).   Weight management plan: Discussed healthy diet and exercise guidelines        Continue with same medications  Ok for metformin 500 mg BID  Also discussed other medications options we could use to treat PCOS, including potential side effects  She would like to continue with metformin    MELANIA Davies [facility]      "

## 2019-06-27 NOTE — RESULT ENCOUNTER NOTE
Mckayla Jeff,    Your lab results have been released to [facility].   Your labs are stable. Continue with same medications.   Please call the clinic if you have any concerns [phone].    MELANIA Davies [facility]

## 2019-08-22 PROBLEM — M25.561 RIGHT KNEE PAIN: Status: RESOLVED | Noted: 2019-05-06 | Resolved: 2019-08-22

## 2019-08-22 NOTE — PROGRESS NOTES
Subjective:  HPI                    Objective:  System    Physical Exam    General     ROS    Assessment/Plan:    DISCHARGE REPORT    Progress reporting period is from 5/2/19 to 5/16/19.     SUBJECTIVE  Subjective: Pt states that her knee is feeling a lot better. Not noticing the pain during the day any more. Is only noticing it with stairs.    Current Pain level: 3/10   Initial Pain level: 8/10 (at worst)   Changes in function: No changes noted in function since last SOAP note   Adverse reactions: None     Patient has failed to return to therapy so current objective findings are unknown.  The subjective and objective information are from the last SOAP note on this patient.    OBJECTIVE  Objective: Independent in HEP. Fatigues quickly with HEP. Hypomobile R patella      ASSESSMENT/PLAN  Diagnosis 1:  R knee pain    Pain -  hot/cold therapy, manual therapy, self management, education and home program  Decreased ROM/flexibility - manual therapy, therapeutic exercise and home program  Decreased strength - therapeutic exercise, therapeutic activities and home program  STG/LTGs have been met or progress has been made towards goals:  Yes (See Goal flow sheet completed today.)  Assessment of Progress: The patient has not returned to therapy. Current status is unknown.  Self Management Plans:  Patient has been instructed in a home treatment program.  Patient  has been instructed in self management of symptoms.  Mckayla continues to require the following intervention to meet STG and LTG's: PT intervention is no longer required to meet STG/LTG.  The patient failed to complete scheduled/ordered appointments so current information is unknown.  We will discharge this patient from PT.    Recommendations:  This patient is ready to be discharged from therapy and continue their home treatment program.    Please refer to the daily flowsheet for treatment today, total treatment time and time spent performing 1:1 timed codes.

## 2019-09-13 ENCOUNTER — OFFICE VISIT (OUTPATIENT)
Dept: FAMILY MEDICINE | Facility: CLINIC | Age: 34
End: 2019-09-13
Payer: COMMERCIAL

## 2019-09-13 VITALS
HEART RATE: 89 BPM | BODY MASS INDEX: 43.91 KG/M2 | DIASTOLIC BLOOD PRESSURE: 100 MMHG | TEMPERATURE: 98.9 F | SYSTOLIC BLOOD PRESSURE: 150 MMHG | WEIGHT: 293 LBS

## 2019-09-13 DIAGNOSIS — N89.8 VAGINAL ODOR: Primary | ICD-10-CM

## 2019-09-13 DIAGNOSIS — B96.89 BACTERIAL VAGINOSIS: ICD-10-CM

## 2019-09-13 DIAGNOSIS — F32.1 MODERATE MAJOR DEPRESSION (H): ICD-10-CM

## 2019-09-13 DIAGNOSIS — N76.0 BACTERIAL VAGINOSIS: ICD-10-CM

## 2019-09-13 LAB
SPECIMEN SOURCE: ABNORMAL
WET PREP SPEC: ABNORMAL

## 2019-09-13 PROCEDURE — 87210 SMEAR WET MOUNT SALINE/INK: CPT | Performed by: FAMILY MEDICINE

## 2019-09-13 PROCEDURE — 99213 OFFICE O/P EST LOW 20 MIN: CPT | Performed by: FAMILY MEDICINE

## 2019-09-13 RX ORDER — METRONIDAZOLE 500 MG/1
500 TABLET ORAL 2 TIMES DAILY
Qty: 14 TABLET | Refills: 1 | Status: SHIPPED | OUTPATIENT
Start: 2019-09-13 | End: 2019-10-14

## 2019-09-13 ASSESSMENT — PATIENT HEALTH QUESTIONNAIRE - PHQ9: SUM OF ALL RESPONSES TO PHQ QUESTIONS 1-9: 19

## 2019-09-13 ASSESSMENT — PAIN SCALES - GENERAL: PAINLEVEL: NO PAIN (0)

## 2019-09-13 NOTE — PROGRESS NOTES
Subjective     Mckayla Jeff is a 33 year old female who presents to clinic today for the following health issues:    HPI   Vaginal Symptoms  Onset: ?    Description:  Vaginal Discharge: none   Itching (Pruritis): no   Burning sensation:  no   Odor: YES    Accompanying Signs & Symptoms:  Pain with Urination: no   Abdominal Pain: no   Fever: no     History:   Sexually active: YES  New Partner: no   Possibility of Pregnancy:  No    Precipitating factors:   Recent Antibiotic Use: no     Alleviating factors:  none    Therapies Tried and outcome: none        vaginal symptoms since Feb / March of this year  Off and on   [  Worse recently     Bad odor    Not sure about any discharge    Forgot to take blood pressure med this am     Urinating okay    Bowels okay    No std concern    Per labs, had trichomonas back in Jan  [         Reviewed and updated as needed this visit by Provider         Review of Systems   ROS COMP:        Objective    There were no vitals taken for this visit.  There is no height or weight on file to calculate BMI.  Physical Exam      Full physical not done     Mentation and affect are fine    No tremor of speech or extremity    Some lower abd pain recently    No costovertebral angle tenderness    Lots of anxiety also    ASSESSMENT / PLAN:  (N89.8) Vaginal odor  (primary encounter diagnosis)  Comment: wet prep done, self collect   Plan: Wet prep             (F32.1) Moderate major depression (H)  Comment: high score on phq9.  No thoughts of hurting self.  Per patient lots of anxiety also.  Prudent to start with counselor/ therapist.  She agreed.  She will call to schedule.   Plan: MENTAL HEALTH REFERRAL  - Adult; Outpatient         Treatment; Individual/Couples/Family/Group         [place] [facility] [place] [facility] [phone]; We will         contact you to schedule the appointment or         please call with any questions             (N76.0,  B96.89) Bacterial  vaginosis  Comment: pos for clue cells.  Discussed in detail.  Gave prescription.   Plan: metroNIDAZOLE (FLAGYL) 500 MG tablet        Follow up prn symptoms       I reviewed the patient's medications, allergies, medical history, family history, and social history.    Malcolm Cook MD

## 2019-09-13 NOTE — PATIENT INSTRUCTIONS
Take the metronidazole 2x daily for a week    One refill available if needed    Avoid alcohol while on prescription    Advise scheduling with counselor/ therapist ; call to schedule

## 2019-10-02 ENCOUNTER — TRANSFERRED RECORDS (OUTPATIENT)
Dept: HEALTH INFORMATION MANAGEMENT | Facility: CLINIC | Age: 34
End: 2019-10-02

## 2019-10-03 NOTE — PROGRESS NOTES
SUBJECTIVE:                                                    Mckayla Jeff is a 31 year old female who presents to clinic today for the following health issues:      STD check with no sx's or exposures that she is aware of at this time.No partner changes. No discharge, some nausea. No rash. Has had trichomonas in the past    Hypertension Follow-up      Outpatient blood pressures are being checked at home.  Results are 140s systolic.    Low Salt Diet: low salt       Problem list and histories reviewed & adjusted, as indicated.  Additional history: as documented    Patient Active Problem List   Diagnosis     Hypertension goal BP (blood pressure) < 140/90     CARDIOVASCULAR SCREENING; LDL GOAL LESS THAN 160     PCOS (polycystic ovarian syndrome)     Obesity, Class III, BMI 40-49.9 (morbid obesity) (H)     Synovitis of knee     Internal derangement of knee     Adjustment disorder with mixed anxiety and depressed mood     Health Care Home     Past Surgical History:   Procedure Laterality Date     NO HISTORY OF SURGERY         Social History   Substance Use Topics     Smoking status: Never Smoker     Smokeless tobacco: Never Used     Alcohol use No     Family History   Problem Relation Age of Onset     Hypertension Mother      Obesity Mother      Depression Mother      Cancer - colorectal Father      DIABETES Maternal Aunt      Depression Maternal Aunt      Asthma No family hx of      CANCER No family hx of      Blood Disease No family hx of      Neurologic Disorder No family hx of      Eye Disorder No family hx of      Thyroid Disease No family hx of      HEART DISEASE No family hx of      Lipids No family hx of          Current Outpatient Prescriptions   Medication Sig Dispense Refill     potassium chloride SA (K-DUR/KLOR-CON M) 10 MEQ CR tablet Take 1 tablet (10 mEq) by mouth daily 90 tablet 1     losartan-hydrochlorothiazide (HYZAAR) 100-25 MG per tablet Take 1 tablet by mouth daily 90 tablet 3     metFORMIN  "(GLUCOPHAGE) 500 MG tablet Take 1 tablet (500 mg) by mouth 3 times daily (with meals) 270 tablet 3     cyclobenzaprine (FLEXERIL) 10 MG tablet Take 1 tablet (10 mg) by mouth nightly as needed for muscle spasms 14 tablet 1     naproxen (NAPROSYN) 500 MG tablet Take 1 tablet (500 mg) by mouth 2 times daily as needed for moderate pain 30 tablet 1     No Known Allergies  BP Readings from Last 3 Encounters:   06/21/17 139/86   04/11/17 124/86   01/28/17 (!) 154/94    Wt Readings from Last 3 Encounters:   06/21/17 (!) 306 lb (138.8 kg)   04/11/17 (!) 308 lb (139.7 kg)   01/28/17 (!) 313 lb (142 kg)                    Reviewed and updated as needed this visit by clinical staff  Tobacco  Allergies  Meds       Reviewed and updated as needed this visit by Provider         ROS:  Constitutional, neuro, ENT, endocrine, pulmonary, cardiac, gastrointestinal, genitourinary, musculoskeletal, integument and psychiatric systems are negative, except as otherwise noted.    OBJECTIVE:                                                    /86  Pulse 101  Temp 97.4  F (36.3  C) (Oral)  Ht 5' 11\" (1.803 m)  Wt (!) 306 lb (138.8 kg)  LMP  (LMP Unknown)  SpO2 98%  Breastfeeding? No  BMI 42.68 kg/m2  Body mass index is 42.68 kg/(m^2).  GENERAL APPEARANCE: healthy, alert and no distress  EYES: Eyes grossly normal to inspection and conjunctivae and sclerae normal  NECK: no adenopathy  RESP: lungs clear to auscultation - no rales, rhonchi or wheezes  CV: regular rates and rhythm and normal S1 S2, no S3 or S4  ABDOMEN: soft, non-tender  MS: extremities normal- no gross deformities noted and peripheral pulses normal  SKIN: no suspicious lesions or rashes  NEURO: Normal strength and tone, mentation intact and speech normal  PSYCH: mentation appears normal and affect normal/bright    Diagnostic test results:  Diagnostic Test Results:  No results found for this or any previous visit (from the past 24 hour(s)).     ASSESSMENT/PLAN:        "                                             1. Screen for STD (sexually transmitted disease)  -Await labs   - HIV Antigen Antibody Combo  - Anti Treponema  - Chlamydia trachomatis PCR  - Neisseria gonorrhoeae PCR  - Hepatitis C antibody  - Wet prep  - Hepatitis B surface antigen  - Hepatitis B Surface Antibody    2. Obesity, Class III, BMI 40-49.9 (morbid obesity) (H)  -Intermittent exercise  -Goal of 150 minutes of moderate physical activity per week discussed     3. Hypertension goal BP (blood pressure) < 140/90  -Readings are at goal in clinic today  -Continue current medication       Follow up with Provider - Will contact with labs when available      Shiloh Ochoa MD MPH    [facility]   [Negative] : Gastrointestinal [FreeTextEntry5] : no chest pain or orthopnea  [de-identified] : takes PRN oxycodone for pain back

## 2019-10-07 ENCOUNTER — OFFICE VISIT (OUTPATIENT)
Dept: URGENT CARE | Facility: URGENT CARE | Age: 34
End: 2019-10-07
Payer: OTHER MISCELLANEOUS

## 2019-10-07 VITALS
WEIGHT: 293 LBS | DIASTOLIC BLOOD PRESSURE: 86 MMHG | BODY MASS INDEX: 43.62 KG/M2 | OXYGEN SATURATION: 97 % | TEMPERATURE: 98.1 F | HEART RATE: 96 BPM | SYSTOLIC BLOOD PRESSURE: 142 MMHG

## 2019-10-07 DIAGNOSIS — S76.311A STRAIN OF RIGHT HAMSTRING MUSCLE, INITIAL ENCOUNTER: Primary | ICD-10-CM

## 2019-10-07 PROCEDURE — 99213 OFFICE O/P EST LOW 20 MIN: CPT | Performed by: FAMILY MEDICINE

## 2019-10-07 ASSESSMENT — ENCOUNTER SYMPTOMS
CHILLS: 0
SORE THROAT: 0
SHORTNESS OF BREATH: 0
FEVER: 0
HEADACHES: 0
RHINORRHEA: 0
COUGH: 0
VOMITING: 0
NAUSEA: 0
DIARRHEA: 0

## 2019-10-07 ASSESSMENT — PAIN SCALES - GENERAL: PAINLEVEL: MODERATE PAIN (5)

## 2019-10-07 NOTE — LETTER
[facility]  [address]  [facility] [address]  Phone: [phone]    October 7, 2019        Mckayla Jeff  [facility] [facility] [address]          To whom it may concern:    RE: Mckayla Jeff    Patient was seen and treated today at our clinic for a hamstring strain or pull. She may return to work tomorrow, however, she will avoid all painful bending from the waist and all patient transfers or heavy lifting. She may need to use the elevator rather than stairs, if painful, over the next 7 days. May return to full duty in 7 days time or sooner if pain improves. Follow-up if symptoms persist or worsen.          Sincerely,        Chris Wynn MD

## 2019-10-07 NOTE — PROGRESS NOTES
SUBJECTIVE:   Mckayla Jeff is a 34 year old female presenting with a chief complaint of   Chief Complaint   Patient presents with     Musculoskeletal Problem     Pain in right upper leg. 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           Review of Systems   Constitutional: Negative for chills and fever.   HENT: Negative for congestion, ear pain, rhinorrhea and sore throat.    Respiratory: Negative for cough and shortness of breath.    Gastrointestinal: Negative for diarrhea, nausea and vomiting.   Musculoskeletal:        Per hpi    Neurological: Negative for headaches.       Past Medical History:   Diagnosis Date     Contusion of knee 12/9/2014     Hypertension      Obesity      PCOS (polycystic ovarian syndrome)      Family History   Problem Relation Age of Onset     Hypertension Mother      Obesity Mother      Depression Mother      Cancer - colorectal Father      Diabetes Maternal Aunt      Depression Maternal Aunt      Asthma No family hx of      Cancer No family hx of      Blood Disease No family hx of      Neurologic Disorder No family hx of      Eye Disorder No family hx of      Thyroid Disease No family hx of      Heart Disease No family hx of      Lipids No family hx of      Current Outpatient Medications   Medication Sig Dispense Refill     losartan-hydrochlorothiazide (HYZAAR) 100-25 MG tablet Take 1 tablet by mouth daily 90 tablet 3     metFORMIN (GLUCOPHAGE) 500 MG tablet Take 1 tablet (500 mg) by mouth 2 times daily (with meals) 180 tablet 3     potassium chloride ER (K-DUR/KLOR-CON M) 10 MEQ CR tablet Take 2 tablets (20 mEq) by mouth daily 180 tablet 3     ondansetron (ZOFRAN ODT) 4 MG ODT tab Take 1-2 tablets (4-8 mg) by mouth every 8 hours as needed for nausea (Patient not taking: Reported on 10/7/2019) 20 tablet 1     Social  History     Tobacco Use     Smoking status: Never Smoker     Smokeless tobacco: Never Used   Substance Use Topics     Alcohol use: No       OBJECTIVE  BP (!) 142/86   Pulse 96   Temp 98.1  F (36.7  C) (Oral)   Wt 137.9 kg (304 lb)   SpO2 97%   BMI 43.62 kg/m      Physical Exam  Cardiovascular:      Rate and Rhythm: Normal rate.   Musculoskeletal:         General: Tenderness (focally at the right midhamstring area medially ) present.      Comments: ROM limited some due to pain on active flexion and increased pain on resisted flexion along rectus femoris muscle belly.    Skin:     General: Skin is warm.      Capillary Refill: Capillary refill takes less than 2 seconds.   Neurological:      Mental Status: She is alert.             ASSESSMENT:    ICD-10-CM    1. Strain of right hamstring muscle, initial encounter S76.311A         PLAN:  Proper nsaid or tylenol use.   Work note with restrictions provided to return to work.   Ice, rest and activity modification advised.   Patient educational/instructional material provided including reasons for follow-up   Chris Wynn MD

## 2019-10-14 ENCOUNTER — OFFICE VISIT (OUTPATIENT)
Dept: FAMILY MEDICINE | Facility: CLINIC | Age: 34
End: 2019-10-14
Payer: COMMERCIAL

## 2019-10-14 VITALS
RESPIRATION RATE: 16 BRPM | OXYGEN SATURATION: 99 % | SYSTOLIC BLOOD PRESSURE: 131 MMHG | TEMPERATURE: 97.7 F | DIASTOLIC BLOOD PRESSURE: 84 MMHG | BODY MASS INDEX: 43.1 KG/M2 | HEART RATE: 86 BPM | WEIGHT: 293 LBS

## 2019-10-14 DIAGNOSIS — Z23 NEED FOR PROPHYLACTIC VACCINATION AND INOCULATION AGAINST INFLUENZA: ICD-10-CM

## 2019-10-14 DIAGNOSIS — I10 ESSENTIAL HYPERTENSION WITH GOAL BLOOD PRESSURE LESS THAN 140/90: ICD-10-CM

## 2019-10-14 DIAGNOSIS — R07.89 OTHER CHEST PAIN: ICD-10-CM

## 2019-10-14 DIAGNOSIS — F33.1 MODERATE RECURRENT MAJOR DEPRESSION (H): ICD-10-CM

## 2019-10-14 DIAGNOSIS — S76.311D STRAIN OF RIGHT HAMSTRING MUSCLE, SUBSEQUENT ENCOUNTER: Primary | ICD-10-CM

## 2019-10-14 PROCEDURE — 90471 IMMUNIZATION ADMIN: CPT | Performed by: NURSE PRACTITIONER

## 2019-10-14 PROCEDURE — 90686 IIV4 VACC NO PRSV 0.5 ML IM: CPT | Performed by: NURSE PRACTITIONER

## 2019-10-14 PROCEDURE — 99214 OFFICE O/P EST MOD 30 MIN: CPT | Mod: 25 | Performed by: NURSE PRACTITIONER

## 2019-10-14 PROCEDURE — 99213 OFFICE O/P EST LOW 20 MIN: CPT | Performed by: NURSE PRACTITIONER

## 2019-10-14 RX ORDER — CITALOPRAM HYDROBROMIDE 10 MG/1
10 TABLET ORAL DAILY
Qty: 30 TABLET | Refills: 1 | Status: SHIPPED | OUTPATIENT
Start: 2019-10-14 | End: 2022-08-04

## 2019-10-14 ASSESSMENT — PAIN SCALES - GENERAL: PAINLEVEL: MILD PAIN (2)

## 2019-10-14 NOTE — LETTER
[address] [address]  Phone: [phone]    October 14, 2019        Mckayla Jeff  [facility] [facility] [address]          To whom it may concern:    RE: Mckayla Jeff    Patient was seen and treated today at our clinic. 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    Please contact me for questions or concerns.      Sincerely,        MELANIA Domingo CNP

## 2019-10-14 NOTE — PROGRESS NOTES
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    Alleviating factors:  Improved by: Ice, Elevated, Tylenol    Therapies Tried and outcome: Ibuprofen - no help  She's been on lifting restriction at work and her symptoms are improving but then yesterday she tried to increase her activity and her pain came back.  She is wanting to get back to kick boxing.      Social History     Tobacco Use     Smoking status: Never Smoker     Smokeless tobacco: Never Used   Substance Use Topics     Alcohol use: No     Drug use: No         Patient Active Problem List   Diagnosis     Essential hypertension with goal blood pressure less than 140/90     CARDIOVASCULAR SCREENING; LDL GOAL LESS THAN 160     PCOS (polycystic ovarian syndrome)     Obesity, Class III, BMI 40-49.9 (morbid obesity) (H)     Synovitis of knee     Internal derangement of knee     Adjustment disorder with mixed anxiety and depressed mood     Health Care Home     Moderate recurrent major depression (H)     Other chest pain     Past Surgical History:   Procedure Laterality Date     NO HISTORY OF SURGERY         Social History     Tobacco Use     Smoking status: Never Smoker     Smokeless tobacco: Never Used   Substance Use Topics     Alcohol use: No     Family History   Problem Relation Age of Onset     Hypertension Mother      Obesity Mother      Depression Mother      Cancer -  colorectal Father      Diabetes Maternal Aunt      Depression Maternal Aunt      Asthma No family hx of      Cancer No family hx of      Blood Disease No family hx of      Neurologic Disorder No family hx of      Eye Disorder No family hx of      Thyroid Disease No family hx of      Heart Disease No family hx of      Lipids No family hx of          Current Outpatient Medications   Medication Sig Dispense Refill     citalopram (CELEXA) 10 MG tablet Take 1 tablet (10 mg) by mouth daily 30 tablet 1     losartan-hydrochlorothiazide (HYZAAR) 100-25 MG tablet Take 1 tablet by mouth daily 90 tablet 3     metFORMIN (GLUCOPHAGE) 500 MG tablet Take 1 tablet (500 mg) by mouth 2 times daily (with meals) 180 tablet 3     ondansetron (ZOFRAN ODT) 4 MG ODT tab Take 1-2 tablets (4-8 mg) by mouth every 8 hours as needed for nausea 20 tablet 1     potassium chloride ER (K-DUR/KLOR-CON M) 10 MEQ CR tablet Take 2 tablets (20 mEq) by mouth daily 180 tablet 3     BP Readings from Last 3 Encounters:   10/14/19 131/84   10/07/19 (!) 142/86   09/13/19 (!) 150/100    Wt Readings from Last 3 Encounters:   10/14/19 136.3 kg (300 lb 6.4 oz)   10/07/19 137.9 kg (304 lb)   09/13/19 138.8 kg (306 lb)            Reviewed and updated as needed this visit by Provider         Review of Systems   ROS COMP: Constitutional, HEENT, cardiovascular, pulmonary, gi and gu systems are negative, except as otherwise noted.      Objective    /84 (BP Location: Left arm, Patient Position: Sitting, Cuff Size: Adult Large)   Pulse 86   Temp 97.7  F (36.5  C) (Oral)   Resp 16   Wt 136.3 kg (300 lb 6.4 oz)   SpO2 99%   BMI 43.10 kg/m    Body mass index is 43.1 kg/m .  Physical Exam   GENERAL: healthy, alert and no distress  EYES: Eyes grossly normal to inspection, PERRL and conjunctivae and sclerae normal  HENT: ear canals and TM's normal, nose and mouth without ulcers or lesions  NECK: no adenopathy, no asymmetry, masses, or scars and thyroid normal to  palpation  RESP: lungs clear to auscultation - no rales, rhonchi or wheezes  CV: regular rate and rhythm, normal S1 S2, no S3 or S4, no murmur, click or rub, no peripheral edema and peripheral pulses strong  MS:TTP at right medial  mid hamstring, decreased flexion due to pain, also with resisted flexion along rectus femoris muscle belly. No bruising or swelling noted.   Otherwise, no gross musculoskeletal defects noted, no edema  SKIN: no suspicious lesions or rashes  NEURO: Normal strength and tone, mentation intact and speech normal  BACK: no CVA tenderness, no paralumbar tenderness  PSYCH: mentation appears normal, affect normal/bright  LYMPH: normal ant/post cervical, supraclavicular nodes    Diagnostic Test Results:  Labs reviewed in Epic  none         Assessment & Plan     1. Strain of right hamstring muscle, subsequent encounter  Continue with work restrictions (new letter given to patient) Ok to continue with tylenol prn pain, rest, ice, referring to physical therapy.  - [facility]; Future        Work on weight loss  Regular exercise  See Patient Instructions    Return in about 3 weeks (around 11/4/2019), or if symptoms worsen or fail to improve, for Routine Visit.    MELANIA Domingo [facility]

## 2019-10-14 NOTE — PATIENT INSTRUCTIONS
Patient Education     Depression: Tips to Help Yourself    As your healthcare providers help treat your depression, you can also help yourself. Keep in mind that your illness affects you emotionally, physically, mentally, and socially. So full recovery will take time. Take care of your body and your soul, and be patient with yourself as you get better.  Self-care    Educate yourself. Read about treatment and medicine options. If you have the energy, attend local conferences or support groups. Keep a list of useful websites and helpful books and use them as needed. This illness is not your fault. Don t blame yourself for your depression.    Manage early symptoms. If you notice symptoms returning, experience triggers, or identify other factors that may lead to a depressive episode, get help as soon as possible. Ask trusted friends and family to monitor your behavior and let you know if they see anything of concern.    Work with your provider. Find a provider you can trust. Communicate honestly with that person and share information on your treatment for depression and your reaction to medicines.    Be prepared for a crisis. Know what to do if you experience a crisis. Keep the phone number of a crisis hotline and know the location of your community's urgent care centers and the closest emergency department.    Hold off on big decisions. Depression can cloud your judgment. So wait until you feel better before making major life decisions, such as changing jobs, moving, or getting  or .    Be patient. Recovering from depression is a process. Don t be discouraged if it takes some time to feel better.    Keep it simple. Depression saps your energy and concentration. So you won t be able to do all the things you used to do. Set small goals and do what you can.    Be with others. Don t isolate yourself--you ll only feel worse. Try to be with other people. And take part in fun activities when you can. Go to a  movie, ballgame, [employer] service, or social event. Talk openly with people you can trust. And accept help when it s offered.  Take care of your body  People with depression often lose the desire to take care of themselves. That only makes their problems worse. During treatment and afterward, make a point to:    Exercise. It s a great way to take care of your body. And studies have shown that exercise helps fight depression.    Avoid drugs and alcohol. These may ease the pain in the short term. But they ll only make your problems worse in the long run.    Get relief from stress. Ask your healthcare provider for relaxation exercises and techniques to help relieve stress.    Eat right. A balanced and healthy diet helps keep your body healthy.  Date Last Reviewed: 1/1/2017 2000-2018 [employer]. [address] [place] [address]. All rights reserved. This information is not intended as a substitute for professional medical care. Always follow your healthcare professional's instructions.           Patient Education     Patient Education    Citalopram Hydrobromide Oral solution    Citalopram Hydrobromide Oral tablet  Citalopram Hydrobromide Oral tablet  What is this medicine?  CITALOPRAM (sye CHER oh pram) is a medicine for depression.  This medicine may be used for other purposes; ask your health care provider or pharmacist if you have questions.  What should I tell my health care provider before I take this medicine?  They need to know if you have any of these conditions:    bipolar disorder or a family history of bipolar disorder    diabetes    glaucoma    heart disease    history of irregular heartbeat    kidney or liver disease    low levels of magnesium or potassium in the blood    receiving electroconvulsive therapy    seizures (convulsions)    suicidal thoughts or a previous suicide attempt    an unusual or allergic reaction to citalopram, escitalopram, other medicines, foods, dyes, or  preservatives    pregnant or trying to become pregnant    breast-feeding  How should I use this medicine?  Take this medicine by mouth with a glass of water. Follow the directions on the prescription label. You can take it with or without food. Take your medicine at regular intervals. Do not take your medicine more often than directed. Do not stop taking this medicine suddenly except upon the advice of your doctor. Stopping this medicine too quickly may cause serious side effects or your condition may worsen.  A special MedGuide will be given to you by the pharmacist with each prescription and refill. Be sure to read this information carefully each time.  Talk to your pediatrician regarding the use of this medicine in children. Special care may be needed.  Patients over 60 years old may have a stronger reaction and need a smaller dose.  Overdosage: If you think you have taken too much of this medicine contact a poison control center or emergency room at once.  NOTE: This medicine is only for you. Do not share this medicine with others.  What if I miss a dose?  If you miss a dose, take it as soon as you can. If it is almost time for your next dose, take only that dose. Do not take double or extra doses.  What may interact with this medicine?  Do not take this medicine with any of the following medications:    cisapride    dofetlide    dronedarone    escitalopram    linezolid    MAOIs like Carbex, Eldepryl, Marplan, Nardil, and Parnate    methylene blue (injected into a vein)    pimozide    posaconazole    thioridazine    ziprasidone  This medicine may also interact with the following medications:    alcohol    aspirin and aspirin-like medicines    carbamazepine    certain medicines for depression, anxiety, or psychotic disturbances    certain medicines for fungal infections like ketoconazole and itraconazole    certain medicines used to treat infections like chloroquine, clarithromycin, erythromycin,  pentamidine    certain medicines for migraine headaches like almotriptan, eletriptan, frovatriptan, naratriptan, rizatriptan, sumatriptan, zolmitriptan    cimetidine    diuretics    fentanyl    furazolidone    isoniazid    lithium    medicines for sleep    medicines that treat or prevent blood clots like warfarin, enoxaparin, and dalteparin    methadone    metoprolol    NSAIDs, medicines for pain and inflammation, like ibuprofen or naproxen    omeprazole    other medicines that prolong the QT interval (cause an abnormal heart rhythm)    procarbazine    rasagiline    supplements like [place]'s wort, kava kava, valerian    tramadol    tryptophan  This list may not describe all possible interactions. Give your health care provider a list of all the medicines, herbs, non-prescription drugs, or dietary supplements you use. Also tell them if you smoke, drink alcohol, or use illegal drugs. Some items may interact with your medicine.  What should I watch for while using this medicine?  Tell your doctor if your symptoms do not get better or if they get worse. Visit your doctor or health care professional for regular checks on your progress. Because it may take several weeks to see the full effects of this medicine, it is important to continue your treatment as prescribed by your doctor.  Patients and their families should watch out for new or worsening thoughts of suicide or depression. Also watch out for sudden changes in feelings such as feeling anxious, agitated, panicky, irritable, hostile, aggressive, impulsive, severely restless, overly excited and hyperactive, or not being able to sleep. If this happens, especially at the beginning of treatment or after a change in dose, call your health care professional.  You may get drowsy or dizzy. Do not drive, use machinery, or do anything that needs mental alertness until you know how this medicine affects you. Do not stand or sit up quickly, especially if you are an older  patient. This reduces the risk of dizzy or fainting spells. Alcohol may interfere with the effect of this medicine. Avoid alcoholic drinks.  Your mouth may get dry. Chewing sugarless gum or sucking hard candy, and drinking plenty of water will help. Contact your doctor if the problem does not go away or is severe.  What side effects may I notice from receiving this medicine?  Side effects that you should report to your doctor or health care professional as soon as possible:    allergic reactions like skin rash, itching or hives, swelling of the face, lips, or tongue    chest pain    confusion    dizziness    fast, irregular heartbeat    fast talking and excited feelings or actions that are out of control    feeling faint or lightheaded, falls    hallucination, loss of contact with reality    seizures    shortness of breath    suicidal thoughts or other mood changes    unusual bleeding or bruising  Side effects that usually do not require medical attention (report to your doctor or health care professional if they continue or are bothersome):    blurred vision    change in appetite    change in sex drive or performance    headache    increased sweating    nausea    trouble sleeping  This list may not describe all possible side effects. Call your doctor for medical advice about side effects. You may report side effects to FDA at 3-089-FDA-9750.  Where should I keep my medicine?  Keep out of reach of children.  Store at room temperature between 15 and 30 degrees C (59 and 86 degrees F). Throw away any unused medicine after the expiration date.  NOTE:This sheet is a summary. It may not cover all possible information. If you have questions about this medicine, talk to your doctor, pharmacist, or health care provider. Copyright  2016 [employer]

## 2019-10-28 ENCOUNTER — OFFICE VISIT (OUTPATIENT)
Dept: FAMILY MEDICINE | Facility: CLINIC | Age: 34
End: 2019-10-28
Payer: COMMERCIAL

## 2019-10-28 VITALS
RESPIRATION RATE: 14 BRPM | TEMPERATURE: 98 F | DIASTOLIC BLOOD PRESSURE: 82 MMHG | OXYGEN SATURATION: 98 % | WEIGHT: 293 LBS | BODY MASS INDEX: 41.95 KG/M2 | HEIGHT: 70 IN | SYSTOLIC BLOOD PRESSURE: 120 MMHG | HEART RATE: 82 BPM

## 2019-10-28 PROCEDURE — 99213 OFFICE O/P EST LOW 20 MIN: CPT | Performed by: NURSE PRACTITIONER

## 2019-10-28 ASSESSMENT — MIFFLIN-ST. JEOR: SCORE: 2113.36

## 2019-10-28 NOTE — PROGRESS NOTES
Subjective     Mckayla Bloom is a 34 year old female who presents to clinic today for the following health issues:    HPI     Hypertension Follow-up      Do you check your blood pressure regularly outside of the clinic? Yes     Are you following a low salt diet? Yes    Are your blood pressures ever more than 140 on the top number (systolic) OR more   than 90 on the bottom number (diastolic), for example 140/90? No  Depression Followup    How are you doing with your depression since your last visit? No change    Are you having other symptoms that might be associated with depression? Yes:  . POHQ-9-19 19     Have you had a significant life event?  No     Are you feeling anxious or having panic attacks?   Yes:  having panic attacks and anxiety    Do you have any concerns with your use of alcohol or other drugs? No      ER follow up- patient seen at [facility] 19-pain was retrosternal without radiation, is worse with activity, lasts 20 min, goes away on its own .  No leg pain, swelling. She denies current symptoms.                                                Exercise Stress Echo Report  MCKAYLA BLOOM  Excellian ID: [id number] Age: 34 : 1985 Sonographer: MARGE  Exam Date: 10/02/2019 08:38 Gender: F RN/Ex. Physiologist: ELIU  Accession #: [id number] Height: 71 in BSA: 2.53 m  Monitoring Provider: ZURI GONZALEZ  Weight: 308 lbs BMI: 43 kg/m  Ordering Provider: ANURAG CHAVIRA    Site: [facility]  Location: Outpatient  Procedure Components: Stress Echo with contrast, Color Doppler, Limited Spectral Doppler  Indications: Chest pain  Technical Quality: Contrast: Definity    FINAL CONCLUSIONS  Technically difficult study - contrast was used to enhance endocardial definition secondary to   suboptimal image quality.  Non-diagnostic Stress Echocardiogram due to inability to image all myocardial wall segments   during stress.  Baseline (rest) echo images without any significant  abnormalities.  Poor exercise tolerance, achieving only 7.0 METs and 91.9% of max predicted heart rate.  Consider alternative/additional assessment for obstructive epicardial CAD with CT Coronary   angiography or myocardial perfusion  imaging.        Patient History: No known CAD  Risk Factors: Hypertension, Diabetes Mellitus, Obesity  Cardiac History: None  Presenting Symptoms: Chest discomfort, Visual disturbance  Cardiac Meds: Amlodipine  Meds past 24 hrs: Amlodipine    STRESS TEST SUMMARY  Protocol: Cortes Exercise Duration (min:sec): 06:42 METs:  Angina Score: Max ST Deviation (mm): Duke TM Score:  Resting HR: 99 Resting BP: 149 / 94 Position: Sitting % Predicted Exercise Capacity  Peak HR: 170 Peak BP: 164 / 80 % MPHR: 92 Double Product: 47845 MPHR: 185 Target HR: 157  Recovery HR: 101 Recovery BP: 140 / 80  BP Response: Normal HR Response: Normal  Stress Termination: General fatigue  Stress Symptoms: Exercise did not seem to reproduce presenting chest pain or symptoms.   Exercise produced expected fatigue.  Meds Given: NONE    ECHOCARDIOGRAPHIC FINDINGS  Rest Findings: Normal resting LV size and systolic function. There are no resting wall motion   abnormalities noted.  Stress Findings: Non-diagnostic due to poor image quality.  Valvular Heart Disease  Mitral Valve: Normal mitral valve without significant stenosis or regurgitation.  Aortic Valve: Normal trileaflet aortic valve without significant stenosis or regurgitation.  Tricuspid Valve: No significant tricuspid regurgitation.          ECG ANALYSIS  Resting ECG: Normal resting 12-lead ECG.  Stress ECG: No diagnostic ECG evidence of ischemia.  Arrhythmia: Rare PVCs were noted during recovery.    Patient Active Problem List   Diagnosis     Essential hypertension with goal blood pressure less than 140/90     CARDIOVASCULAR SCREENING; LDL GOAL LESS THAN 160     PCOS (polycystic ovarian syndrome)     Obesity, Class III, BMI 40-49.9 (morbid obesity) (H)      Synovitis of knee     Internal derangement of knee     Adjustment disorder with mixed anxiety and depressed mood     Health Care Home     Moderate recurrent major depression (H)     Other chest pain     Past Surgical History:   Procedure Laterality Date     NO HISTORY OF SURGERY         Social History     Tobacco Use     Smoking status: Never Smoker     Smokeless tobacco: Never Used   Substance Use Topics     Alcohol use: No     Family History   Problem Relation Age of Onset     Hypertension Mother      Obesity Mother      Depression Mother      Cancer - colorectal Father      Diabetes Maternal Aunt      Depression Maternal Aunt      Asthma No family hx of      Cancer No family hx of      Blood Disease No family hx of      Neurologic Disorder No family hx of      Eye Disorder No family hx of      Thyroid Disease No family hx of      Heart Disease No family hx of      Lipids No family hx of          PHQ 11/3/2015 3/4/2019 9/13/2019   PHQ-9 Total Score 15 16 19   Q9: Thoughts of better off dead/self-harm past 2 weeks Several days Not at all Not at all     Current Outpatient Medications   Medication Sig Dispense Refill     citalopram (CELEXA) 10 MG tablet Take 1 tablet (10 mg) by mouth daily 30 tablet 1     losartan-hydrochlorothiazide (HYZAAR) 100-25 MG tablet Take 1 tablet by mouth daily 90 tablet 3     metFORMIN (GLUCOPHAGE) 500 MG tablet Take 1 tablet (500 mg) by mouth 2 times daily (with meals) 180 tablet 3     ondansetron (ZOFRAN ODT) 4 MG ODT tab Take 1-2 tablets (4-8 mg) by mouth every 8 hours as needed for nausea 20 tablet 1     potassium chloride ER (K-DUR/KLOR-CON M) 10 MEQ CR tablet Take 2 tablets (20 mEq) by mouth daily 180 tablet 3     BP Readings from Last 3 Encounters:   10/14/19 131/84   10/07/19 (!) 142/86   09/13/19 (!) 150/100    Wt Readings from Last 3 Encounters:   10/14/19 136.3 kg (300 lb 6.4 oz)   10/07/19 137.9 kg (304 lb)   09/13/19 138.8 kg (306 lb)                      Reviewed and updated  as needed this visit by Provider  Tobacco  Allergies  Meds  Problems  Med Hx  Surg Hx  Fam Hx         Review of Systems   ROS COMP: Constitutional, HEENT, cardiovascular, pulmonary, gi and gu systems are negative, except as otherwise noted.      Objective    /84 (BP Location: Left arm, Patient Position: Sitting, Cuff Size: Adult Large)   Pulse 86   Temp 97.7  F (36.5  C) (Oral)   Resp 16   Wt 136.3 kg (300 lb 6.4 oz)   SpO2 99%   BMI 43.10 kg/m    Body mass index is 43.1 kg/m .  Physical Exam   GENERAL: healthy, alert and no distress  EYES: Eyes grossly normal to inspection, PERRL and conjunctivae and sclerae normal  HENT: ear canals and TM's normal, nose and mouth without ulcers or lesions  NECK: no adenopathy, no asymmetry, masses, or scars and thyroid normal to palpation  RESP: lungs clear to auscultation - no rales, rhonchi or wheezes  CV: regular rate and rhythm, normal S1 S2, no S3 or S4, no murmur, click or rub, no peripheral edema and peripheral pulses strong  ABDOMEN: soft, nontender, no hepatosplenomegaly, no masses and bowel sounds normal  SKIN: no suspicious lesions or rashes  NEURO: Normal strength and tone, mentation intact and speech normal  PSYCH: mentation appears normal, affect normal/bright  LYMPH: no cervical adenopathy    Diagnostic Test Results:  Labs reviewed in Epic  none         Assessment & Plan     1. Essential hypertension with goal blood pressure less than 140/90  Well controlled, continue current management    2. Moderate recurrent major depression (H)  Starting Celexa.  I've explained to her that drugs of the SSRI class can have side effects such as weight gain, sexual dysfunction, insomnia, headache, nausea. These medications are generally effective at alleviating symptoms of anxiety and/or depression. Let me know if significant side effects do occur. Return to clinic 3 weeks for medication check  - citalopram (CELEXA) 10 MG tablet; Take 1 tablet (10 mg) by mouth  daily  Dispense: 30 tablet; Refill: 1    3. Other chest pain  Getting stress test as she had nondiagnostic stress echo when seen in the ER on 9/24/19.  - NM Lexiscan stress test; Future    4. Need for prophylactic vaccination and inoculation against influenza    - INFLUENZA VACCINE IM > 6 MONTHS VALENT IIV4 [16709]  - Vaccine Administration, Initial [90206]       Work on weight loss  Regular exercise  See Patient Instructions    Return in about 3 weeks (around 11/4/2019), or if symptoms worsen or fail to improve, for Routine Visit.    MELANIA Domingo [facility]

## 2019-10-28 NOTE — PROGRESS NOTES
Subjective     Mckayla Jeff is a 34 year old female who presents to clinic today for the following health issues:    HPI   ED/UC Followup:    Facility:  [facility]   Date of visit: 10/14/2019  Reason for visit: Stain of right hamstring   Current Status: doing okay     Patient is feeling better but continues to feel stiffness in her Hamstring region.  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     Patient Active Problem List   Diagnosis     Essential hypertension with goal blood pressure less than 140/90     CARDIOVASCULAR SCREENING; LDL GOAL LESS THAN 160     PCOS (polycystic ovarian syndrome)     Obesity, Class III, BMI 40-49.9 (morbid obesity) (H)     Synovitis of knee     Internal derangement of knee     Adjustment disorder with mixed anxiety and depressed mood     Health Care Home     Moderate recurrent major depression (H)     Other chest pain     Past Surgical History:   Procedure Laterality Date     NO HISTORY OF SURGERY         Social History     Tobacco Use     Smoking status: Never Smoker     Smokeless tobacco: Never Used   Substance Use Topics     Alcohol use: No     Family History   Problem Relation Age of Onset     Hypertension Mother      Obesity Mother      Depression Mother      Cancer - colorectal Father      Diabetes Maternal Aunt      Depression Maternal Aunt      Asthma No family hx of      Cancer No family hx of      Blood Disease No family hx of      Neurologic Disorder No family hx of      Eye Disorder No family hx of      Thyroid Disease No family hx of      Heart Disease No family hx of      Lipids No family hx of          Current Outpatient Medications   Medication Sig Dispense Refill     citalopram (CELEXA) 10 MG tablet Take 1 tablet (10 mg) by mouth daily 30 tablet 1     losartan-hydrochlorothiazide (HYZAAR) 100-25 MG tablet Take 1 tablet by mouth daily 90 tablet 3      "metFORMIN (GLUCOPHAGE) 500 MG tablet Take 1 tablet (500 mg) by mouth 2 times daily (with meals) 180 tablet 3     ondansetron (ZOFRAN ODT) 4 MG ODT tab Take 1-2 tablets (4-8 mg) by mouth every 8 hours as needed for nausea 20 tablet 1     potassium chloride ER (K-DUR/KLOR-CON M) 10 MEQ CR tablet Take 2 tablets (20 mEq) by mouth daily 180 tablet 3     BP Readings from Last 3 Encounters:   10/28/19 120/82   10/14/19 131/84   10/07/19 (!) 142/86    Wt Readings from Last 3 Encounters:   10/28/19 133.8 kg (295 lb)   10/14/19 136.3 kg (300 lb 6.4 oz)   10/07/19 137.9 kg (304 lb)           Reviewed and updated as needed this visit by Provider         Review of Systems   ROS COMP: Constitutional, HEENT, cardiovascular, pulmonary, gi and gu systems are negative, except as otherwise noted.      Objective    /82   Pulse 82   Temp 98  F (36.7  C)   Resp 14   Ht 1.778 m (5' 10\")   Wt 133.8 kg (295 lb)   SpO2 98%   BMI 42.33 kg/m    Body mass index is 42.33 kg/m .  Physical Exam   GENERAL: healthy, alert and no distress  NECK: no adenopathy, no asymmetry, masses, or scars and thyroid normal to palpation  RESP: lungs clear to auscultation - no rales, rhonchi or wheezes  CV: regular rate and rhythm, normal S1 S2, no S3 or S4, no murmur, click or rub, no peripheral edema and peripheral pulses strong  ABDOMEN: soft, nontender, no hepatosplenomegaly, no masses and bowel sounds normal  MS: no gross musculoskeletal defects noted, no edema  SKIN: no suspicious lesions or rashes  NEURO: Normal strength and tone, mentation intact and speech normal  BACK: no CVA tenderness, no paralumbar tenderness  PSYCH: mentation appears normal, affect normal/bright  LYMPH: no cervical adenopathy    Diagnostic Test Results:  Labs reviewed in Epic  none         Assessment & Plan     1. Sprain, hamstring, right, subsequent encounter  Follow with physical therapy as scheduled, continue with NSAID (Ibuprofen) as needed, new work letter given with " "same restrictions through 11/11/19.       BMI:   Estimated body mass index is 42.33 kg/m  as calculated from the following:    Height as of this encounter: 1.778 m (5' 10\").    Weight as of this encounter: 133.8 kg (295 lb).   Weight management plan: Discussed healthy diet and exercise guidelines        Work on weight loss  Regular exercise  See Patient Instructions    No follow-ups on file.    MELANIA Domingo [facility]      "

## 2019-10-28 NOTE — LETTER
[address] [address]  Phone: [phone]    October 28, 2019        Mckayla Jeff  [facility] [facility] [address]          To whom it may concern:    RE: Mckayla Jeff    Patient was seen and treated today at our clinic.  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    Please contact me for questions or concerns.      Sincerely,        MELANIA Domingo CNP

## 2019-10-28 NOTE — PATIENT INSTRUCTIONS
At [facility], we strive to deliver an exceptional experience to you, every time we see you. If you receive a survey, please complete it as we do value your feedback.  If you have MyChart, you can expect to receive results automatically within 24 hours of their completion.  Your provider will send a note interpreting your results as well.   If you do not have MyChart, you should receive your results in about a week by mail.    Your care team:                            Family Medicine Internal Medicine   MD Triston Vegas MD Shantel Branch-Fleming, MD Katya Georgiev PA-C Megan Hill, APRMARKO Polanco, MD Pediatrics   Gregor Brown, PAJose AlfredoC  Wendy Mccullough, MD Corazon Mena APRN CNP   MD Kristin Menendez MD Deborah Mielke, MD Kim Thein, APRN CNP      Clinic hours: Monday - Thursday 7 am-7 pm; Fridays 7 am-5 pm.   Urgent care: Monday - Friday 11 am-9 pm; Saturday and Sunday 9 am-5 pm.  Pharmacy : Monday -Thursday 8 am-8 pm; Friday 8 am-6 pm; Saturday and Sunday 9 am-5 pm.     Clinic: [phone]   Pharmacy: [phone]

## 2019-11-04 ENCOUNTER — TELEPHONE (OUTPATIENT)
Dept: FAMILY MEDICINE | Facility: CLINIC | Age: 34
End: 2019-11-04

## 2019-11-04 ENCOUNTER — OFFICE VISIT (OUTPATIENT)
Dept: FAMILY MEDICINE | Facility: CLINIC | Age: 34
End: 2019-11-04
Payer: COMMERCIAL

## 2019-11-04 VITALS
HEIGHT: 70 IN | SYSTOLIC BLOOD PRESSURE: 158 MMHG | TEMPERATURE: 98.2 F | HEART RATE: 72 BPM | DIASTOLIC BLOOD PRESSURE: 104 MMHG | RESPIRATION RATE: 16 BRPM | BODY MASS INDEX: 41.95 KG/M2 | WEIGHT: 293 LBS

## 2019-11-04 DIAGNOSIS — J18.9 PNEUMONIA OF LEFT LOWER LOBE DUE TO INFECTIOUS ORGANISM: ICD-10-CM

## 2019-11-04 DIAGNOSIS — E66.01 OBESITY, CLASS III, BMI 40-49.9 (MORBID OBESITY) (H): ICD-10-CM

## 2019-11-04 DIAGNOSIS — I10 ESSENTIAL HYPERTENSION WITH GOAL BLOOD PRESSURE LESS THAN 140/90: Primary | ICD-10-CM

## 2019-11-04 DIAGNOSIS — J01.90 ACUTE SINUSITIS WITH SYMPTOMS > 10 DAYS: Primary | ICD-10-CM

## 2019-11-04 DIAGNOSIS — I10 ESSENTIAL HYPERTENSION WITH GOAL BLOOD PRESSURE LESS THAN 140/90: ICD-10-CM

## 2019-11-04 PROCEDURE — 99214 OFFICE O/P EST MOD 30 MIN: CPT | Performed by: NURSE PRACTITIONER

## 2019-11-04 RX ORDER — DOXYCYCLINE 100 MG/1
100 CAPSULE ORAL 2 TIMES DAILY
COMMUNITY
Start: 2019-11-01 | End: 2020-01-17

## 2019-11-04 RX ORDER — LOSARTAN POTASSIUM 100 MG/1
100 TABLET ORAL DAILY
Qty: 90 TABLET | Refills: 0 | Status: SHIPPED | OUTPATIENT
Start: 2019-11-04 | End: 2020-03-17

## 2019-11-04 RX ORDER — HYDROCHLOROTHIAZIDE 25 MG/1
25 TABLET ORAL DAILY
Qty: 90 TABLET | Refills: 0 | Status: SHIPPED | OUTPATIENT
Start: 2019-11-04 | End: 2020-03-17

## 2019-11-04 RX ORDER — LOSARTAN POTASSIUM AND HYDROCHLOROTHIAZIDE 25; 100 MG/1; MG/1
1 TABLET ORAL DAILY
Refills: 3 | COMMUNITY
Start: 2019-10-04 | End: 2020-06-11

## 2019-11-04 ASSESSMENT — MIFFLIN-ST. JEOR: SCORE: 2108.83

## 2019-11-04 NOTE — TELEPHONE ENCOUNTER
Received faxed request from [facility]. . [phone] Fax [phone]    Medication: losartan/hctz 100-25 mg tab    Medication is on backorder. Please send for new RX for losartan and hydrochlorothiazide.

## 2019-11-04 NOTE — TELEPHONE ENCOUNTER
Medication: losartan/hctz 100-25 mg tab     Medication is on backorder. Please send for new RX for losartan and hydrochlorothiazide.     Rx cued. Route to PCP to review and sign.     Deepali Freed RN, BSN, PHN  [facility] [place]

## 2019-11-04 NOTE — LETTER
[address] [address]  Phone: [phone]    November 4, 2019        Mckayla Jeff  [facility] [facility] [address]          To whom it may concern:    RE: Mckayla Jeff    Patient was seen and treated today at our clinic and missed work. She can return to work on  Monday11/11/19    Please contact me for questions or concerns.      Sincerely,        MELANIA Domingo CNP

## 2019-11-05 NOTE — PROGRESS NOTES
Subjective     Mckayla Jeff is a 34 year old female who presents to clinic today for the following health issues:    HPI   ED/UC Followup:    Facility:  [facility]  Date of visit: 11/1/19  Reason for visit: pain in head  Current Status: ok[     She was also diagnosed with pneumonia and continues on Doxycycline/toleratign well.    Imaging:  XR Chest (2 views) PA and Lateral:  IMPRESSION: There is minimal patchy opacity at the extreme left lung base, only  seen on the frontal view. This may reflect some atelectasis or chronic scarring  in this location, although correlate the left likely possibility of early  infiltrate in this location. Remainder of lungs clear. Normal heart size.  Degenerative changes within the lower thoracic spine.  Report per radiology.     CT Head Brain (non-contrast):  IMPRESSION:  1.  The brain is unremarkable.  2.  Paranasal sinusitis.  3.  Moderate adenoid hypertrophy.  Results per radiology.        Patient Active Problem List   Diagnosis     Essential hypertension with goal blood pressure less than 140/90     CARDIOVASCULAR SCREENING; LDL GOAL LESS THAN 160     PCOS (polycystic ovarian syndrome)     Obesity, Class III, BMI 40-49.9 (morbid obesity) (H)     Synovitis of knee     Internal derangement of knee     Adjustment disorder with mixed anxiety and depressed mood     Health Care Home     Moderate recurrent major depression (H)     Other chest pain     Past Surgical History:   Procedure Laterality Date     NO HISTORY OF SURGERY         Social History     Tobacco Use     Smoking status: Never Smoker     Smokeless tobacco: Never Used   Substance Use Topics     Alcohol use: No     Family History   Problem Relation Age of Onset     Hypertension Mother      Obesity Mother      Depression Mother      Cancer - colorectal Father      Diabetes Maternal Aunt      Depression Maternal Aunt      Asthma No family hx of      Cancer No family hx of      Blood Disease No family hx of       "Neurologic Disorder No family hx of      Eye Disorder No family hx of      Thyroid Disease No family hx of      Heart Disease No family hx of      Lipids No family hx of          Current Outpatient Medications   Medication Sig Dispense Refill     citalopram (CELEXA) 10 MG tablet Take 1 tablet (10 mg) by mouth daily 30 tablet 1     doxycycline hyclate (VIBRAMYCIN) 100 MG capsule Take 100 mg by mouth 2 times daily       losartan-hydrochlorothiazide (HYZAAR) 100-25 MG tablet Take 1 tablet by mouth daily  3     metFORMIN (GLUCOPHAGE) 500 MG tablet Take 1 tablet (500 mg) by mouth 2 times daily (with meals) 180 tablet 3     ondansetron (ZOFRAN ODT) 4 MG ODT tab Take 1-2 tablets (4-8 mg) by mouth every 8 hours as needed for nausea 20 tablet 1     potassium chloride ER (K-DUR/KLOR-CON M) 10 MEQ CR tablet Take 2 tablets (20 mEq) by mouth daily 180 tablet 3     hydrochlorothiazide (HYDRODIURIL) 25 MG tablet Take 1 tablet (25 mg) by mouth daily 90 tablet 0     losartan (COZAAR) 100 MG tablet Take 1 tablet (100 mg) by mouth daily 90 tablet 0     BP Readings from Last 3 Encounters:   11/04/19 (!) 158/104   10/28/19 120/82   10/14/19 131/84    Wt Readings from Last 3 Encounters:   11/04/19 133.4 kg (294 lb)   10/28/19 133.8 kg (295 lb)   10/14/19 136.3 kg (300 lb 6.4 oz)         Reviewed and updated as needed this visit by Provider  Tobacco         Review of Systems   ROS COMP: Constitutional, HEENT, cardiovascular, pulmonary, gi and gu systems are negative, except as otherwise noted.      Objective    BP (!) 158/104   Pulse 72   Temp 98.2  F (36.8  C)   Resp 16   Ht 1.778 m (5' 10\")   Wt 133.4 kg (294 lb)   BMI 42.18 kg/m    Body mass index is 42.18 kg/m .  Physical Exam   GENERAL: healthy, alert and no distress  EYES: Eyes grossly normal to inspection, PERRL and conjunctivae and sclerae normal  HENT: ear canals and TM's normal, nose and mouth without ulcers or lesions  NECK: no adenopathy, no asymmetry, masses, or scars " and thyroid normal to palpation  RESP: lungs clear to auscultation - no rales, rhonchi or wheezes  CV: regular rate and rhythm, normal S1 S2, no S3 or S4, no murmur, click or rub, no peripheral edema and peripheral pulses strong  ABDOMEN: soft, nontender, no hepatosplenomegaly, no masses and bowel sounds normal  MS: no gross musculoskeletal defects noted, no edema  SKIN: no suspicious lesions or rashes  NEURO: Normal strength and tone, mentation intact and speech normal  BACK: no CVA tenderness, no paralumbar tenderness  PSYCH: mentation appears normal, affect normal/bright  LYMPH: normal ant/post cervical, supraclavicular nodes    Diagnostic Test Results:  Labs reviewed in Epic  none         Assessment & Plan     1. Acute sinusitis with symptoms > 10 days  Continue with doxycycline s previously prescribed.  ACUTE SINUSITIS  Wediscussed the pathophysiology of sinus pressure/sinusitis and treatment options with emphasis on healthy lifestyle and opening up natural drainage passages.   We discussed that in only 0.5% to 2% of the time, sinusitis is complicated by a bacterial infection requiring antibiotics.   Of those patients who have bacterial sinusitis, 40-60% will clear the infection on their own spontaneously,discussed the risks and benefits of various over the counter and prescription treatments including short courses of decongestant nasal sprays.  Symptoms will typically resolve after 7-10 days. Mckaylalorelei Jeff is to continue to push fluids, saline lavage, OTC Tylenol or Ibuprofen for headache/sinus pain and return to clinic iIf new, worsening or persistent symptoms.    2. Pneumonia of left lower lobe due to infectious organism (H)  Finish Doxycycline as previously prescribed, advised use of humidified air at HS, return to clinic if not improved, new, or worsening symptoms.     3. Obesity, Class III, BMI 40-49.9 (morbid obesity) (H)  Benefits of weight loss reviewed in detail, encouraged her to cut back on  "the carbohydrates in the diet, consume more fruits and vegetables, drink plenty of water, avoid fruit juices, sodas, get 150 min moderate exercise/week.  Recheck weight in 6 months.      4. Essential hypertension with goal blood pressure less than 140/90  BP remains elevated but patient has not taken her medication today. Stressed medication compliance, low salt diet, weight loss. Reviewed complications of poorly/uncontrolled HYPERTENSION including increased risk for stroke, heart attack, kidney disease, among others.     BMI:   Estimated body mass index is 42.18 kg/m  as calculated from the following:    Height as of this encounter: 1.778 m (5' 10\").    Weight as of this encounter: 133.4 kg (294 lb).   Weight management plan: Discussed healthy diet and exercise guidelines        Work on weight loss  Regular exercise  See Patient Instructions    Return in about 4 weeks (around 12/2/2019), or if symptoms worsen or fail to improve, for Routine Visit.    MELANIA Domingo [facility]      "

## 2019-11-05 NOTE — PATIENT INSTRUCTIONS
At [facility], we strive to deliver an exceptional experience to you, every time we see you. If you receive a survey, please complete it as we do value your feedback.  If you have MyChart, you can expect to receive results automatically within 24 hours of their completion.  Your provider will send a note interpreting your results as well.   If you do not have MyChart, you should receive your results in about a week by mail.    Your care team:                            Family Medicine Internal Medicine   MD Triston Vegas MD Shantel Branch-Fleming, MD Katya Georgiev PA-C Megan Hill, APRN CNP    James Polanco, MD Pediatrics   Gregor Brown, PAJose AlfredoC  Wendy Mccullough, MD Corazon Mena APRN CNP   MD Kristin Menendez MD Deborah Mielke, MD Kim Thein, APRN [facility]      Clinic hours: Monday - Thursday 7 am-7 pm; Fridays 7 am-5 pm.   Urgent care: Monday - Friday 11 am-9 pm; Saturday and Sunday 9 am-5 pm.  Pharmacy : Monday -Thursday 8 am-8 pm; Friday 8 am-6 pm; Saturday and Sunday 9 am-5 pm.     Clinic: [phone]   Pharmacy: [phone]        Patient Education     Acute Sinusitis    Acute sinusitis is irritation and swelling of the sinuses. It is usually caused by a viral infection after a common cold. Your doctor can help you find relief.  What is acute sinusitis?  Sinuses are air-filled spaces in the skull behind the face. They are kept moist and clean by a lining of mucosa. Things such as pollen, smoke, and chemical fumes can irritate the mucosa. It can then swell up. As a response to irritation, the mucosa makes more mucus and other fluids. Tiny hairlike cilia cover the mucosa. Cilia help carry mucus toward the opening of the sinus. Too much mucus may cause the cilia to stop working. This blocks the sinus opening. A buildup of fluid in the sinuses then causes pain and pressure. It can also encourage bacteria to grow in the  sinuses.  Common symptoms of acute sinusitis  You may have:    Facial soreness pain    Headache    Fever    Fluid draining in the back of the throat (postnasal drip)    Congestion    Drainage that is thick and colored, instead of clear    Cough  Diagnosing acute sinusitis  Your doctor will ask about your symptoms and health history. He or she will look at your ear, nose, and throat. You usually won't need to have X-rays taken.    The doctor may take a sample of mucus to check for bacteria. If you have sinusitis that keeps coming back, you may need imaging tests such as X-rays or CAT scans. This will help your doctor check for a structural problem that may be causing the infection.  Treating acute sinusitis  Treatment is aimed at unblocking the sinus opening and helping the cilia work again. You may need to take antihistamine and decongestant medicine. These can reduce inflammation and decrease the amount of fluid your sinuses make. If you have a bacterial infection, you will need to take antibiotic medicine for 10 to 14 days. Take this medicine until it is gone, even if you feel better.  Date Last Reviewed: 10/1/2016    3366-1116 [employer]. [address] [place] [address]. All rights reserved. This information is not intended as a substitute for professional medical care. Always follow your healthcare professional's instructions.           Patient Education     Treating Pneumonia  Pneumonia is an infection of one or both of the lungs. Pneumonia:    Is usually caused by either a virus or a bacteria    Can be very serious, especially in infants, young children, and older adults. It s also serious for those with other long-term health problems or weakened immune systems.    Is sometimes treated at home and sometimes in the hospital    Antibiotic medicines  Antibiotics may be prescribed for pneumonia caused by bacteria. They may be pills (oral medicines), or shots (injections). Or they may be  given by IV (intravenously) into a vein. If you are taking oral medicines at home:    Fill your prescription and start taking your medicine as soon as you can.    You will likely start to feel better in a day or 2, but don t stop taking the antibiotic.    Use a pill organizer to help you remember to take your medicine.    Let your healthcare provider know if you have side effects.    Take your medicine exactly as directed on the label. Talk to your provider or pharmacist if you have any questions.  Antiviral medicines  Antiviral medicine may be prescribed for pneumonia caused by a virus. For example, antiviral medicine may be prescribed for pneumonia caused by the flu virus. Antibiotics do not work against viruses. If you are taking antiviral medicine at home:    Fill your prescription and start taking your medicine as soon as you can.    Talk with your provider or pharmacist about possible side effects.    Take the medicine exactly as instructed.  To relieve symptoms  There are many medicines that can help relieve symptoms of pneumonia. Some are prescription and some are over-the-counter.  Your healthcare provider may recommend:    Acetaminophen or ibuprofen to lower your fever and to lessen headache or other pain    Cough medicine to loosen mucus or to reduce coughing  Make sure you check with your healthcare provider or pharmacist before taking any over-the-counter medicines.  Special treatments  If you are hospitalized for pneumonia, you may have other therapies, including:    Inhaled medicines to help with breathing or chest congestion    Supplemental oxygen to increase low oxygen levels  Drink fluids and eat healthy  You should eat healthy to help your body fight the infection. Drinking a lot of fluids helps to replace fluids lost from fever and to loosen mucus in your chest.    Diet. Make healthy food choices, including fruits and vegetables, lean meats and other proteins, 100% whole grain and low- or no-fat  dairy products.    Fluids. Drink at least 6 to 8 tall glasses a day. Water and 100% fruit or vegetable juice are best.  Get plenty of rest and sleep  You may be more tired than usual for a while. It is important to get enough sleep at night. It s also important to rest during the day. Talk with your healthcare provider if coughing or other symptoms are interfering with your sleep.  Preventing the spread of germs  The best thing you can do to prevent spreading germs is to wash your hands often. You should:    Rub your hand with soap and water for 20 to 30 seconds.    Clean in between your fingers, the backs of your hands, and around your nails.    Dry your hands on a separate towel or use paper towels.  You should also:    Keep alcohol-based hand  nearby.    Make sure you also clean surfaces that you touch. Use a product that kills all types of germs.    Stay away from others until you are feeling better.  When to call your healthcare provider  Call your healthcare provider if you have any of the following:    Symptoms get worse    Fever continues    Shortness of breath gets worse    Increased mucus or mucus that is darker in color    Coughing gets worse    Lips or fingers are bluish in color    Side effects from your medicine   Date Last Reviewed: 12/1/2016 2000-2018 [employer]. [address] [place] [address], [place] [address]. All rights reserved. This information is not intended as a substitute for professional medical care. Always follow your healthcare professional's instructions.

## 2019-11-06 ENCOUNTER — HEALTH MAINTENANCE LETTER (OUTPATIENT)
Age: 34
End: 2019-11-06

## 2019-11-08 ENCOUNTER — THERAPY VISIT (OUTPATIENT)
Dept: PHYSICAL THERAPY | Facility: CLINIC | Age: 34
End: 2019-11-08
Payer: OTHER MISCELLANEOUS

## 2019-11-08 DIAGNOSIS — S76.311A STRAIN OF RIGHT HAMSTRING: ICD-10-CM

## 2019-11-08 PROCEDURE — 97110 THERAPEUTIC EXERCISES: CPT | Mod: GP | Performed by: PHYSICAL THERAPIST

## 2019-11-08 PROCEDURE — 97140 MANUAL THERAPY 1/> REGIONS: CPT | Mod: GP | Performed by: PHYSICAL THERAPIST

## 2019-11-08 PROCEDURE — 97161 PT EVAL LOW COMPLEX 20 MIN: CPT | Mod: GP | Performed by: PHYSICAL THERAPIST

## 2019-11-08 NOTE — PROGRESS NOTES
"[facility] Initial Evaluation  Subjective:  The history is provided by the patient.   Affected Side: R hamstring.   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  DOI 10/7/2019..   Site of Pain: R hamstring.  Associated symptoms:  Loss of motion/stiffness and loss of strength. Symptoms are exacerbated by bending/squatting (driving) and relieved by ice.      and reported as 4/10 on pain scale. General health as reported by patient is fair. Pertinent medical history includes:  Depression, high blood pressure, migraines/headaches and overweight.    Surgeries include:  None.  Current medications:  Anti-depressants and high blood pressure medication.   Primary job tasks include:  Driving, lifting/carrying and pushing/pulling.  Pain is described as aching and is intermittent. Pain is worse in the P.M.. Since onset symptoms are gradually improving.      Occupation: [employer]. Restrictions include:  Working in normal job with restrictions.    Barriers include:  None as reported by patient.  Red flags:  None as reported by patient.                      Objective:    Gait:    Gait Type:  Normal                                                           Knee Evaluation:  ROM:  AROM: normal (HS tightness on R)            Strength:     Extension:  Left: 5/5   Pain:      Right: 5/5   Pain:  Flexion:  Left: 5/5   Pain:      Right: 4+/5    Pain:+        Ligament Testing:  Normal                  Palpation:      Right knee tenderness present at:  Semitendinosus  Edema:  Normal            General     ROS    Assessment/Plan:    Patient is a 34 year old female with R hamstring complaints.    Patient has the following significant findings with corresponding treatment plan.                Diagnosis 1:  R HS strain  Pain -  manual therapy, self management, education, directional preference exercise and home program  Decreased ROM/flexibility - " manual therapy and therapeutic exercise  Decreased strength - therapeutic exercise and therapeutic activities  Decreased function - therapeutic activities    Therapy Evaluation Codes:   1) History comprised of:   Personal factors that impact the plan of care:      None.    Comorbidity factors that impact the plan of care are:      Depression, High blood pressure, Migraines/headaches and Overweight.     Medications impacting care: Anti-depressant and High blood pressure.  2) Examination of Body Systems comprised of:   Body structures and functions that impact the plan of care:      R hamstring.   Activity limitations that impact the plan of care are:      Bending and Driving.  3) Clinical presentation characteristics are:   Stable/Uncomplicated.  4) Decision-Making    Low complexity using standardized patient assessment instrument and/or measureable assessment of functional outcome.  Cumulative Therapy Evaluation is: Low complexity.    Previous and current functional limitations:  (See Goal Flow Sheet for this information)    Short term and Long term goals: (See Goal Flow Sheet for this information)     Communication ability:  Patient appears to be able to clearly communicate and understand verbal and written communication and follow directions correctly.  Treatment Explanation - The following has been discussed with the patient:   RX ordered/plan of care  Anticipated outcomes  Possible risks and side effects  This patient would benefit from PT intervention to resume normal activities.   Rehab potential is good.    Frequency:  1 X week, once daily  Duration:  for 6 weeks  Discharge Plan:  Achieve all LTG.  Independent in home treatment program.  Reach maximal therapeutic benefit.    Please refer to the daily flowsheet for treatment today, total treatment time and time spent performing 1:1 timed codes.

## 2019-11-11 ENCOUNTER — OFFICE VISIT (OUTPATIENT)
Dept: FAMILY MEDICINE | Facility: CLINIC | Age: 34
End: 2019-11-11
Payer: OTHER MISCELLANEOUS

## 2019-11-11 VITALS
HEART RATE: 83 BPM | WEIGHT: 293 LBS | OXYGEN SATURATION: 97 % | HEIGHT: 70 IN | SYSTOLIC BLOOD PRESSURE: 126 MMHG | RESPIRATION RATE: 16 BRPM | BODY MASS INDEX: 41.95 KG/M2 | TEMPERATURE: 97.7 F | DIASTOLIC BLOOD PRESSURE: 80 MMHG

## 2019-11-11 DIAGNOSIS — E66.01 OBESITY, CLASS III, BMI 40-49.9 (MORBID OBESITY) (H): ICD-10-CM

## 2019-11-11 DIAGNOSIS — S76.311A HAMSTRING MUSCLE STRAIN, RIGHT, INITIAL ENCOUNTER: Primary | ICD-10-CM

## 2019-11-11 DIAGNOSIS — F33.1 MODERATE RECURRENT MAJOR DEPRESSION (H): ICD-10-CM

## 2019-11-11 PROCEDURE — 99214 OFFICE O/P EST MOD 30 MIN: CPT | Performed by: NURSE PRACTITIONER

## 2019-11-11 ASSESSMENT — MIFFLIN-ST. JEOR: SCORE: 2108.83

## 2019-11-11 ASSESSMENT — PAIN SCALES - GENERAL: PAINLEVEL: NO PAIN (0)

## 2019-11-11 NOTE — LETTER
My Depression Action Plan  Name: Mckayla Jeff   Date of Birth 1985  Date: 11/11/2019    My doctor: Urmila Dillon   My clinic: [address] [id number] [phone]          GREEN    ZONE   Good Control    What it looks like:     Things are going generally well. You have normal up s and down s. You may even feel depressed from time to time, but bad moods usually last less than a day.   What you need to do:  1. Continue to care for yourself (see self care plan)  2. Check your depression survival kit and update it as needed  3. Follow your physician s recommendations including any medication.  4. Do not stop taking medication unless you consult with your physician first.           YELLOW         ZONE Getting Worse    What it looks like:     Depression is starting to interfere with your life.     It may be hard to get out of bed; you may be starting to isolate yourself from others.    Symptoms of depression are starting to last most all day and this has happened for several days.     You may have suicidal thoughts but they are not constant.   What you need to do:     1. Call your care team, your response to treatment will improve if you keep your care team informed of your progress. Yellow periods are signs an adjustment may need to be made.     2. Continue your self-care, even if you have to fake it!    3. Talk to someone in your support network    4. Open up your depression survival kit           RED    ZONE Medical Alert - Get Help    What it looks like:     Depression is seriously interfering with your life.     You may experience these or other symptoms: You can t get out of bed most days, can t work or engage in other necessary activities, you have trouble taking care of basic hygiene, or basic responsibilities, thoughts of suicide or death that will not go away, self-injurious behavior.     What you need to do:  1. Call your  care team and request a same-day appointment. If they are not available (weekends or after hours) call your local crisis line, emergency room or 911.            Depression Self Care Plan / Survival Kit    Self-Care for Depression  Here s the deal. Your body and mind are really not as separate as most people think.  What you do and think affects how you feel and how you feel influences what you do and think. This means if you do things that people who feel good do, it will help you feel better.  Sometimes this is all it takes.  There is also a place for medication and therapy depending on how severe your depression is, so be sure to consult with your medical provider and/ or Behavioral Health Consultant if your symptoms are worsening or not improving.     In order to better manage my stress, I will:    Exercise  Get some form of exercise, every day. This will help reduce pain and release endorphins, the  feel good  chemicals in your brain. This is almost as good as taking antidepressants!  This is not the same as joining a gym and then never going! (they count on that by the way ) It can be as simple as just going for a walk or doing some gardening, anything that will get you moving.      Hygiene   Maintain good hygiene (Get out of bed in the morning, Make your bed, Brush your teeth, Take a shower, and Get dressed like you were going to work, even if you are unemployed).  If your clothes don't fit try to get ones that do.    Diet  I will strive to eat foods that are good for me, drink plenty of water, and avoid excessive sugar, caffeine, alcohol, and other mood-altering substances.  Some foods that are helpful in depression are: complex carbohydrates, B vitamins, flaxseed, fish or fish oil, fresh fruits and vegetables.    Psychotherapy  I agree to participate in Individual Therapy (if recommended).    Medication  If prescribed medications, I agree to take them.  Missing doses can result in serious side effects.  I  understand that drinking alcohol, or other illicit drug use, may cause potential side effects.  I will not stop my medication abruptly without first discussing it with my provider.    Staying Connected With Others  I will stay in touch with my friends, family members, and my primary care provider/team.    Use your imagination  Be creative.  We all have a creative side; it doesn t matter if it s oil painting, sand castles, or mud pies! This will also kick up the endorphins.    Witness Beauty  (AKA stop and smell the roses) Take a look outside, even in mid-winter. Notice colors, textures. Watch the squirrels and birds.     Service to others  Be of service to others.  There is always someone else in need.  By helping others we can  get out of ourselves  and remember the really important things.  This also provides opportunities for practicing all the other parts of the program.    Humor  Laugh and be silly!  Adjust your TV habits for less news and crime-drama and more comedy.    Control your stress  Try breathing deep, massage therapy, biofeedback, and meditation. Find time to relax each day.     My support system    Clinic Contact:  Phone number:    Contact 1:  Phone number:    Contact 2:  Phone number:    [place]:  Phone number:    Therapist:  Phone number:    Local crisis center:    Phone number:    Other community support:  Phone number:       Breath sounds clear and equal bilaterally.

## 2019-11-11 NOTE — PATIENT INSTRUCTIONS
At [facility], we strive to deliver an exceptional experience to you, every time we see you.  If you receive a survey in the mail, please send us back your thoughts. We really do value your feedback.    Based on your medical history, these are the current health maintenance/preventive care services that you are due for (some may have been done at this visit.)  Health Maintenance Due   Topic Date Due     DEPRESSION ACTION PLAN  1985     PREVENTIVE CARE VISIT  04/11/2018         Suggested websites for health information:  Www.[employer].org : Up to date and easily searchable information on multiple topics.  Www.[employer].gov : medication info, interactive tutorials, watch real surgeries online  Www.familydoctor.org : good info from the Academy of Family Physicians  Www.cdc.gov : public health info, travel advisories, epidemics (H1N1)  Www.aap.org : children's health info, normal development, vaccinations  Www.health[facility].us : [place] dept of health, public health issues in [place], N1N1    Your care team:                            Family Medicine Internal Medicine   MD Triston Vegas MD Shantel Branch-Fleming, MD Katya Georgiev PA-C Nam Ho, MD Pediatrics   BERT Romero, MD Kristin Cooney CNP, MD Deborah Mielke, MD Kim Thein, APRN [facility]      Clinic hours: Monday - Thursday 7 am-7 pm; Fridays 7 am-5 pm.   Urgent care: Monday - Friday 11 am-9 pm; Saturday and Sunday 9 am-5 pm.  Pharmacy : Monday -Thursday 8 am-8 pm; Friday 8 am-6 pm; Saturday and Sunday 9 am-5 pm.     Clinic: [phone]   Pharmacy: [phone]

## 2019-11-11 NOTE — PROGRESS NOTES
Subjective     Mckayla Jeff is a 34 year old female who presents to clinic today for the following health issues:    HPI   Concern - Pulled Hamstring  Onset: 10/07/2019    Description:   Patient pull a hamstring while working on 10/07/2019. Patient states that she is no longer in pain but her physical therapist wants her to stay on current work restrictions for another month..    Intensity: mild    Progression of Symptoms:  improving    Therapies Tried and outcome: Tylenol and Ibuprofen  Depression Followup    How are you doing with your depression since your last visit? No change    Are you having other symptoms that might be associated with depression? No    Have you had a significant life event?  No     Are you feeling anxious or having panic attacks?   No    Do you have any concerns with your use of alcohol or other drugs? No    Social History     Tobacco Use     Smoking status: Never Smoker     Smokeless tobacco: Never Used   Substance Use Topics     Alcohol use: No     Drug use: No     PHQ 11/3/2015 3/4/2019 9/13/2019   PHQ-9 Total Score 15 16 19   Q9: Thoughts of better off dead/self-harm past 2 weeks Several days Not at all Not at all     PRASAD-7 SCORE 4/29/2015 10/14/2015 11/3/2015   Total Score 13 - -   Total Score - 14 12     Last PHQ-9 9/13/2019   1.  Little interest or pleasure in doing things 3   2.  Feeling down, depressed, or hopeless 2   3.  Trouble falling or staying asleep, or sleeping too much 3   4.  Feeling tired or having little energy 3   5.  Poor appetite or overeating 3   6.  Feeling bad about yourself 1   7.  Trouble concentrating 3   8.  Moving slowly or restless 1   Q9: Thoughts of better off dead/self-harm past 2 weeks 0   PHQ-9 Total Score 19   Difficulty at work, home, or with people Very difficult     PRASAD-7  11/3/2015   1. Feeling nervous, anxious, or on edge 1   2. Not being able to stop or control worrying 3   3. Worrying too much about different things 2   4. Trouble relaxing  2   5. Being so restless that it is hard to sit still 2   6. Becoming easily annoyed or irritable 2   7. Feeling afraid, as if something awful might happen 0   PRASAD-7 Total Score 12   If you checked any problems, how difficult have they made it for you to do your work, take care of things at home, or get along with other people? -     In the past two weeks have you had thoughts of suicide or self-harm?  No.    Do you have concerns about your personal safety or the safety of others?   No    Suicide Assessment Five-step Evaluation and Treatment (SAFE-T)    Patient Active Problem List   Diagnosis     Essential hypertension with goal blood pressure less than 140/90     CARDIOVASCULAR SCREENING; LDL GOAL LESS THAN 160     PCOS (polycystic ovarian syndrome)     Obesity, Class III, BMI 40-49.9 (morbid obesity) (H)     Synovitis of knee     Internal derangement of knee     Adjustment disorder with mixed anxiety and depressed mood     Health Care Home     Moderate recurrent major depression (H)     Other chest pain     Strain of right hamstring     Past Surgical History:   Procedure Laterality Date     NO HISTORY OF SURGERY         Social History     Tobacco Use     Smoking status: Never Smoker     Smokeless tobacco: Never Used   Substance Use Topics     Alcohol use: No     Family History   Problem Relation Age of Onset     Hypertension Mother      Obesity Mother      Depression Mother      Cancer - colorectal Father      Diabetes Maternal Aunt      Depression Maternal Aunt      Asthma No family hx of      Cancer No family hx of      Blood Disease No family hx of      Neurologic Disorder No family hx of      Eye Disorder No family hx of      Thyroid Disease No family hx of      Heart Disease No family hx of      Lipids No family hx of          Current Outpatient Medications   Medication Sig Dispense Refill     citalopram (CELEXA) 10 MG tablet Take 1 tablet (10 mg) by mouth daily 30 tablet 1     doxycycline hyclate  "(VIBRAMYCIN) 100 MG capsule Take 100 mg by mouth 2 times daily       hydrochlorothiazide (HYDRODIURIL) 25 MG tablet Take 1 tablet (25 mg) by mouth daily 90 tablet 0     losartan (COZAAR) 100 MG tablet Take 1 tablet (100 mg) by mouth daily 90 tablet 0     losartan-hydrochlorothiazide (HYZAAR) 100-25 MG tablet Take 1 tablet by mouth daily  3     metFORMIN (GLUCOPHAGE) 500 MG tablet Take 1 tablet (500 mg) by mouth 2 times daily (with meals) 180 tablet 3     potassium chloride ER (K-DUR/KLOR-CON M) 10 MEQ CR tablet Take 2 tablets (20 mEq) by mouth daily 180 tablet 3     ondansetron (ZOFRAN ODT) 4 MG ODT tab Take 1-2 tablets (4-8 mg) by mouth every 8 hours as needed for nausea (Patient not taking: Reported on 11/11/2019) 20 tablet 1     BP Readings from Last 3 Encounters:   11/11/19 126/80   11/04/19 (!) 158/104   10/28/19 120/82    Wt Readings from Last 3 Encounters:   11/11/19 133.4 kg (294 lb)   11/04/19 133.4 kg (294 lb)   10/28/19 133.8 kg (295 lb)                 Reviewed and updated as needed this visit by Provider         Review of Systems   ROS COMP: Constitutional, HEENT, cardiovascular, pulmonary, gi and gu systems are negative, except as otherwise noted.      Objective    /80 (BP Location: Left arm, Patient Position: Sitting, Cuff Size: Adult Large)   Pulse 83   Temp 97.7  F (36.5  C) (Oral)   Resp 16   Ht 1.778 m (5' 10\")   Wt 133.4 kg (294 lb)   LMP 11/02/2019 (Approximate)   SpO2 97%   Breastfeeding? No   BMI 42.18 kg/m    Body mass index is 42.18 kg/m .  Physical Exam   GENERAL: healthy, alert and no distress  EYES: Eyes grossly normal to inspection, PERRL and conjunctivae and sclerae normal  HENT: ear canals and TM's normal, nose and mouth without ulcers or lesions  NECK: no adenopathy, no asymmetry, masses, or scars and thyroid normal to palpation  RESP: lungs clear to auscultation - no rales, rhonchi or wheezes  CV: regular rate and rhythm, normal S1 S2, no S3 or S4, no murmur, click or " "rub, no peripheral edema and peripheral pulses strong  ABDOMEN: soft, nontender, no hepatosplenomegaly, no masses and bowel sounds normal  MS: no gross musculoskeletal defects noted, no edema  SKIN: no suspicious lesions or rashes  NEURO: Normal strength and tone, mentation intact and speech normal  BACK: no CVA tenderness, no paralumbar tenderness  PSYCH: mentation appears normal, affect normal/bright  LYMPH: normal ant/post cervical, supraclavicular nodes    Diagnostic Test Results:  Labs reviewed in Epic  none         Assessment & Plan     1. Hamstring muscle strain, right, initial encounter  Extended work restrictions for another month.  Follow back if not improved     2. Obesity, Class III, BMI 40-49.9 (morbid obesity) (H)  Benefits of weight loss reviewed in detail, encouraged her to cut back on the carbohydrates in the diet, consume more fruits and vegetables, drink plenty of water, avoid fruit juices, sodas, get 150 min moderate exercise/week.  Recheck weight in 6 months.      3. Moderate recurrent major depression (H)  DAP reviewed.  She is feeling better on Celexa (started 10/14/10. Continue current management for now.  - DEPRESSION ACTION PLAN (DAP)     BMI:   Estimated body mass index is 42.18 kg/m  as calculated from the following:    Height as of this encounter: 1.778 m (5' 10\").    Weight as of this encounter: 133.4 kg (294 lb).   Weight management plan: Discussed healthy diet and exercise guidelines        Work on weight loss  Regular exercise  See Patient Instructions    Return in about 4 weeks (around 12/9/2019), or if symptoms worsen or fail to improve, for Routine Visit.    MELANIA Domingo [facility]      "

## 2019-11-11 NOTE — LETTER
[address] [id number]  Phone: [phone]    November 11, 2019        Mckayla Jeff  [address] [place] [id number]          To whom it may concern:    RE: Mckayla Enciso was seen and treated today at our clinic..  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     Please contact me for questions or concerns.      Sincerely,        MELANIA Domingo CNP

## 2019-11-15 ENCOUNTER — THERAPY VISIT (OUTPATIENT)
Dept: PHYSICAL THERAPY | Facility: CLINIC | Age: 34
End: 2019-11-15
Payer: OTHER MISCELLANEOUS

## 2019-11-15 DIAGNOSIS — S76.311D STRAIN OF RIGHT HAMSTRING MUSCLE, SUBSEQUENT ENCOUNTER: ICD-10-CM

## 2019-11-15 DIAGNOSIS — S76.311A STRAIN OF RIGHT HAMSTRING: ICD-10-CM

## 2019-11-15 PROCEDURE — 97110 THERAPEUTIC EXERCISES: CPT | Mod: GP | Performed by: PHYSICAL THERAPIST

## 2019-11-15 PROCEDURE — 97140 MANUAL THERAPY 1/> REGIONS: CPT | Mod: GP | Performed by: PHYSICAL THERAPIST

## 2019-11-18 ENCOUNTER — OFFICE VISIT (OUTPATIENT)
Dept: URGENT CARE | Facility: URGENT CARE | Age: 34
End: 2019-11-18
Payer: COMMERCIAL

## 2019-11-18 VITALS
OXYGEN SATURATION: 100 % | RESPIRATION RATE: 18 BRPM | TEMPERATURE: 98 F | HEART RATE: 84 BPM | BODY MASS INDEX: 42.1 KG/M2 | WEIGHT: 293 LBS | SYSTOLIC BLOOD PRESSURE: 130 MMHG | DIASTOLIC BLOOD PRESSURE: 88 MMHG

## 2019-11-18 DIAGNOSIS — J06.9 UPPER RESPIRATORY TRACT INFECTION, UNSPECIFIED TYPE: ICD-10-CM

## 2019-11-18 DIAGNOSIS — J02.9 SORE THROAT: Primary | ICD-10-CM

## 2019-11-18 LAB
DEPRECATED S PYO AG THROAT QL EIA: NORMAL
SPECIMEN SOURCE: NORMAL

## 2019-11-18 PROCEDURE — 99213 OFFICE O/P EST LOW 20 MIN: CPT | Performed by: PHYSICIAN ASSISTANT

## 2019-11-18 PROCEDURE — 87081 CULTURE SCREEN ONLY: CPT | Performed by: PHYSICIAN ASSISTANT

## 2019-11-18 PROCEDURE — 87880 STREP A ASSAY W/OPTIC: CPT | Performed by: PHYSICIAN ASSISTANT

## 2019-11-18 ASSESSMENT — ENCOUNTER SYMPTOMS
APPETITE CHANGE: 1
CARDIOVASCULAR NEGATIVE: 1
MYALGIAS: 1
GASTROINTESTINAL NEGATIVE: 1
FEVER: 0
SORE THROAT: 1
ACTIVITY CHANGE: 1
COUGH: 1
ENDOCRINE NEGATIVE: 1
EYES NEGATIVE: 1

## 2019-11-18 ASSESSMENT — PAIN SCALES - GENERAL: PAINLEVEL: MODERATE PAIN (4)

## 2019-11-18 NOTE — PROGRESS NOTES
SUBJECTIVE:   Mckayla Jeff is a 34 year old female presenting with a chief complaint of   Chief Complaint   Patient presents with     URI     cough, chills, throat is on fire, and running nose-symptoms started on Sat. per patient-she just finished antibiotic for pneumonia on Tues        She is an established patient of [facility].  Patient recently treated for pneumonia with doxycycline.  Finished abx on 11/11.  On 11/16 patient began with URI like symptoms.  No fever and no treatment since symptoms started.    URI Adult    Onset of symptoms was 3 day(s) ago.  Course of illness is same.    Severity moderate  Current and Associated symptoms: runny nose, stuffy nose, cough - productive, sore throat and headache  Treatment measures tried include None tried.  Predisposing factors include recent illness .        Review of Systems   Constitutional: Positive for activity change and appetite change. Negative for fever.   HENT: Positive for congestion and sore throat.    Eyes: Negative.    Respiratory: Positive for cough.    Cardiovascular: Negative.    Gastrointestinal: Negative.    Endocrine: Negative.    Genitourinary: Negative.    Musculoskeletal: Positive for myalgias.   Skin: Negative.    All other systems reviewed and are negative.      Past Medical History:   Diagnosis Date     Contusion of knee 12/9/2014     Hypertension      Obesity      PCOS (polycystic ovarian syndrome)      Family History   Problem Relation Age of Onset     Hypertension Mother      Obesity Mother      Depression Mother      Cancer - colorectal Father      Diabetes Maternal Aunt      Depression Maternal Aunt      Asthma No family hx of      Cancer No family hx of      Blood Disease No family hx of      Neurologic Disorder No family hx of      Eye Disorder No family hx of      Thyroid Disease No family hx of      Heart Disease No family hx of      Lipids No family hx of      Current Outpatient Medications   Medication Sig Dispense Refill      citalopram (CELEXA) 10 MG tablet Take 1 tablet (10 mg) by mouth daily 30 tablet 1     hydrochlorothiazide (HYDRODIURIL) 25 MG tablet Take 1 tablet (25 mg) by mouth daily 90 tablet 0     losartan (COZAAR) 100 MG tablet Take 1 tablet (100 mg) by mouth daily 90 tablet 0     losartan-hydrochlorothiazide (HYZAAR) 100-25 MG tablet Take 1 tablet by mouth daily  3     metFORMIN (GLUCOPHAGE) 500 MG tablet Take 1 tablet (500 mg) by mouth 2 times daily (with meals) 180 tablet 3     potassium chloride ER (K-DUR/KLOR-CON M) 10 MEQ CR tablet Take 2 tablets (20 mEq) by mouth daily 180 tablet 3     ondansetron (ZOFRAN ODT) 4 MG ODT tab Take 1-2 tablets (4-8 mg) by mouth every 8 hours as needed for nausea (Patient not taking: Reported on 11/11/2019) 20 tablet 1     Social History     Tobacco Use     Smoking status: Never Smoker     Smokeless tobacco: Never Used   Substance Use Topics     Alcohol use: No       OBJECTIVE  /88 (BP Location: Left arm, Patient Position: Sitting, Cuff Size: Adult Large)   Pulse 84   Temp 98  F (36.7  C) (Tympanic)   Resp 18   Wt 133.1 kg (293 lb 6.4 oz)   LMP 11/02/2019 (Approximate)   SpO2 100%   BMI 42.10 kg/m      Physical Exam  Constitutional:       Appearance: Normal appearance. She is normal weight.   HENT:      Head: Normocephalic and atraumatic.      Right Ear: Tympanic membrane normal.      Left Ear: Tympanic membrane normal.      Mouth/Throat:      Mouth: Mucous membranes are moist.      Pharynx: Oropharynx is clear. No posterior oropharyngeal erythema.   Eyes:      Extraocular Movements: Extraocular movements intact.      Conjunctiva/sclera: Conjunctivae normal.   Neck:      Musculoskeletal: Normal range of motion and neck supple.   Cardiovascular:      Rate and Rhythm: Normal rate and regular rhythm.      Pulses: Normal pulses.      Heart sounds: Normal heart sounds.   Pulmonary:      Effort: Pulmonary effort is normal.      Breath sounds: Normal breath sounds.   Musculoskeletal:  Normal range of motion.   Skin:     General: Skin is warm and dry.      Capillary Refill: Capillary refill takes less than 2 seconds.   Neurological:      General: No focal deficit present.      Mental Status: She is alert and oriented to person, place, and time. Mental status is at baseline.   Psychiatric:         Mood and Affect: Mood normal.         Behavior: Behavior normal.         Labs:  Results for orders placed or performed in visit on 11/18/19 (from the past 24 hour(s))   Strep, Rapid Screen   Result Value Ref Range    Specimen Description Throat     Rapid Strep A Screen       NEGATIVE: No Group A streptococcal antigen detected by immunoassay, await culture report.       X-Ray was not done.    ASSESSMENT:      ICD-10-CM    1. Sore throat J02.9 Strep, Rapid Screen     Beta strep group A culture   2. Upper respiratory tract infection, unspecified type J06.9         Medical Decision Making:    Differential Diagnosis:  URI Adult/Peds:  Viral upper respiratory illness    Serious Comorbid Conditions:  Adult:  None    PLAN:    URI Adult:  Tylenol, Ibuprofen, Fluids, Rest, OTC cough suppressant/expectorant and OTC decongestant/antihistamine.  Recommended coricedan.      Followup:    If not improving or if condition worsens, follow up with your Primary Care Provider, If not improving or if conditions worsens over the next 12-24 hours, go to the Emergency Department    Patient Instructions       Patient Education     Viral Upper Respiratory Illness (Adult)    You have a viral upper respiratory illness (URI), which is another term for the common cold. This illness is contagious during the first few days. It is spread through the air by coughing and sneezing. It may also be spread by direct contact (touching the sick person and then touching your own eyes, nose, or mouth). Frequent handwashing will decrease risk of spread. Most viral illnesses go away within 7 to 10 days with rest and simple home remedies. Sometimes  the illness may last for several weeks. Antibiotics will not kill a virus, and they are generally not prescribed for this condition.  Home care    If symptoms are severe, rest at home for the first 2 to 3 days. When you resume activity, don't let yourself get too tired.    Don't smoke. If you need help stopping, talk with your healthcare provider.    Avoid being exposed to cigarette smoke (yours or others ).    You may use acetaminophen or ibuprofen to control pain and fever, unless another medicine was prescribed. If you have chronic liver or kidney disease, have ever had a stomach ulcer or gastrointestinal bleeding, or are taking blood-thinning medicines, talk with your healthcare provider before using these medicines. Aspirin should never be given to anyone under 18 years of age who is ill with a viral infection or fever. It may cause severe liver or brain damage.    Your appetite may be poor, so a light diet is fine. Stay well hydrated by drinking 6 to 8 glasses of fluids per day (water, soft drinks, juices, tea, or soup). Extra fluids will help loosen secretions in the nose and lungs.    Over-the-counter cold medicines will not shorten the length of time you re sick, but they may be helpful for the following symptoms: cough, sore throat, and nasal and sinus congestion. If you take prescription medicines, ask your healthcare provider or pharmacist which over-the-counter medicines are safe to use. (Note: Don't use decongestants if you have high blood pressure.)  Follow-up care  Follow up with your healthcare provider, or as advised.  When to seek medical advice  Call your healthcare provider right away if any of these occur:    Cough with lots of colored sputum (mucus)    Severe headache; face, neck, or ear pain    Difficulty swallowing due to throat pain    Fever of 100.4 F (38 C) or higher, or as directed by your healthcare provider  Call 911  Call 911 if any of these occur:    Chest pain, shortness of breath,  wheezing, or difficulty breathing    Coughing up blood    Very severe pain with swallowing, especially if it goes along with a muffled voice   Date Last Reviewed: 6/1/2018 2000-2018 [employer]. [address] [place] [address], [place] [address]. All rights reserved. This information is not intended as a substitute for professional medical care. Always follow your healthcare professional's instructions.

## 2019-11-18 NOTE — PATIENT INSTRUCTIONS

## 2019-11-19 LAB
BACTERIA SPEC CULT: NORMAL
SPECIMEN SOURCE: NORMAL

## 2019-11-21 ENCOUNTER — THERAPY VISIT (OUTPATIENT)
Dept: PHYSICAL THERAPY | Facility: CLINIC | Age: 34
End: 2019-11-21
Payer: OTHER MISCELLANEOUS

## 2019-11-21 DIAGNOSIS — S76.311A STRAIN OF RIGHT HAMSTRING: ICD-10-CM

## 2019-11-21 PROCEDURE — 97140 MANUAL THERAPY 1/> REGIONS: CPT | Mod: GP | Performed by: PHYSICAL THERAPIST

## 2019-11-21 PROCEDURE — 97110 THERAPEUTIC EXERCISES: CPT | Mod: GP | Performed by: PHYSICAL THERAPIST

## 2019-11-25 ENCOUNTER — OFFICE VISIT (OUTPATIENT)
Dept: FAMILY MEDICINE | Facility: CLINIC | Age: 34
End: 2019-11-25
Payer: COMMERCIAL

## 2019-11-25 VITALS
OXYGEN SATURATION: 97 % | BODY MASS INDEX: 41.95 KG/M2 | TEMPERATURE: 98 F | DIASTOLIC BLOOD PRESSURE: 100 MMHG | HEART RATE: 81 BPM | HEIGHT: 70 IN | WEIGHT: 293 LBS | SYSTOLIC BLOOD PRESSURE: 160 MMHG

## 2019-11-25 DIAGNOSIS — E66.01 OBESITY, CLASS III, BMI 40-49.9 (MORBID OBESITY) (H): ICD-10-CM

## 2019-11-25 DIAGNOSIS — I10 ESSENTIAL HYPERTENSION WITH GOAL BLOOD PRESSURE LESS THAN 140/90: ICD-10-CM

## 2019-11-25 DIAGNOSIS — S76.311D STRAIN OF RIGHT HAMSTRING, SUBSEQUENT ENCOUNTER: Primary | ICD-10-CM

## 2019-11-25 PROCEDURE — 99213 OFFICE O/P EST LOW 20 MIN: CPT | Performed by: NURSE PRACTITIONER

## 2019-11-25 PROCEDURE — 99212 OFFICE O/P EST SF 10 MIN: CPT | Performed by: NURSE PRACTITIONER

## 2019-11-25 ASSESSMENT — MIFFLIN-ST. JEOR: SCORE: 2136.04

## 2019-11-25 ASSESSMENT — PAIN SCALES - GENERAL: PAINLEVEL: NO PAIN (0)

## 2019-11-25 NOTE — PATIENT INSTRUCTIONS
At [facility], we strive to deliver an exceptional experience to you, every time we see you. If you receive a survey, please complete it as we do value your feedback.  If you have MyChart, you can expect to receive results automatically within 24 hours of their completion.  Your provider will send a note interpreting your results as well.   If you do not have MyChart, you should receive your results in about a week by mail.    Your care team:                            Family Medicine Internal Medicine   MD Triston Vegas MD Shantel Branch-Fleming, MD Katya Georgiev PA-C Megan Hill, APRMARKO Polanco, MD Pediatrics   Gregor Brown, PAJose AlfredoC  Wendy Mccullough, MD Corazon Mena APRN CNP   MD Kristin Menendez MD Deborah Mielke, MD Kim Thein, APRN CNP      Clinic hours: Monday - Thursday 7 am-7 pm; Fridays 7 am-5 pm.   Urgent care: Monday - Friday 11 am-9 pm; Saturday and Sunday 9 am-5 pm.  Pharmacy : Monday -Thursday 8 am-8 pm; Friday 8 am-6 pm; Saturday and Sunday 9 am-5 pm.     Clinic: [phone]   Pharmacy: [phone]

## 2019-11-25 NOTE — PROGRESS NOTES
Subjective     Mckayla Jeff is a 34 year old female who presents to clinic today for the following health issues:    HPI     Concern - Pulled Hamstring Follow up  Patient needs updated workability form completed.    Description:   Patient states the hamstring is better . Some days she is in more pain than other     Intensity: 9/10 ,last time pain was 9/10 was a week ago.    Progression of Symptoms:  improving       Precipitating factors:   Worsened by: Walking more than a few hours     Alleviating factors:  Improved by: Rest     Therapies Tried and outcome: Physical Therapy once a week  Is helping  She works as CNA in homecare/hospice, states she doesn't do much lifting/transferring of patients but does have to bend/squat and this is difficult for her. She continues with physical therapy weekly for 3 additional weeks at which time they'll re evaluate her progress.         Patient Active Problem List   Diagnosis     Essential hypertension with goal blood pressure less than 140/90     CARDIOVASCULAR SCREENING; LDL GOAL LESS THAN 160     PCOS (polycystic ovarian syndrome)     Obesity, Class III, BMI 40-49.9 (morbid obesity) (H)     Synovitis of knee     Internal derangement of knee     Adjustment disorder with mixed anxiety and depressed mood     Health Care Home     Moderate recurrent major depression (H)     Other chest pain     Strain of right hamstring     Past Surgical History:   Procedure Laterality Date     NO HISTORY OF SURGERY         Social History     Tobacco Use     Smoking status: Never Smoker     Smokeless tobacco: Never Used   Substance Use Topics     Alcohol use: No     Family History   Problem Relation Age of Onset     Hypertension Mother      Obesity Mother      Depression Mother      Cancer - colorectal Father      Diabetes Maternal Aunt      Depression Maternal Aunt      Asthma No family hx of      Cancer No family hx of      Blood Disease No family hx of      Neurologic Disorder No family hx  "of      Eye Disorder No family hx of      Thyroid Disease No family hx of      Heart Disease No family hx of      Lipids No family hx of          Current Outpatient Medications   Medication Sig Dispense Refill     hydrochlorothiazide (HYDRODIURIL) 25 MG tablet Take 1 tablet (25 mg) by mouth daily 90 tablet 0     losartan (COZAAR) 100 MG tablet Take 1 tablet (100 mg) by mouth daily 90 tablet 0     losartan-hydrochlorothiazide (HYZAAR) 100-25 MG tablet Take 1 tablet by mouth daily  3     metFORMIN (GLUCOPHAGE) 500 MG tablet Take 1 tablet (500 mg) by mouth 2 times daily (with meals) 180 tablet 3     potassium chloride ER (K-DUR/KLOR-CON M) 10 MEQ CR tablet Take 2 tablets (20 mEq) by mouth daily 180 tablet 3     citalopram (CELEXA) 10 MG tablet Take 1 tablet (10 mg) by mouth daily (Patient not taking: Reported on 11/25/2019) 30 tablet 1     ondansetron (ZOFRAN ODT) 4 MG ODT tab Take 1-2 tablets (4-8 mg) by mouth every 8 hours as needed for nausea (Patient not taking: Reported on 11/11/2019) 20 tablet 1     BP Readings from Last 3 Encounters:   11/25/19 (!) 160/100   11/18/19 130/88   11/11/19 126/80    Wt Readings from Last 3 Encounters:   11/25/19 136.1 kg (300 lb)   11/18/19 133.1 kg (293 lb 6.4 oz)   11/11/19 133.4 kg (294 lb)                    Reviewed and updated as needed this visit by Provider         Review of Systems   ROS COMP: Constitutional, HEENT, cardiovascular, pulmonary, gi and gu systems are negative, except as otherwise noted.      Objective    BP (!) 149/100 (BP Location: Left arm, Patient Position: Sitting, Cuff Size: Adult Large)   Pulse 81   Temp 98  F (36.7  C) (Oral)   Ht 1.778 m (5' 10\")   Wt 136.1 kg (300 lb)   LMP 11/02/2019 (Approximate)   SpO2 97%   BMI 43.05 kg/m    Body mass index is 43.05 kg/m .  Physical Exam   GENERAL: healthy, alert and no distress  EYES: Eyes grossly normal to inspection, PERRL and conjunctivae and sclerae normal  HENT: ear canals and TM's normal, nose and " "mouth without ulcers or lesions  NECK: no adenopathy, no asymmetry, masses, or scars and thyroid normal to palpation  RESP: lungs clear to auscultation - no rales, rhonchi or wheezes  CV: regular rate and rhythm, normal S1 S2, no S3 or S4, no murmur, click or rub, no peripheral edema and peripheral pulses strong  ABDOMEN: soft, nontender, no hepatosplenomegaly, no masses and bowel sounds normal  MS: no gross musculoskeletal defects noted, no edema  SKIN: no suspicious lesions or rashes  NEURO: Normal strength and tone, mentation intact and speech normal  PSYCH: mentation appears normal, affect normal/bright  LYMPH: normal ant/post cervical, supraclavicular nodes    Diagnostic Test Results:  Labs reviewed in Epic  none         Assessment & Plan     1. Strain of right hamstring, subsequent encounter  Continue with PT, workability form completed       BMI:   Estimated body mass index is 43.05 kg/m  as calculated from the following:    Height as of this encounter: 1.778 m (5' 10\").    Weight as of this encounter: 136.1 kg (300 lb).   Weight management plan: Discussed healthy diet and exercise guidelines        Work on weight loss  Regular exercise  See Patient Instructions    No follow-ups on file.    MELANIA Domingo [facility]    "

## 2019-11-29 ENCOUNTER — THERAPY VISIT (OUTPATIENT)
Dept: PHYSICAL THERAPY | Facility: CLINIC | Age: 34
End: 2019-11-29
Payer: OTHER MISCELLANEOUS

## 2019-11-29 DIAGNOSIS — S76.311D STRAIN OF RIGHT HAMSTRING, SUBSEQUENT ENCOUNTER: ICD-10-CM

## 2019-11-29 PROCEDURE — 97110 THERAPEUTIC EXERCISES: CPT | Mod: GP | Performed by: PHYSICAL THERAPIST

## 2019-11-29 PROCEDURE — 97140 MANUAL THERAPY 1/> REGIONS: CPT | Mod: GP | Performed by: PHYSICAL THERAPIST

## 2019-11-29 NOTE — PROGRESS NOTES
Subjective     Mckayla Jeff is a 34 year old female who presents to clinic today for the following health issues:    HPI   Hypertension Follow-up      Do you check your blood pressure regularly outside of the clinic? Yes     Are you following a low salt diet? No    Are your blood pressures ever more than 140 on the top number (systolic) OR more   than 90 on the bottom number (diastolic), for example 140/90? No      How many servings of fruits and vegetables do you eat daily?  2-3    On average, how many sweetened beverages do you drink each day (soda, juice, sweet tea, etc)?   1-2 per week   How many days per week do you miss taking your medication? 1    What makes it hard for you to take your medications?  remembering to take  Has not taken her medication yet today.    Patient Active Problem List   Diagnosis     Essential hypertension with goal blood pressure less than 140/90     CARDIOVASCULAR SCREENING; LDL GOAL LESS THAN 160     PCOS (polycystic ovarian syndrome)     Obesity, Class III, BMI 40-49.9 (morbid obesity) (H)     Synovitis of knee     Internal derangement of knee     Adjustment disorder with mixed anxiety and depressed mood     Health Care Home     Moderate recurrent major depression (H)     Other chest pain     Strain of right hamstring     Past Surgical History:   Procedure Laterality Date     NO HISTORY OF SURGERY         Social History     Tobacco Use     Smoking status: Never Smoker     Smokeless tobacco: Never Used   Substance Use Topics     Alcohol use: No     Family History   Problem Relation Age of Onset     Hypertension Mother      Obesity Mother      Depression Mother      Cancer - colorectal Father      Diabetes Maternal Aunt      Depression Maternal Aunt      Asthma No family hx of      Cancer No family hx of      Blood Disease No family hx of      Neurologic Disorder No family hx of      Eye Disorder No family hx of      Thyroid Disease No family hx of      Heart Disease No family  "hx of      Lipids No family hx of          Current Outpatient Medications   Medication Sig Dispense Refill     hydrochlorothiazide (HYDRODIURIL) 25 MG tablet Take 1 tablet (25 mg) by mouth daily 90 tablet 0     losartan (COZAAR) 100 MG tablet Take 1 tablet (100 mg) by mouth daily 90 tablet 0     losartan-hydrochlorothiazide (HYZAAR) 100-25 MG tablet Take 1 tablet by mouth daily  3     metFORMIN (GLUCOPHAGE) 500 MG tablet Take 1 tablet (500 mg) by mouth 2 times daily (with meals) 180 tablet 3     potassium chloride ER (K-DUR/KLOR-CON M) 10 MEQ CR tablet Take 2 tablets (20 mEq) by mouth daily 180 tablet 3     citalopram (CELEXA) 10 MG tablet Take 1 tablet (10 mg) by mouth daily (Patient not taking: Reported on 11/25/2019) 30 tablet 1     ondansetron (ZOFRAN ODT) 4 MG ODT tab Take 1-2 tablets (4-8 mg) by mouth every 8 hours as needed for nausea (Patient not taking: Reported on 11/11/2019) 20 tablet 1     BP Readings from Last 3 Encounters:   11/25/19 (!) 160/100   11/18/19 130/88   11/11/19 126/80    Wt Readings from Last 3 Encounters:   11/25/19 136.1 kg (300 lb)   11/18/19 133.1 kg (293 lb 6.4 oz)   11/11/19 133.4 kg (294 lb)            Reviewed and updated as needed this visit by Provider         Review of Systems   ROS COMP: Constitutional, HEENT, cardiovascular, pulmonary, gi and gu systems are negative, except as otherwise noted.      Objective    BP (!) 160/100   Pulse 81   Temp 98  F (36.7  C) (Oral)   Ht 1.778 m (5' 10\")   Wt 136.1 kg (300 lb)   LMP 11/02/2019 (Approximate)   SpO2 97%   BMI 43.05 kg/m    Body mass index is 43.05 kg/m .  Physical Exam   GENERAL: healthy, alert and no distress  NECK: no adenopathy, no asymmetry, masses, or scars and thyroid normal to palpation  RESP: lungs clear to auscultation - no rales, rhonchi or wheezes  CV: regular rate and rhythm, normal S1 S2, no S3 or S4, no murmur, click or rub, no peripheral edema and peripheral pulses strong  ABDOMEN: soft, nontender, no " "hepatosplenomegaly, no masses and bowel sounds normal  SKIN: no suspicious lesions or rashes  PSYCH: mentation appears normal, affect normal/bright  LYMPH: normal ant/post cervical, supraclavicular nodes    Diagnostic Test Results:  Labs reviewed in Epic  none         Assessment & Plan     1.  Obesity, Class III, BMI 40-49.9 (morbid obesity) (H)  Benefits of weight loss reviewed in detail, encouraged her to cut back on the carbohydrates in the diet, consume more fruits and vegetables, drink plenty of water, avoid fruit juices, sodas, get 150 min moderate exercise/week.  Recheck weight in 6 months.      2. Essential hypertension with goal blood pressure less than 140/90  BP elevated today but she hasn't taken her medication- reviewed setting an alarm on her watch to remind her to take her medication, keep a few pills in her purse so if she forgets to take her medication at home, she can take it when she remembers. Low salt diet reviewed.         BMI:   Estimated body mass index is 43.05 kg/m  as calculated from the following:    Height as of this encounter: 1.778 m (5' 10\").    Weight as of this encounter: 136.1 kg (300 lb).               Return in about 4 weeks (around 12/23/2019), or if symptoms worsen or fail to improve, for Routine Visit, BP Recheck.    MELANIA Domingo [facility]        "

## 2019-12-05 ENCOUNTER — OFFICE VISIT (OUTPATIENT)
Dept: PSYCHOLOGY | Facility: CLINIC | Age: 34
End: 2019-12-05
Attending: FAMILY MEDICINE
Payer: COMMERCIAL

## 2019-12-05 DIAGNOSIS — F33.1 MAJOR DEPRESSIVE DISORDER, RECURRENT EPISODE, MODERATE WITH ANXIOUS DISTRESS (H): Primary | ICD-10-CM

## 2019-12-05 DIAGNOSIS — F41.1 GAD (GENERALIZED ANXIETY DISORDER): ICD-10-CM

## 2019-12-05 PROCEDURE — 90834 PSYTX W PT 45 MINUTES: CPT | Performed by: SOCIAL WORKER

## 2019-12-05 ASSESSMENT — ANXIETY QUESTIONNAIRES
5. BEING SO RESTLESS THAT IT IS HARD TO SIT STILL: NEARLY EVERY DAY
GAD7 TOTAL SCORE: 16
IF YOU CHECKED OFF ANY PROBLEMS ON THIS QUESTIONNAIRE, HOW DIFFICULT HAVE THESE PROBLEMS MADE IT FOR YOU TO DO YOUR WORK, TAKE CARE OF THINGS AT HOME, OR GET ALONG WITH OTHER PEOPLE: SOMEWHAT DIFFICULT
7. FEELING AFRAID AS IF SOMETHING AWFUL MIGHT HAPPEN: NOT AT ALL
2. NOT BEING ABLE TO STOP OR CONTROL WORRYING: NEARLY EVERY DAY
3. WORRYING TOO MUCH ABOUT DIFFERENT THINGS: NEARLY EVERY DAY
1. FEELING NERVOUS, ANXIOUS, OR ON EDGE: NEARLY EVERY DAY
6. BECOMING EASILY ANNOYED OR IRRITABLE: SEVERAL DAYS

## 2019-12-05 ASSESSMENT — PATIENT HEALTH QUESTIONNAIRE - PHQ9
5. POOR APPETITE OR OVEREATING: NEARLY EVERY DAY
SUM OF ALL RESPONSES TO PHQ QUESTIONS 1-9: 23

## 2019-12-05 NOTE — PROGRESS NOTES
Progress Note - Initial Session    Client Name:  Mckayla Jeff Date: 12-05-19         Service Type: Individual  Video Visit: No     Session Start Time: 1;30   Session End Time: 2;15     Session Length: 45    Session #: 1    Attendees: Client     DATA:  Diagnostic Assessment in progress.  Unable to complete documentation at the conclusion of the first session due to extensive social hx.    Interactive Complexity: No  Crisis: No    Intervention:  Motivational Interviewing: [facility]    ASSESSMENT:  Mental Status Assessment:  Appearance:   Appropriate   Eye Contact:   Fair   Psychomotor Behavior: Normal   Attitude:   Cooperative   Orientation:   All  Speech   Rate / Production: Impoverished    Volume:  Normal   Mood:    Anxious  Depressed  Sad   Affect:    Blunted  Subdued  Worrisome   Thought Content:  Rumination   Thought Form:  Coherent  Goal Directed  Logical   Insight:    Fair       Safety Issues and Plan for Safety and Risk Management:  Client denies current fears or concerns for personal safety.  Client denies current or recent suicidal ideation or behaviors.  Client denies current or recent homicidal ideation or behaviors.  Client denies current or recent self injurious behavior or ideation.  Client denies other safety concerns.  Recommended that patient call 911 or go to the local ED should there be a change in any of these risk factors.  Client reports there are no firearms in the house.      Diagnostic Criteria:  A. Excessive anxiety and worry about a number of events or activities (such as work or school performance).   B. The person finds it difficult to control the worry.  C. Select 3 or more symptoms (required for diagnosis). Only one item is required in children.   - Restlessness or feeling keyed up or on edge.    - Being easily fatigued.    - Difficulty concentrating or mind going blank.    - Irritability.    - Muscle tension.    - Sleep disturbance (difficulty falling or  staying asleep, or restless unsatisfying sleep).   D. The focus of the anxiety and worry is not confined to features of an Axis I disorder.  E. The anxiety, worry, or physical symptoms cause clinically significant distress or impairment in social, occupational, or other important areas of functioning.   F. The disturbance is not due to the direct physiological effects of a substance (e.g., a drug of abuse, a medication) or a general medical condition (e.g., hyperthyroidism) and does not occur exclusively during a Mood Disorder, a Psychotic Disorder, or a Pervasive Developmental Disorder.  A) Recurrent episode(s) - symptoms have been present during the same 2-week period and represent a change from previous functioning 5 or more symptoms (required for diagnosis)   - Depressed mood. Note: In children and adolescents, can be irritable mood.     - Diminished interest or pleasure in all, or almost all, activities.    - Significant weight loss when not dieting decrease in appetite.    - Increased sleep.    - Psychomotor activity agitation.    - Fatigue or loss of energy.    - Feelings of worthlessness or inappropriate and excessive guilt.    - Diminished ability to think or concentrate, or indecisiveness.   B) The symptoms cause clinically significant distress or impairment in social, occupational, or other important areas of functioning  C) The episode is not attributable to the physiological effects of a substance or to another medical condition  D) The occurence of major depressive episode is not better explained by other thought / psychotic disorders  E) There has never been a manic episode or hypomanic episode      DSM5 Diagnoses: (Sustained by DSM5 Criteria Listed Above)  Diagnoses: 296.32 (F33.1) Major Depressive Disorder, Recurrent Episode, Moderate _ and With anxious distress  300.02 (F41.1) Generalized Anxiety Disorder  Psychosocial & Contextual Factors: Family relationship issues, recent break-up of boyfriend,  financial stress, currently on medical leave from work,   WHODAS 2.0 (12 item)            This questionnaire asks about difficulties due to health conditions. Health conditions  include  disease or illnesses, other health problems that may be short or long lasting,  injuries, mental health or emotional problems, and problems with alcohol or drugs.                     Think back over the past 30 days and answer these questions, thinking about how much  difficulty you had doing the following activities. For each question, please Trib[place] only  one response.    S1 Standing for long periods such as 30 minutes? None =         1   S2 Taking care of household responsibilities? None =         1   S3 Learning a new task, for example, learning how to get to a new place? None =         1   S4 How much of a problem do you have joining community activities (for example, festivals, Orthodoxy or other activities) in the same way as anyone else can? Extreme / or cannot do = 5   S5 How much have you been emotionally affected by your health problems? Moderate =   3     In the past 30 days, how much difficulty did you have in:   S6 Concentrating on doing something for ten minutes? Severe =       4   S7 Walking a long distance such as a kilometer (or equivalent)? Moderate =   3   S8 Washing your whole body? None =         1   S9 Getting dressed? None =         1   S10 Dealing with people you do not know? None =         1   S11 Maintaining a friendship? None =         1   S12 Your day to day work? Mild =           2     H1 Overall, in the past 30 days, how many days were these difficulties present? Record number of days 30   H2 In the past 30 days, for how many days were you totally unable to carry out your usual activities or work because of any health condition? Record number of days  30   H3 In the past 30 days, not counting the days that you were totally unable, for how many days did you cut back or reduce your usual activities or  work because of any health condition? Record number of days 30       Collateral Reports Completed:  Routed note to PCP      PLAN: (Homework, other):  Client scheduled ongoing services with [facility].        GERI Farias

## 2019-12-05 NOTE — Clinical Note
Nish Sears saw our patient for her first counseling session and will complete her diagnostic assessment at our next session.  I will be helping patient with her depression and anxiety.   Agusto Pacheco

## 2019-12-06 ENCOUNTER — THERAPY VISIT (OUTPATIENT)
Dept: PHYSICAL THERAPY | Facility: CLINIC | Age: 34
End: 2019-12-06
Payer: OTHER MISCELLANEOUS

## 2019-12-06 DIAGNOSIS — S76.311D STRAIN OF RIGHT HAMSTRING, SUBSEQUENT ENCOUNTER: ICD-10-CM

## 2019-12-06 PROCEDURE — 97140 MANUAL THERAPY 1/> REGIONS: CPT | Mod: GP | Performed by: PHYSICAL THERAPIST

## 2019-12-06 PROCEDURE — 97110 THERAPEUTIC EXERCISES: CPT | Mod: GP | Performed by: PHYSICAL THERAPIST

## 2019-12-06 ASSESSMENT — ANXIETY QUESTIONNAIRES: GAD7 TOTAL SCORE: 16

## 2019-12-13 ENCOUNTER — THERAPY VISIT (OUTPATIENT)
Dept: PHYSICAL THERAPY | Facility: CLINIC | Age: 34
End: 2019-12-13
Payer: OTHER MISCELLANEOUS

## 2019-12-13 DIAGNOSIS — S76.311D STRAIN OF RIGHT HAMSTRING, SUBSEQUENT ENCOUNTER: ICD-10-CM

## 2019-12-13 PROCEDURE — 97110 THERAPEUTIC EXERCISES: CPT | Mod: GP | Performed by: PHYSICAL THERAPIST

## 2019-12-13 PROCEDURE — 97140 MANUAL THERAPY 1/> REGIONS: CPT | Mod: GP | Performed by: PHYSICAL THERAPIST

## 2019-12-13 NOTE — PROGRESS NOTES
Subjective:  HPI                    Objective:  System    Physical Exam    General     ROS    Assessment/Plan:    PROGRESS  REPORT    Progress reporting period is from 11/8/2019 to 12/13/2019.       SUBJECTIVE  Subjective changes noted by patient:   Driving more than 30 min increalking a lot also increases sx.    Current pain level is : 2/10.     Previous pain level was  : 4/10.   Changes in function:  Yes (See Goal flowsheet attached for changes in current functional level)  Adverse reaction to treatment or activity: activity - prolong driving (> 30 min) increases sx    OBJECTIVE  Objective: full motion.  Tenderness mid hamstring     ASSESSMENT/PLAN  Updated problem list and treatment plan:Diagnosis 1:  R HS strain  Pain -  manual therapy, self management, education, directional preference exercise and home program  Decreased ROM/flexibility - manual therapy and therapeutic exercise  Decreased strength - therapeutic exercise and therapeutic activities  Decreased function - therapeutic activities      STG/LTGs have been met or progress has been made towards goals:  Yes (See Goal flow sheet completed today.)  Assessment of Progress: The patient's condition is improving.  Self Management Plans:  Patient has been instructed in a home treatment program.    Mckayla continues to require the following intervention to meet STG and LTG's:  Will await orders    Recommendations:  Will await orders for continued therapy.  Pt will require additional WC auth if she returns to PT.  If no additional PT ordered, pt will be discharged.      Please refer to the daily flowsheet for treatment today, total treatment time and time spent performing 1:1 timed codes.

## 2019-12-20 ENCOUNTER — OFFICE VISIT (OUTPATIENT)
Dept: FAMILY MEDICINE | Facility: CLINIC | Age: 34
End: 2019-12-20
Payer: OTHER MISCELLANEOUS

## 2019-12-20 VITALS
SYSTOLIC BLOOD PRESSURE: 138 MMHG | DIASTOLIC BLOOD PRESSURE: 84 MMHG | WEIGHT: 289 LBS | TEMPERATURE: 98.4 F | HEART RATE: 104 BPM | BODY MASS INDEX: 41.37 KG/M2 | HEIGHT: 70 IN | OXYGEN SATURATION: 97 %

## 2019-12-20 DIAGNOSIS — S76.311D STRAIN OF RIGHT HAMSTRING, SUBSEQUENT ENCOUNTER: Primary | ICD-10-CM

## 2019-12-20 PROCEDURE — 99213 OFFICE O/P EST LOW 20 MIN: CPT | Performed by: NURSE PRACTITIONER

## 2019-12-20 ASSESSMENT — PAIN SCALES - GENERAL: PAINLEVEL: NO PAIN (0)

## 2019-12-20 ASSESSMENT — MIFFLIN-ST. JEOR: SCORE: 2078.21

## 2019-12-20 NOTE — PATIENT INSTRUCTIONS
At [facility], we strive to deliver an exceptional experience to you, every time we see you.  If you receive a survey in the mail, please send us back your thoughts. We really do value your feedback.    Based on your medical history, these are the current health maintenance/preventive care services that you are due for (some may have been done at this visit.)  Health Maintenance Due   Topic Date Due     PREVENTIVE CARE VISIT  04/11/2018         Suggested websites for health information:  Www.[place].org : Up to date and easily searchable information on multiple topics.  Www.medlineplus.gov : medication info, interactive tutorials, watch real surgeries online  Www.familydoctor.org : good info from the Academy of Family Physicians  Www.cdc.gov : public health info, travel advisories, epidemics (H1N1)  Www.aap.org : children's health info, normal development, vaccinations  Www.health.[facility].[place].us : [place] dept of health, public health issues in [place], N1N1    Your care team:                            Family Medicine Internal Medicine   MD Triston Vegas MD Shantel Branch-Fleming, MD Katya Georgiev PA-C Nam Ho, MD Pediatrics   BERT Romero, BRUNO VELÁZQUEZ CNP   MD Kristin Menendez MD Deborah Mielke, MD Kim Thein, MELANIA [facility]      Clinic hours: Monday - Thursday 7 am-7 pm; Fridays 7 am-5 pm.   Urgent care: Monday - Friday 11 am-9 pm; Saturday and Sunday 9 am-5 pm.  Pharmacy : Monday -Thursday 8 am-8 pm; Friday 8 am-6 pm; Saturday and Sunday 9 am-5 pm.     Clinic: [phone]   Pharmacy: [phone]    Patient Education     Self-Care for Strains and Sprains  Most minor strains and sprains can be treated with self-care. Recovering from a strain or sprain may take 6 to 8 weeks. Your self-care goal is to reduce pain and immobilize the injury to speed healing.     A sprain injures ligaments (tissue that connects bones to bones).       A strain injures muscles or tendons (tissue that connects muscles to bones).   Support the injured area  Wrapping the injured area provides support for short, necessary activities. Be careful not to wrap the area too tightly. This could cut off the blood supply.    Support a wrist, elbow, or shoulder with a sling.    Wrap an ankle or knee with an elastic bandage.    Tape a finger or toe to the one next to it.  Use cold and heat  Cold reduces swelling. Both cold and heat reduce pain. Heat should not be used in the initial treatment of the injury. When using cold or heat, always place a thin towel between the pack and your skin.    Apply ice or a cold pack 10 to 15 minutes every hour you re awake for the first 2 days.    After the swelling goes down, use cold or heat to control pain. Don t use heat late in the day, since it can cause swelling when you re not active.  Rest and elevate  Rest and elevation help your injury heal faster.    Raise the injured area above your heart level.    Keep the injured area from moving.    Limit the use of the joint or limb.  Use medicine    Aspirin reduces pain and swelling. (Note: Don t give aspirin to a child 18 or younger unless prescribed by the doctor.)    Non-steroidal anti-inflammatory medicines, such as ibuprofen, may reduce pain and swelling, as well. Ask your healthcare provider for advice.  When to call your healthcare provider  Call your healthcare provider if:    The injured joint won t move, or bones make a grating sound when they move    You can t put weight on the injured area, even after 24 hours    The injured body part is cold, blue, tingling, or numb    The joint or limb appears bent or crooked.    Pain increases or doesn t improve in 4 days    When pressing along the injured area, you notice a spot that is especially painful  Date Last Reviewed: 5/1/2018 2000-2018 The "[employer]. [address], [place] [address]. All rights reserved. This  information is not intended as a substitute for professional medical care. Always follow your healthcare professional's instructions.

## 2019-12-20 NOTE — PROGRESS NOTES
Subjective     Mckayla Jeff is a 34 year old female who presents to clinic today for the following health issues:    HPI   Joint Pain    Onset: 10/7/19    Description:   Location: right hamstring  Character: no pain    Intensity: no pain    Progression of Symptoms: better    Accompanying Signs & Symptoms:  Other symptoms: none    History:   Previous similar pain: no       Precipitating factors:   Trauma or overuse: YES    Alleviating factors:  Improved by: rest/inactivity and ice    Therapies Tried and outcome: none  Patient had workers comp injury in October and comes in today for order for more physical therapy which has helped with her pain.  She continues to have pain when driving for more than 30 min at a time, with squatting and prolonged standing.  She takes Ibuprofen prior to physical therapy sessions and prn, takes it about once a day.      Patient Active Problem List   Diagnosis     Essential hypertension with goal blood pressure less than 140/90     CARDIOVASCULAR SCREENING; LDL GOAL LESS THAN 160     PCOS (polycystic ovarian syndrome)     Obesity, Class III, BMI 40-49.9 (morbid obesity) (H)     Synovitis of knee     Internal derangement of knee     Adjustment disorder with mixed anxiety and depressed mood     Health Care Home     Moderate recurrent major depression (H)     Other chest pain     Strain of right hamstring     Past Surgical History:   Procedure Laterality Date     NO HISTORY OF SURGERY         Social History     Tobacco Use     Smoking status: Never Smoker     Smokeless tobacco: Never Used   Substance Use Topics     Alcohol use: No     Family History   Problem Relation Age of Onset     Hypertension Mother      Obesity Mother      Depression Mother      Cancer - colorectal Father      Diabetes Maternal Aunt      Depression Maternal Aunt      Asthma No family hx of      Cancer No family hx of      Blood Disease No family hx of      Neurologic Disorder No family hx of      Eye Disorder No  "family hx of      Thyroid Disease No family hx of      Heart Disease No family hx of      Lipids No family hx of          Current Outpatient Medications   Medication Sig Dispense Refill     citalopram (CELEXA) 10 MG tablet Take 1 tablet (10 mg) by mouth daily 30 tablet 1     hydrochlorothiazide (HYDRODIURIL) 25 MG tablet Take 1 tablet (25 mg) by mouth daily 90 tablet 0     losartan (COZAAR) 100 MG tablet Take 1 tablet (100 mg) by mouth daily 90 tablet 0     losartan-hydrochlorothiazide (HYZAAR) 100-25 MG tablet Take 1 tablet by mouth daily  3     metFORMIN (GLUCOPHAGE) 500 MG tablet Take 1 tablet (500 mg) by mouth 2 times daily (with meals) 180 tablet 3     ondansetron (ZOFRAN ODT) 4 MG ODT tab Take 1-2 tablets (4-8 mg) by mouth every 8 hours as needed for nausea 20 tablet 1     potassium chloride ER (K-DUR/KLOR-CON M) 10 MEQ CR tablet Take 2 tablets (20 mEq) by mouth daily 180 tablet 3     BP Readings from Last 3 Encounters:   12/20/19 138/84   11/25/19 (!) 160/100   11/18/19 130/88    Wt Readings from Last 3 Encounters:   12/20/19 131.1 kg (289 lb)   11/25/19 136.1 kg (300 lb)   11/18/19 133.1 kg (293 lb 6.4 oz)            Reviewed and updated as needed this visit by Provider  Tobacco  Allergies  Meds  Problems  Med Hx  Surg Hx  Fam Hx         Review of Systems   ROS COMP: Constitutional, HEENT, cardiovascular, pulmonary, gi and gu systems are negative, except as otherwise noted.      Objective    /84   Pulse 104   Temp 98.4  F (36.9  C) (Oral)   Ht 1.765 m (5' 9.5\")   Wt 131.1 kg (289 lb)   LMP 11/07/2019 (Exact Date)   SpO2 97%   BMI 42.07 kg/m    Body mass index is 42.07 kg/m .  Physical Exam   GENERAL: healthy, alert and no distress  RESP: lungs clear to auscultation - no rales, rhonchi or wheezes  CV: regular rate and rhythm, normal S1 S2, no S3 or S4, no murmur, click or rub, no peripheral edema and peripheral pulses strong  MS: right hamstring- TTP at distal insertion and along body of " muscle, normal flexion with pain at extremes of ROM, normal gait, otherwise,  no gross musculoskeletal defects noted, no edema  SKIN: no suspicious lesions or rashes  NEURO: Normal strength and tone, mentation intact and speech normal  PSYCH: mentation appears normal, affect normal/bright    Diagnostic Test Results:  Labs reviewed in Epic  none         Assessment & Plan     1. Strain of right hamstring, subsequent encounter  Referring for 2-3 weeks additional physical therapy per physical therapy recommendations. She'll need to return to clinic for work clearance to return to work.    - [facility] REFERRAL; Future       Work on weight loss  Regular exercise  See Patient Instructions    Return in about 3 weeks (around 1/10/2020), or if symptoms worsen or fail to improve.    MELANIA Domingo [facility]

## 2020-01-06 ENCOUNTER — THERAPY VISIT (OUTPATIENT)
Dept: PHYSICAL THERAPY | Facility: CLINIC | Age: 35
End: 2020-01-06
Payer: OTHER MISCELLANEOUS

## 2020-01-06 DIAGNOSIS — S76.311D STRAIN OF RIGHT HAMSTRING, SUBSEQUENT ENCOUNTER: ICD-10-CM

## 2020-01-06 PROCEDURE — 97110 THERAPEUTIC EXERCISES: CPT | Mod: GP | Performed by: PHYSICAL THERAPIST

## 2020-01-06 PROCEDURE — 97140 MANUAL THERAPY 1/> REGIONS: CPT | Mod: GP | Performed by: PHYSICAL THERAPIST

## 2020-01-17 ENCOUNTER — OFFICE VISIT (OUTPATIENT)
Dept: FAMILY MEDICINE | Facility: CLINIC | Age: 35
End: 2020-01-17
Payer: OTHER MISCELLANEOUS

## 2020-01-17 VITALS
HEIGHT: 70 IN | SYSTOLIC BLOOD PRESSURE: 145 MMHG | WEIGHT: 292 LBS | DIASTOLIC BLOOD PRESSURE: 75 MMHG | BODY MASS INDEX: 41.8 KG/M2 | HEART RATE: 98 BPM | OXYGEN SATURATION: 99 % | RESPIRATION RATE: 16 BRPM | TEMPERATURE: 98.5 F

## 2020-01-17 DIAGNOSIS — S76.311D STRAIN OF RIGHT HAMSTRING, SUBSEQUENT ENCOUNTER: ICD-10-CM

## 2020-01-17 PROCEDURE — 99213 OFFICE O/P EST LOW 20 MIN: CPT | Performed by: NURSE PRACTITIONER

## 2020-01-17 ASSESSMENT — MIFFLIN-ST. JEOR: SCORE: 2091.81

## 2020-01-17 ASSESSMENT — PAIN SCALES - GENERAL: PAINLEVEL: NO PAIN (0)

## 2020-01-17 NOTE — PROGRESS NOTES
Subjective     Mckayla Jeff is a 34 year old female who presents to clinic today for the following health issues:    HPI   Joint Pain    Onset: 10/7/19    Description:   Location: Right Hamstring  Character: No Pain    Intensity: No Pain    Progression of Symptoms: better    Accompanying Signs & Symptoms:  Other symptoms: none    History:   Previous similar pain: no       Precipitating factors:   Trauma or overuse: YES    Alleviating factors:  Improved by: physical therapy    Therapies Tried and outcome: PT     Patient has continued with physical therapy and is ready to return to work without restrictions.  She denies pain, no gait abnormalities.         Patient Active Problem List   Diagnosis     Essential hypertension with goal blood pressure less than 140/90     CARDIOVASCULAR SCREENING; LDL GOAL LESS THAN 160     PCOS (polycystic ovarian syndrome)     Obesity, Class III, BMI 40-49.9 (morbid obesity) (H)     Synovitis of knee     Internal derangement of knee     Adjustment disorder with mixed anxiety and depressed mood     Health Care Home     Moderate recurrent major depression (H)     Other chest pain     Strain of right hamstring     Past Surgical History:   Procedure Laterality Date     NO HISTORY OF SURGERY         Social History     Tobacco Use     Smoking status: Never Smoker     Smokeless tobacco: Never Used   Substance Use Topics     Alcohol use: No     Family History   Problem Relation Age of Onset     Hypertension Mother      Obesity Mother      Depression Mother      Cancer - colorectal Father      Diabetes Maternal Aunt      Depression Maternal Aunt      Asthma No family hx of      Cancer No family hx of      Blood Disease No family hx of      Neurologic Disorder No family hx of      Eye Disorder No family hx of      Thyroid Disease No family hx of      Heart Disease No family hx of      Lipids No family hx of          Current Outpatient Medications   Medication Sig Dispense Refill      "citalopram (CELEXA) 10 MG tablet Take 1 tablet (10 mg) by mouth daily 30 tablet 1     hydrochlorothiazide (HYDRODIURIL) 25 MG tablet Take 1 tablet (25 mg) by mouth daily 90 tablet 0     losartan (COZAAR) 100 MG tablet Take 1 tablet (100 mg) by mouth daily 90 tablet 0     losartan-hydrochlorothiazide (HYZAAR) 100-25 MG tablet Take 1 tablet by mouth daily  3     metFORMIN (GLUCOPHAGE) 500 MG tablet Take 1 tablet (500 mg) by mouth 2 times daily (with meals) 180 tablet 3     ondansetron (ZOFRAN ODT) 4 MG ODT tab Take 1-2 tablets (4-8 mg) by mouth every 8 hours as needed for nausea 20 tablet 1     potassium chloride ER (K-DUR/KLOR-CON M) 10 MEQ CR tablet Take 2 tablets (20 mEq) by mouth daily 180 tablet 3     BP Readings from Last 3 Encounters:   01/17/20 (!) 145/75   12/20/19 138/84   11/25/19 (!) 160/100    Wt Readings from Last 3 Encounters:   01/17/20 132.5 kg (292 lb)   12/20/19 131.1 kg (289 lb)   11/25/19 136.1 kg (300 lb)           Reviewed and updated as needed this visit by Provider         Review of Systems   ROS COMP: Constitutional, HEENT, cardiovascular, pulmonary, gi and gu systems are negative, except as otherwise noted.      Objective    BP (!) 145/75 (BP Location: Left arm, Patient Position: Sitting, Cuff Size: Adult Large)   Pulse 98   Temp 98.5  F (36.9  C) (Oral)   Resp 16   Ht 1.765 m (5' 9.5\")   Wt 132.5 kg (292 lb)   LMP  (LMP Unknown)   SpO2 99%   Breastfeeding Yes   BMI 42.50 kg/m    Body mass index is 42.5 kg/m .  Physical Exam   GENERAL: healthy, alert and no distress  NECK: no adenopathy, no asymmetry, masses, or scars and thyroid normal to palpation  RESP: lungs clear to auscultation - no rales, rhonchi or wheezes  CV: regular rate and rhythm, normal S1 S2, no S3 or S4, no murmur, click or rub, no peripheral edema and peripheral pulses strong  ABDOMEN: soft, nontender, no hepatosplenomegaly, no masses and bowel sounds normal  MS: no gross musculoskeletal defects noted, no " edema  SKIN: no suspicious lesions or rashes  NEURO: Normal strength and tone, mentation intact and speech normal  BACK: no CVA tenderness, no paralumbar tenderness  PSYCH: mentation appears normal, affect normal/bright  LYMPH: no cervical adenopathy    Diagnostic Test Results:  Labs reviewed in Epic  none         Assessment & Plan     1. Strain of right hamstring  Resolved.  Ok to return to work on Monday 1/20/2020. Continue HEP for ROM, strengthening. No need for further physical therapy at this time.         See Patient Instructions    No follow-ups on file.    MELANIA Domingo [facility]

## 2020-01-17 NOTE — PATIENT INSTRUCTIONS
At [facility], we strive to deliver an exceptional experience to you, every time we see you.  If you receive a survey in the mail, please send us back your thoughts. We really do value your feedback.    Based on your medical history, these are the current health maintenance/preventive care services that you are due for (some may have been done at this visit.)  Health Maintenance Due   Topic Date Due     PREVENTIVE CARE VISIT  04/11/2018         Suggested websites for health information:  Www.[place].org : Up to date and easily searchable information on multiple topics.  Www.medlineplus.gov : medication info, interactive tutorials, watch real surgeries online  Www.familydoctor.org : good info from the Academy of Family Physicians  Www.cdc.gov : public health info, travel advisories, epidemics (H1N1)  Www.aap.org : children's health info, normal development, vaccinations  Www.health.[facility].[place].us : [place] dept of health, public health issues in [place], N1N1    Your care team:                            Family Medicine Internal Medicine   MD Triston Vegas MD Shantel Branch-Fleming, MD Katya Georgiev PA-C Nam Ho, MD Pediatrics   BERT Romero, MD Kristin Cooney CNP, MD Deborah Mielke, MD Kim Thein, APRN CNP      Clinic hours: Monday - Thursday 7 am-7 pm; Fridays 7 am-5 pm.   Urgent care: Monday - Friday 11 am-9 pm; Saturday and Sunday 9 am-5 pm.  Pharmacy : Monday -Thursday 8 am-8 pm; Friday 8 am-6 pm; Saturday and Sunday 9 am-5 pm.     Clinic: [phone]   Pharmacy: [phone]

## 2020-01-17 NOTE — LETTER
[address] [address]  Phone: [phone]    January 17, 2020        Mckayla Jeff  [address]  [place] [address]          To whom it may concern:    RE: Mckayla Jeff    Patient was seen and treated today at our clinic. She can return to work on Monday, January 20,2020 without restrictions.  She'll continue with her home exercise program.    Please contact me for questions or concerns.      Sincerely,        MELANIA Domingo CNP

## 2020-01-28 ENCOUNTER — TELEPHONE (OUTPATIENT)
Dept: FAMILY MEDICINE | Facility: CLINIC | Age: 35
End: 2020-01-28

## 2020-01-28 NOTE — TELEPHONE ENCOUNTER
Received Health Care Provider Report form via fax. Placed in Dinah Wilma's basket.  Brittany Diallo MA  [facility]  2nd Floor  Primary Care

## 2020-02-17 NOTE — TELEPHONE ENCOUNTER
Faxed signed form to [employer], [phone], right fax confirmed at 2:20 pm today, 2/17/2020. Copy to TC and abstracting.  Brittany Diallo [facility]  2nd Floor  Primary Care

## 2020-02-24 ENCOUNTER — OFFICE VISIT (OUTPATIENT)
Dept: FAMILY MEDICINE | Facility: CLINIC | Age: 35
End: 2020-02-24
Payer: COMMERCIAL

## 2020-02-24 VITALS
DIASTOLIC BLOOD PRESSURE: 86 MMHG | TEMPERATURE: 98.3 F | BODY MASS INDEX: 40.17 KG/M2 | SYSTOLIC BLOOD PRESSURE: 128 MMHG | WEIGHT: 280.6 LBS | HEART RATE: 79 BPM | OXYGEN SATURATION: 99 % | HEIGHT: 70 IN

## 2020-02-24 DIAGNOSIS — J10.1 INFLUENZA A: Primary | ICD-10-CM

## 2020-02-24 PROCEDURE — 99213 OFFICE O/P EST LOW 20 MIN: CPT | Performed by: FAMILY MEDICINE

## 2020-02-24 ASSESSMENT — MIFFLIN-ST. JEOR: SCORE: 2040.1

## 2020-02-24 ASSESSMENT — PAIN SCALES - GENERAL: PAINLEVEL: NO PAIN (0)

## 2020-02-24 NOTE — PATIENT INSTRUCTIONS
At [facility], we strive to deliver an exceptional experience to you, every time we see you.  If you receive a survey in the mail, please send us back your thoughts. We really do value your feedback.    Based on your medical history, these are the current health maintenance/preventive care services that you are due for (some may have been done at this visit.)  Health Maintenance Due   Topic Date Due     PREVENTIVE CARE VISIT  04/11/2018         Suggested websites for health information:  Www.[place].org : Up to date and easily searchable information on multiple topics.  Www.medlineplus.gov : medication info, interactive tutorials, watch real surgeries online  Www.familydoctor.org : good info from the Academy of Family Physicians  Www.cdc.gov : public health info, travel advisories, epidemics (H1N1)  Www.aap.org : children's health info, normal development, vaccinations  Www.health.[facility].[place].us : [place] dept of health, public health issues in [place], N1N1    Your care team:                            Family Medicine Internal Medicine   MD Triston Vegas MD Shantel Branch-Fleming, MD Katya Georgiev PA-C Nam Ho, MD Pediatrics   BERT Romero, MD Kristin Cooney CNP, MD Deborah Mielke, MD Kim Thein, APRN CNP      Clinic hours: Monday - Thursday 7 am-7 pm; Fridays 7 am-5 pm.   Urgent care: Monday - Friday 11 am-9 pm; Saturday and Sunday 9 am-5 pm.  Pharmacy : Monday -Thursday 8 am-8 pm; Friday 8 am-6 pm; Saturday and Sunday 9 am-5 pm.     Clinic: [phone]   Pharmacy: [phone]

## 2020-02-24 NOTE — PROGRESS NOTES
"Subjective     Mckayla Jeff is a 34 year old female who presents to clinic today for the following health issues:    HPI   Follow up to see if flu is gone.  Seen 2/18/20 in [facility] ED and tested positive for Influenza A after being sick for 3 - 4 days.  No fevers or chills but still having productive cough.  No recent ibuprofen or tylenol intake.        Reviewed and updated as needed this visit by Provider  Tobacco  Allergies  Meds  Problems  Med Hx  Surg Hx  Fam Hx         Review of Systems   ROS COMP: Constitutional, HEENT, cardiovascular, pulmonary, gi and gu systems are negative, except as otherwise noted.      Objective    /86 (BP Location: Left arm, Patient Position: Chair, Cuff Size: Adult Large)   Pulse 79   Temp 98.3  F (36.8  C) (Oral)   Ht 1.765 m (5' 9.5\")   Wt 127.3 kg (280 lb 9.6 oz)   LMP 02/07/2020 (Approximate)   SpO2 99%   BMI 40.84 kg/m    Body mass index is 40.84 kg/m .  Physical Exam   GENERAL: healthy, alert and no distress  HENT: normal cephalic/atraumatic, ear canals and TM's normal, nasal mucosa edematous , oropharynx clear and oral mucous membranes moist  NECK: no adenopathy, no asymmetry, masses, or scars and thyroid normal to palpation  RESP: lungs clear to auscultation - no rales, rhonchi or wheezes  CV: regular rate and rhythm, normal S1 S2, no S3 or S4, no murmur, click or rub, no peripheral edema and peripheral pulses strong  ABDOMEN: soft, nontender, no hepatosplenomegaly, no masses and bowel sounds normal  MS: no gross musculoskeletal defects noted, no edema    Diagnostic Test Results:  Labs reviewed in Epic        Assessment & Plan     1. Influenza A  Reassured - follow up in 3 - 5 days if not improved       Return in about 3 days (around 2/27/2020), or if symptoms worsen or fail to improve.    Prema Brewer MD  [facility]        "

## 2020-02-24 NOTE — LETTER
February 24, 2020      Mckayla Jeff  [address]  [place] [id number]        To Whom It May Concern:    Mckayla Jeff was seen on 2/24/20.  Please excuse her until 2/25/20 due to illness.        Sincerely,        Prema Brewer MD

## 2020-02-26 PROBLEM — S76.311A STRAIN OF RIGHT HAMSTRING: Status: RESOLVED | Noted: 2019-11-08 | Resolved: 2020-02-26

## 2020-02-26 NOTE — PROGRESS NOTES
Subjective:  HPI  Physical Exam                    Objective:  System    Physical Exam    General     ROS    Assessment/Plan:    DISCHARGE REPORT    Progress reporting period is from 11/8/2019 to 1/6/2020.     SUBJECTIVE  Subjective: Been feeling good.  Not back to work yet.  MD 1/13.   Current Pain level: 1/10   Initial Pain level: 4/10   Changes in function: Yes, see goal flow sheet for change in function   Adverse reactions: None;   ,     Patient has failed to return to therapy so current objective findings are unknown.  The subjective and objective information are from the last SOAP note on this patient.    OBJECTIVE  Objective: Slight tenderness remains with full lumbar flexion.  Denies tenderness with palpation      ASSESSMENT/PLAN  Updated problem list and treatment plan:Diagnosis 1:  R HS strain  Pain -  manual therapy, self management, education, directional preference exercise and home program  Decreased ROM/flexibility - manual therapy and therapeutic exercise  Decreased strength - therapeutic exercise and therapeutic activities  Decreased function - therapeutic activities   STG/LTGs have been met or progress has been made towards goals:  Yes (See Goal flow sheet completed today.)  Assessment of Progress: The patient has met all of their long term goals.  Self Management Plans:  Patient is independent in a home treatment program.    Mckayla continues to require the following intervention to meet STG and LTG's: PT intervention is no longer required to meet STG/LTG.  We will discharge this patient from PT.  Canceled last visits as she returned to full duty at work    Recommendations:  This patient is ready to be discharged from therapy and continue their home treatment program.    Please refer to the daily flowsheet for treatment today, total treatment time and time spent performing 1:1 timed codes.

## 2020-03-12 DIAGNOSIS — I10 ESSENTIAL HYPERTENSION WITH GOAL BLOOD PRESSURE LESS THAN 140/90: ICD-10-CM

## 2020-03-12 NOTE — TELEPHONE ENCOUNTER
Reason for Call:  Medication or medication refill:    Do you use a [facility]?  Name of the pharmacy and phone number for the current request:  [facility] [address] [place], [place] [address], [place] - [address] [facility]     Name of the medication requested: losartan (COZAAR) 100 MG tablet    Can we leave a detailed message on this number? YES    Phone number patient can be reached at: Home number on file [phone] (home)    Best Time: anytime    Call taken on 3/12/2020 at 9:34 AM by Ariel Perez

## 2020-03-12 NOTE — TELEPHONE ENCOUNTER
"Requested Prescriptions   Pending Prescriptions Disp Refills     losartan (COZAAR) 100 MG tablet  Last Written Prescription Date:  11/04/19  Last Fill Quantity: 90,  # refills: 0   Last Office Visit with [facility] or [facility] prescribing provider:  02/24/2020-Ceci Brewer   Future Office Visit:    90 tablet 0     Sig: Take 1 tablet (100 mg) by mouth daily       Angiotensin-II Receptors Failed - 3/12/2020  9:35 AM        Failed - Normal serum potassium on file in past 12 months     Recent Labs   Lab Test 06/25/19  1512   POTASSIUM 3.3*                    Passed - Last blood pressure under 140/90 in past 12 months     BP Readings from Last 3 Encounters:   02/24/20 128/86   01/17/20 (!) 145/75   12/20/19 138/84                 Passed - Recent (12 mo) or future (30 days) visit within the authorizing provider's specialty     Patient has had an office visit with the authorizing provider or a provider within the authorizing providers department within the previous 12 mos or has a future within next 30 days. See \"Patient Info\" tab in inbasket, or \"Choose Columns\" in Meds & Orders section of the refill encounter.              Passed - Medication is active on med list        Passed - Patient is age 18 or older        Passed - No active pregnancy on record        Passed - Normal serum creatinine on file in past 12 months     Recent Labs   Lab Test 06/25/19  1512   CR 0.79             Passed - No positive pregnancy test in past 12 months             "

## 2020-03-14 DIAGNOSIS — I10 ESSENTIAL HYPERTENSION WITH GOAL BLOOD PRESSURE LESS THAN 140/90: ICD-10-CM

## 2020-03-16 RX ORDER — LOSARTAN POTASSIUM 100 MG/1
100 TABLET ORAL DAILY
Qty: 90 TABLET | Refills: 0 | OUTPATIENT
Start: 2020-03-16

## 2020-03-16 NOTE — TELEPHONE ENCOUNTER
Duplicate   [facility] ENCOUNTER  GI CONSULT NOTE    REQUESTING PROVIDER:  SUDHA Keita    REASON FOR REQUEST:  Consultation (consult  Lesa HALEY)     SUBJECTIVE:    Lisa Marx is a 49 year old female with a history of breast cancer and obesity seen today at the request of SUDHA Keita for GERD. Think she has had GERD for the last 5 years but states that she has never been treated. Says she has excessive belching especially with [place] cheese and onions. She is experiencing regurgitation, especially at night if she lies down. Has since of something \"stuck in her throat\". In the last year, is now having issues with swallowing. She notes solid foods feeling stuck in her throat and often have to cough it back up. No issues with liquids. No painful swallowing. Appetite is otherwise fine. Her weight has been trending up. She admits that her diet could be better. She is busy throughout the day and tends to snack, although tries to choose healthy snacks. She tends to get home from work late and will eat a large meal around 7:30. She may then fall asleep around 9 PM. No coffee or soda. She does not get adequate hydration throughout the day and does not exercise. No abdominal pain. No changes in bowel habits. Typically has a formed bowel movement daily. Denies use of NSAIDs.    She was diagnosed with breast cancer in 2014. She underwent right mastectomy and chemotherapy. No radiation. No prior endoscopic evaluation.    Denies: fevers, chills, nausea, vomiting, odynophagia, abdominal pain , melena, blood in stool, anorexia, constipation, diarrhea    Prior endoscopies and work-up: None    MEDICATIONS:       Current Outpatient Medications   Medication Sig Dispense Refill   • Cholecalciferol (VITAMIN D) 2000 units tablet Take 2,000 Units by mouth daily. Take 2,000 units daily, after completing 13 weeks of 50,000 units dose. 90 tablet 4   • Multiple Vitamins-Minerals (MULTIVITAMIN PO) Take  by  mouth daily.       No current facility-administered medications for this visit.      Facility-Administered Medications Ordered in Other Visits   Medication Dose Route Frequency Provider Last Rate Last Dose   • heparin flush 100 UNIT/ML lock flush 500 Units  5 mL Intercatheter PRN Kalee Dow MD   500 Units at 09/21/16 1614       HISTORIES:    ALLERGIES:   Allergen Reactions   • Tegaderm [Gelpad Dressing] RASH     Past Medical History:   Diagnosis Date   • DCIS (ductal carcinoma in situ) of breast 11/11/2014    neg genetic w/u   • Dyspepsia/belching 11/29/2017   • leukopenia 3.6:  recheck yearly/prn 8/22/2017   • Malignant neoplasm (CMS/HCC)     rt breast: dr. Del Valle   • Obesity, unspecified    • Vitamin D deficiency 3/20/2015     Past Surgical History:   Procedure Laterality Date   • Breast biopsy Right 11/3/2014   • Cervix surgery      cervical dysplasia in her 20's   • Mastectomy, partial Right 12/2014     SOCIAL HISTORY:  Patient is . No children. Works as a . Does not smoke or drink alcohol.    FAMILY HISTORY:  Negative GI hx    REVIEW OF SYSTEMS:    All other systems are reviewed and are negative except as documented in the history of present illness.    PHYSICAL EXAM:    Vital Signs: Blood pressure 138/88, pulse 86, temperature 97.8 °F (36.6 °C), weight 106 kg, SpO2 100 %.  General: The patient is alert, cooperative, in no acute distress, appears stated age.   Neurologic: Normal mood and affect. Normal speech, gait, and tone.  Head: Normocephalic.  Eyes: Sclera non-icteric. No conjunctival pallor.  Throat: Oropharynx clear.  Neck: Symmetric without swelling or tenderness, trachea midline. No thyromegaly. No cervical or supraclavicular lymphadenopathy.  Respiratory: All lung fields clear to auscultation.  Cardiovascular: Regular rate and rhythm.  Gastrointestinal:  Soft, rounded, nontender, nondistended. Normal bowel sounds.  No hepatomegaly or splenomegaly. No masses. No  rebound or guarding.  Rectal: Deferred.    LABORATORY DATA:    Component      Latest Ref Rng & Units 7/3/2018 8/23/2018   WBC      4.2 - 11.0 K/mcL  3.1 (L)   RBC      4.00 - 5.20 mil/mcL  4.65   HGB      12.0 - 15.5 g/dL  13.1   HCT      36.0 - 46.5 %  40.6   MCV      78.0 - 100.0 fl  87.3   MCH      26.0 - 34.0 pg  28.2   MCHC      32.0 - 36.5 g/dL  32.3   RDW-CV      11.0 - 15.0 %  12.7   PLT      140 - 450 K/mcL  183   DIFFERENTIAL TYPE        AUTOMATED DIFFERENTIAL   Neutrophil      %  41   LYMPH      %  50   MONO      %  8   EOSIN      %  1   BASO      %  0   Absolute Neutrophil      1.8 - 7.7 K/mcL  1.3 (L)   Absolute Lymph      1.0 - 4.8 K/mcL  1.5   Absolute Mono      0.3 - 0.9 K/mcL  0.3   Absolute Eos      0.1 - 0.5 K/mcL  0.0 (L)   Absolute Baso      0.0 - 0.3 K/mcL  0.0   Fasting Status      hrs ?    Sodium      135 - 145 mmol/L 142    Potassium      3.4 - 5.1 mmol/L 4.1    Chloride      98 - 107 mmol/L 105    CO2      21 - 32 mmol/L 31    ANION GAP      10 - 20 mmol/L 10    Glucose      65 - 99 mg/dL 80    BUN      6 - 20 mg/dL 17    Creatinine      0.51 - 0.95 mg/dL 0.87    GFR Estimate,        90    GFR Estimate, Non        78    BUN/CREATININE RATIO      7 - 25 20    CALCIUM      8.4 - 10.2 mg/dL 8.9    TOTAL BILIRUBIN      0.2 - 1.0 mg/dL 0.4    AST/SGOT      <38 Units/L 19    ALT/SGPT      <79 Units/L 17    ALK PHOSPHATASE      45 - 117 Units/L 77    TOTAL PROTEIN      6.4 - 8.2 g/dL 7.4    Albumin      3.6 - 5.1 g/dL 3.4 (L)    GLOBULIN      2.0 - 4.0 g/dL 4.0    A/G Ratio, Serum      1.0 - 2.4 0.9 (L)    TSH      0.350 - 5.000 mcUnits/mL  0.715     IMAGING STUDIES:    Abdominal CT 3/17/2017  IMPRESSION:     1.  At least 16 nonenhancing homogeneous low-density lesions scattered  throughout the liver remain relatively unchanged dating back to 3/24/2015  and are believed to most likely represent cysts.   2.  Uncomplicated colonic diverticulosis.  3.  Suspected  pedunculated uterine fibroid. This can be better  characterized by ultrasound.  4.  Spondylolisthesis of L5 on S1.    ASSESSMENT:    1. Oropharyngeal dysphagia  2. GERD  3. Colon cancer screening    PLAN/ RECOMMENDATIONS:  Lisa Marx is a 49 year old female who presents for consultation of GERD. Has had long-standing reflux for many years treatment. Now with concerns of dysphagia and globus sensation. Otherwise has no significant abdominal pain or changes in appetite. No melena. Abdominal exam today is benign. I discussed with patient today that likely she has GERD possibly some underlying esophagitis. In lieu of her symptoms, have recommended EGD under Mac sedation for further evaluation. Also recommended screening colonoscopy at that time. Risks of these procedures were discussed with the patient including bleeding, infection, perforation, and cardiopulmonary compromise. In the meantime, recommended starting a PPI, however, patient states she \"does not like to take medications\". She is concerned about side effects. Alternatively recommended an over-the-counter H2 blocker twice daily and she is agreeable to this. Additional antireflux measures provided. Counseled on weight loss. Encouraged patient to call with any further questions or concerns. Further recommendations and follow-up based on test findings and clinical course.    The patient indicated understanding of the diagnosis and agreed with the plan of care.      A copy of this note was sent to the referring provider. Thank you for involving me in the care of this patient.    This patient was seen and examined under the supervision of Surinder Rosado MD.    CHIRAG Barnhart  09/26/18 @ 4:48 PM

## 2020-03-16 NOTE — TELEPHONE ENCOUNTER
"Requested Prescriptions   Pending Prescriptions Disp Refills     losartan (COZAAR) 100 MG tablet [Pharmacy Med Name: Losartan Potassium 100 MG Oral Tablet] 90 tablet 0     Sig: Take 1 tablet by mouth once daily   Last Written Prescription Date:  11/4/19  Last Fill Quantity: 90,  # refills: 0   Last office visit: 2/24/2020 with prescribing provider:     Future Office Visit:        Angiotensin-II Receptors Failed - 3/14/2020  9:41 AM        Failed - Normal serum potassium on file in past 12 months     Recent Labs   Lab Test 06/25/19  1512   POTASSIUM 3.3*                    Passed - Last blood pressure under 140/90 in past 12 months     BP Readings from Last 3 Encounters:   02/24/20 128/86   01/17/20 (!) 145/75   12/20/19 138/84                 Passed - Recent (12 mo) or future (30 days) visit within the authorizing provider's specialty     Patient has had an office visit with the authorizing provider or a provider within the authorizing providers department within the previous 12 mos or has a future within next 30 days. See \"Patient Info\" tab in inbasket, or \"Choose Columns\" in Meds & Orders section of the refill encounter.              Passed - Medication is active on med list        Passed - Patient is age 18 or older        Passed - No active pregnancy on record        Passed - Normal serum creatinine on file in past 12 months     Recent Labs   Lab Test 06/25/19  1512   CR 0.79       Ok to refill medication if creatinine is low          Passed - No positive pregnancy test in past 12 months           hydrochlorothiazide (HYDRODIURIL) 25 MG tablet [Pharmacy Med Name: hydroCHLOROthiazide 25 MG Oral Tablet] 90 tablet 0     Sig: Take 1 tablet by mouth once daily   Last Written Prescription Date:  11/4/19  Last Fill Quantity: 90,  # refills: 0   Last office visit: 2/24/2020 with prescribing provider:     Future Office Visit:        Diuretics (Including Combos) Protocol Failed - 3/14/2020  9:41 AM        Failed - Normal " "serum potassium on file in past 12 months     Recent Labs   Lab Test 06/25/19  1512   POTASSIUM 3.3*                    Passed - Blood pressure under 140/90 in past 12 months     BP Readings from Last 3 Encounters:   02/24/20 128/86   01/17/20 (!) 145/75   12/20/19 138/84                 Passed - Recent (12 mo) or future (30 days) visit within the authorizing provider's specialty     Patient has had an office visit with the authorizing provider or a provider within the authorizing providers department within the previous 12 mos or has a future within next 30 days. See \"Patient Info\" tab in inbasket, or \"Choose Columns\" in Meds & Orders section of the refill encounter.              Passed - Medication is active on med list        Passed - Patient is age 18 or older        Passed - No active pregancy on record        Passed - Normal serum creatinine on file in past 12 months     Recent Labs   Lab Test 06/25/19  1512   CR 0.79              Passed - Normal serum sodium on file in past 12 months     Recent Labs   Lab Test 06/25/19  1512                 Passed - No positive pregnancy test in past 12 months             "

## 2020-03-17 RX ORDER — HYDROCHLOROTHIAZIDE 25 MG/1
TABLET ORAL
Qty: 90 TABLET | Refills: 0 | Status: SHIPPED | OUTPATIENT
Start: 2020-03-17 | End: 2020-06-11

## 2020-03-17 RX ORDER — LOSARTAN POTASSIUM 100 MG/1
TABLET ORAL
Qty: 90 TABLET | Refills: 0 | Status: SHIPPED | OUTPATIENT
Start: 2020-03-17 | End: 2020-06-11

## 2020-04-13 ENCOUNTER — RESULTS ONLY (OUTPATIENT)
Dept: LAB | Age: 35
End: 2020-04-13

## 2020-04-13 ENCOUNTER — OFFICE VISIT (OUTPATIENT)
Dept: URGENT CARE | Facility: URGENT CARE | Age: 35
End: 2020-04-13
Payer: COMMERCIAL

## 2020-04-13 DIAGNOSIS — Z53.9 ERRONEOUS ENCOUNTER--DISREGARD: Primary | ICD-10-CM

## 2020-04-13 NOTE — PATIENT INSTRUCTIONS
[facility] Employee Health team will receive your Covid 19 test results in the next 1-4 days.  Once your results are received, Employee Health will call you with your test result and discuss your Return to Work timeline and criteria.

## 2020-04-14 LAB
SARS-COV-2 RNA SPEC QL NAA+PROBE: NOT DETECTED
SPECIMEN SOURCE: NORMAL

## 2020-04-20 ENCOUNTER — VIRTUAL VISIT (OUTPATIENT)
Dept: FAMILY MEDICINE | Facility: CLINIC | Age: 35
End: 2020-04-20
Payer: COMMERCIAL

## 2020-04-20 ENCOUNTER — TELEPHONE (OUTPATIENT)
Dept: FAMILY MEDICINE | Facility: CLINIC | Age: 35
End: 2020-04-20

## 2020-04-20 DIAGNOSIS — J01.90 ACUTE SINUSITIS WITH SYMPTOMS > 10 DAYS: ICD-10-CM

## 2020-04-20 DIAGNOSIS — J06.9 UPPER RESPIRATORY TRACT INFECTION, UNSPECIFIED TYPE: Primary | ICD-10-CM

## 2020-04-20 PROCEDURE — 99213 OFFICE O/P EST LOW 20 MIN: CPT | Mod: TEL | Performed by: PHYSICIAN ASSISTANT

## 2020-04-20 NOTE — TELEPHONE ENCOUNTER
S-(situation): patient developed sinus symptoms    B-(background): symptoms started to develop wed    A-(assessment): symptoms started Wed. , headache then spread into face, now radiating into ears bilateral, cold air exacerbates symptoms, runny nose, yellow/green mucous and is coming from nasal passage way,. Fever few days ago had fever and was tested for COVID  And test was negative, nasal passage ways,       R-(recommendations): phone visit today. Assisted her in scheduling phone apt today    Samanta Chi RN

## 2020-04-20 NOTE — TELEPHONE ENCOUNTER
Reason for call:  Symptom   Symptom or request: poss sinus infection    Duration (how long have symptoms been present): days  Have you been treated for this before? No    Additional comments: patient did get tested for Covid 19 and it was negative  Her job wont let her come back with her symptoms  Call to discuss treatment    Phone number to reach patient: [phone]     best Time:  any    Can we leave a detailed message on this number?  YES    Travel screening: Not Applicable

## 2020-04-20 NOTE — PATIENT INSTRUCTIONS
At [facility], we strive to deliver an exceptional experience to you, every time we see you. If you receive a survey, please complete it as we do value your feedback.  If you have MyChart, you can expect to receive results automatically within 24 hours of their completion.  Your provider will send a note interpreting your results as well.   If you do not have MyChart, you should receive your results in about a week by mail.    Your care team:                            Family Medicine Internal Medicine   MD Triston Vegas MD Shantel Branch-Fleming, MD Katya Georgiev PA-C Megan Hill, APRN CNP    James Polanco, MD Pediatrics   Gregor Brown, PAJose AlfredoC  Wendy Mccullough, CNP MD Corazon Villafana APRN CNP   MD Kristin Menendez MD Deborah Mielke, MD Kim Thein, APRN [facility]      Clinic hours: Monday - Thursday 7 am-7 pm; Fridays 7 am-5 pm.   Urgent care: Monday - Friday 11 am-9 pm; Saturday and Sunday 9 am-5 pm.    Clinic: [phone]       [facility]: Monday - Thursday 8 am - 7 pm; Friday 8 am - 6 pm  [facility]: [phone]     Use www.[email or web address].org for 24/7 diagnosis and treatment of dozens of conditions.      Click here to access your letters.    TRIAL over the counter Phenylepherine (during the day) AND AFRIN (Oxymetazolin) NASAL SPRAY  (for 3 days maximum) FOR 3 DAYS AND IF NOT IMPROVING start the antibiotics.        Trial over the counter symptomatic relief, rest, and drink plenty of fluids. Salt water gargles and nasal lavage may also be helpful.  Return to clinic or Emergency Department for breathing/swallowing problems, fever >105, altered mental status, or worsening general condition.      Viral Respiratory Illness:  You have an Upper Respiratory Illness (URI) caused by a virus. This illness is contagious during the first few days. It is spread through the air by coughing and sneezing or by direct contact  (touching the sick person and then touching your own eyes, nose or mouth). Most viral illnesses go away within 7-10 days with rest and simple home remedies. Sometimes, the illness may last for several weeks. Antibiotics will not kill a virus and are generally not prescribed for this condition.  Home Care:  1) If symptoms are severe, rest at home for the first 2-3 days. When you resume activity, don't let yourself get too tired.  2) Avoid being exposed to cigarette smoke (yours or others ).  3) Tylenol (acetaminophen) or ibuprofen (Advil, Motrin) will help fever, muscle aching and headache. (Persons under 18 with fever should not take aspirin since this may cause liver damage.)  4) Your appetite may be poor, so a light diet is fine. Avoid dehydration by drinking 6-8 glasses of fluids per day (water, soft, drinks, juices, tea, soup, etc.). Extra fluids will help loosen secretions in the nose and lungs.  5) Over-the-counter cold medicines will not shorten the length of time you re sick, but they may be helpful for the following symptoms: cough (Robitussin DM); sore throat (Chloraseptic lozenges or spray); nasal and sinus congestion (Actifed, Sudafed, Chlortrimeton).  Follow Up  with your doctor or as advised if you don t improve over the next week.  Get Prompt Medical Attention  if any of the following occur:  -- Cough with lots of colored sputum (mucus) or blood in your sputum  -- Chest pain, shortness of breath, wheezing or have trouble breathing  -- Severe headache; face, neck or ear pain  -- Fever over 100.4  F (38.0  C) for more than three days  -- You can t swallow due to throat pain    [id number] [employer], [address] [place] [address], [place] [address]. All rights reserved. This information is not intended as a substitute for professional medical care. Always follow your healthcare professional's instructions.

## 2020-04-20 NOTE — LETTER
[address] [address]  Phone: [phone]    April 20, 2020        Mckayla Washington  [address] [facility] [address]  [facility] [address]          To whom it may concern:    RE: Mckayla Jeff    Patient evaluated today.  Patient excused from work until Monday the 27th.  .    Patient may return to work without restrictions 4/27/2020        Please contact me for questions or concerns.      Sincerely,        QUEENIE Sosa

## 2020-04-20 NOTE — PROGRESS NOTES
"Mckayla Jeff is a 34 year old female who is being evaluated via a billable telephone visit.      The patient has been notified of following:     \"This telephone visit will be conducted via a call between you and your physician/provider. We have found that certain health care needs can be provided without the need for a physical exam.  This service lets us provide the care you need with a short phone conversation.  If a prescription is necessary we can send it directly to your pharmacy.  If lab work is needed we can place an order for that and you can then stop by our lab to have the test done at a later time.    Telephone visits are billed at different rates depending on your insurance coverage. During this emergency period, for some insurers they may be billed the same as an in-person visit.  Please reach out to your insurance provider with any questions.    If during the course of the call the physician/provider feels a telephone visit is not appropriate, you will not be charged for this service.\"    Patient has given verbal consent for Telephone visit?  Yes    How would you like to obtain your AVS? Lola Segura     Mckayla Jeff is a 34 year old female who presents to clinic today for the following health issues:    patient developed sinus symptoms 7 days ago , headache then spread into face, now radiating into ears bilateral, cold air exacerbates symptoms, runny nose, yellow/green mucous and is coming from nasal passage way,. Fever first few days .   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  NO SOb when breathing through mouth.  A bit of a cough from PND.    Sudafed over the counter was helpful.  Only took it once.      Reviewed and updated as needed this visit by Provider         Review of Systems   ROS COMP: CONSTITUTIONAL:POSITIVE  for fever    ENT/MOUTH: POSITIVE for sinus pressure  RESP: as cough       Objective "   Reported vitals:  There were no vitals taken for this visit.   healthy, alert and no distress  PSYCH: Alert and oriented times 3; coherent speech, normal   rate and volume, able to articulate logical thoughts, able   to abstract reason, no tangential thoughts, no hallucinations   or delusions  Her affect is normal  RESP: No cough, no audible wheezing, able to talk in full sentences  Remainder of exam unable to be completed due to telephone visits    Diagnostic Test Results:  none         Assessment/Plan:  1. Acute sinusitis with symptoms > 10 days  Delayed as needed ABXs, see AVS  - amoxicillin-clavulanate (AUGMENTIN) 875-125 MG tablet; Take 1 tablet by mouth 2 times daily  Dispense: 20 tablet; Refill: 0    2. Upper respiratory tract infection, unspecified type         Return in about 2 weeks (around 5/4/2020), or if symptoms worsen or fail to improve.      Phone call duration:  8 minutes    QUEENIE Sosa

## 2020-04-29 ENCOUNTER — VIRTUAL VISIT (OUTPATIENT)
Dept: FAMILY MEDICINE | Facility: CLINIC | Age: 35
End: 2020-04-29
Payer: COMMERCIAL

## 2020-04-29 DIAGNOSIS — H10.33 ACUTE CONJUNCTIVITIS OF BOTH EYES, UNSPECIFIED ACUTE CONJUNCTIVITIS TYPE: Primary | ICD-10-CM

## 2020-04-29 PROCEDURE — 99213 OFFICE O/P EST LOW 20 MIN: CPT | Mod: TEL | Performed by: NURSE PRACTITIONER

## 2020-04-29 RX ORDER — POLYMYXIN B SULFATE AND TRIMETHOPRIM 1; 10000 MG/ML; [USP'U]/ML
1-2 SOLUTION OPHTHALMIC EVERY 4 HOURS
Qty: 1 BOTTLE | Refills: 0 | Status: SHIPPED | OUTPATIENT
Start: 2020-04-29 | End: 2020-05-06

## 2020-04-29 NOTE — PATIENT INSTRUCTIONS
At [facility], we strive to deliver an exceptional experience to you, every time we see you. If you receive a survey, please complete it as we do value your feedback.  If you have MyChart, you can expect to receive results automatically within 24 hours of their completion.  Your provider will send a note interpreting your results as well.   If you do not have MyChart, you should receive your results in about a week by mail.    Your care team:                            Family Medicine Internal Medicine   MD Triston Vegas MD Shantel Branch-Fleming, MD Katya Georgiev PA-C Megan Hill, APRN CNP    James Polanco, MD Pediatrics   Gregor Brown, PADEJA Mccullough, MD Corazon Mena APRN CNP   MD Kristin Menendez MD Deborah Mielke, MD Kim Thein, APRN [facility]      Clinic hours: Monday - Thursday 7 am-7 pm; Fridays 7 am-5 pm.   Urgent care: Monday - Friday 11 am-9 pm; Saturday and Sunday 9 am-5 pm.    Clinic: [phone]       [facility]: Monday - Thursday 8 am - 7 pm; Friday 8 am - 6 pm  [facility]: [phone]     Use www.[email or web address].org for 24/7 diagnosis and treatment of dozens of conditions.    Patient Education     Conjunctivitis, Nonspecific    The membrane that covers the white part of your eye (the conjunctiva) is inflamed. Inflammation happens when your body responds to an injury, allergic reaction, infection, or illness. Symptoms of inflammation in the eye may include redness, irritation, itching, swelling, or burning. These symptoms should go away within the next 24 hours. Conjunctivitis may be related to a particle that was in your eye. If so, it may wash out with your tears or irrigation treatment. Being exposed to liquid chemicals or fumes may also cause this reaction.   Home care    Apply a cold pack over the eye for 20 minutes at a time. This will reduce pain. To make a cold pack, put ice  cubes in a plastic bag that seals at the top. Wrap the bag in a clean, thin towel or cloth.    Artificial tears may be prescribed to reduce irritation or redness.  These should be used 3 to 4 times a day.    You may use acetaminophen or ibuprofen to control pain, unless another medicine was prescribed. (Note: If you have chronic liver or kidney disease, or if you have ever had a stomach ulcer or gastrointestinal bleeding, talk with your healthcare provider before using these medicines.)  Follow-up care  Follow up with your healthcare provider, or as advised.  When to seek medical advice  Call your healthcare provider right away if any of these occur:    Increased eyelid swelling    Increased eye pain    Increased redness or drainage from the eye    Increased blurry vision or increased sensitivity to light    Failure of normal vision to return within 24 to 48 hours  Date Last Reviewed: 7/1/2017 2000-2019 [employer]. [address] [place] [address]. All rights reserved. This information is not intended as a substitute for professional medical care. Always follow your healthcare professional's instructions.

## 2020-04-29 NOTE — PROGRESS NOTES
"Mckayla Jeff is a 34 year old female who is being evaluated via a billable phone visit.      The patient has been notified of following:      \"This telephone visit will be conducted via a call between you and your physician/provider. We have found that certain health care needs can be provided without the need for a physical exam.  This service lets us provide the care you need with a short phone conversation.  If a prescription is necessary we can send it directly to your pharmacy.  If lab work is needed we can place an order for that and you can then stop by our lab to have the test done at a later time.     Telephone visits are billed at different rates depending on your insurance coverage. During this emergency period, for some insurers they may be billed the same as an in-person visit.  Please reach out to your insurance provider with any questions.     If during the course of the call the physician/provider feels a telephone visit is not appropriate, you will not be charged for this service.\"     Patient has given verbal consent for Telephone visit?  Yes     How would you like to obtain your AVS? Lola Segura     Mckayla Jeff is a 34 year old female who presents to clinic today for the following health issues:    HPI  Eye(s) Problem  Onset: about 2 days    Description:   Location: right  Pain: YES  Redness: no    Accompanying Signs & Symptoms:  Discharge/mattering: YES-whitish discharge  Swelling: YES  Visual changes: no  Fever: no  Nasal Congestion: YES  Bothered by bright lights: no    History:   Trauma: no   Foreign body exposure: no    Precipitating factors:   Wearing contacs: no    Alleviating factors:  Improved by: none  No ill contacts, has had URI in last week, continues to have nasal congestion (clear to white), was given 10 day course of Augmentin on 4/20/2020. NO fever or chills, facial pain, co, shortness of breath, palpitations, nausea/vomiting, no skin rashes.  Therapies " Tried and outcome: Heat   changing to phone visit due to technology issues- patient unable to connect to [employer]..        Patient Active Problem List   Diagnosis     Essential hypertension with goal blood pressure less than 140/90     CARDIOVASCULAR SCREENING; LDL GOAL LESS THAN 160     PCOS (polycystic ovarian syndrome)     Obesity, Class III, BMI 40-49.9 (morbid obesity) (H)     Synovitis of knee     Internal derangement of knee     Adjustment disorder with mixed anxiety and depressed mood     Health Care Home     Moderate recurrent major depression (H)     Other chest pain     Past Surgical History:   Procedure Laterality Date     NO HISTORY OF SURGERY         Social History     Tobacco Use     Smoking status: Never Smoker     Smokeless tobacco: Never Used   Substance Use Topics     Alcohol use: No     Family History   Problem Relation Age of Onset     Hypertension Mother      Obesity Mother      Depression Mother      Cancer - colorectal Father      Diabetes Maternal Aunt      Depression Maternal Aunt      Asthma No family hx of      Cancer No family hx of      Blood Disease No family hx of      Neurologic Disorder No family hx of      Eye Disorder No family hx of      Thyroid Disease No family hx of      Heart Disease No family hx of      Lipids No family hx of          Current Outpatient Medications   Medication Sig Dispense Refill     amoxicillin-clavulanate (AUGMENTIN) 875-125 MG tablet Take 1 tablet by mouth 2 times daily 20 tablet 0     citalopram (CELEXA) 10 MG tablet Take 1 tablet (10 mg) by mouth daily 30 tablet 1     hydrochlorothiazide (HYDRODIURIL) 25 MG tablet Take 1 tablet by mouth once daily 90 tablet 0     losartan (COZAAR) 100 MG tablet Take 1 tablet by mouth once daily 90 tablet 0     losartan-hydrochlorothiazide (HYZAAR) 100-25 MG tablet Take 1 tablet by mouth daily  3     metFORMIN (GLUCOPHAGE) 500 MG tablet Take 1 tablet (500 mg) by mouth 2 times daily (with meals) 180 tablet 3      "ondansetron (ZOFRAN ODT) 4 MG ODT tab Take 1-2 tablets (4-8 mg) by mouth every 8 hours as needed for nausea 20 tablet 1     potassium chloride ER (K-DUR/KLOR-CON M) 10 MEQ CR tablet Take 2 tablets (20 mEq) by mouth daily 180 tablet 3     trimethoprim-polymyxin b (POLYTRIM) 19696-7.1 UNIT/ML-% ophthalmic solution Place 1-2 drops into both eyes every 4 hours for 7 days 1 Bottle 0     BP Readings from Last 3 Encounters:   02/24/20 128/86   01/17/20 (!) 145/75   12/20/19 138/84    Wt Readings from Last 3 Encounters:   02/24/20 127.3 kg (280 lb 9.6 oz)   01/17/20 132.5 kg (292 lb)   12/20/19 131.1 kg (289 lb)                    Reviewed and updated as needed this visit by Provider         Review of Systems   ROS COMP: Constitutional, HEENT, cardiovascular, pulmonary, gi and gu systems are negative, except as otherwise noted.      Objective    There were no vitals taken for this visit.  Estimated body mass index is 40.84 kg/m  as calculated from the following:    Height as of 2/24/20: 1.765 m (5' 9.5\").    Weight as of 2/24/20: 127.3 kg (280 lb 9.6 oz).  Physical Exam     GENERAL: alert and no distress  RESP: no audible wheezing, cough, speaking in full sentences  PSYCH: mentation appears normal, affect normal/bright, judgement and insight intact, normal speech and appearance well-groomed      Diagnostic Test Results:  Labs reviewed in Epic  none         Assessment & Plan     1. Acute conjunctivitis of both eyes, unspecified acute conjunctivitis type  Treating with Polytrim for7 days, home care reviewed, return to clinic if not improved, new, or worsening symptoms.   - trimethoprim-polymyxin b (POLYTRIM) 80722-1.1 UNIT/ML-% ophthalmic solution; Place 1-2 drops into both eyes every 4 hours for 7 days  Dispense: 1 Bottle; Refill: 0       Work on weight loss  Regular exercise  See Patient Instructions    No follow-ups on file.    MELANIA Domingo [facility]      Video-Visit Details    Type of " service:  Video Visit  Phone call start time 11:20  Phone call End Time 11:25   Originating Location (pt. Location):     Distant Location (provider location):  [facility]     Mode of Communication:  Video Conference via [employer]    No follow-ups on file.       MELANIA Domingo CNP

## 2020-06-05 ENCOUNTER — PATIENT OUTREACH (OUTPATIENT)
Dept: CARE COORDINATION | Facility: CLINIC | Age: 35
End: 2020-06-05

## 2020-06-05 DIAGNOSIS — R07.89 OTHER CHEST PAIN: Primary | ICD-10-CM

## 2020-06-05 NOTE — PROGRESS NOTES
Clinic Care Coordination Contact  Community Health Worker Initial Outreach         Patient accepts CC: No, Patient said she was doing okay right now.     The Clinic Community Health Worker spoke with the patient today at the request of the PCP to discuss possible Clinic Care Coordination enrollment. The service was described to the patient and immediate needs were discussed. The patient declined enrollment at this time. The PCP is encouraged to refer in the future if the patient's needs change.      CHW did send introduction letter to patient via [employer].      Nayely MYERS Community Health Worker  Clinic Care Coordination  [facility] [place]  Phone: [phone]          Reason for Referral: Utilization Concern  Additional pertinent details: Patient with frequent ED visits and elevated risk of readmission. Meets criteria for Care Coordination, and care coordination outreach requested by health plan

## 2020-06-05 NOTE — LETTER
[facility] CARE COORDINATION  [address] [place] [id number]  June 5, 2020    Mckayla Jeff  [address] [address]      Dear Mckayla,    I am a clinic community health worker who works with MELANIA Domingo CNP at [facility]. I wanted to thank you for spending the time to talk with me.  Below is a description of clinic care coordination and how I can further assist you.      The clinic care coordination team is made up of a registered nurse,  and community health worker who understand the health care system. The goal of clinic care coordination is to help you manage your health and improve access to the health care system in the most efficient manner. The team can assist you in meeting your health care goals by providing education, coordinating services, strengthening the communication among your providers and supporting you with any resource needs.    Please feel free to contact me at [phone] with any questions or concerns. We are focused on providing you with the highest-quality healthcare experience possible and that all starts with you.     Sincerely,     Nayely MYERS, Community Health Worker  Clinic Care Coordination  [facility] : [place]  Phone: [phone]

## 2020-06-11 ENCOUNTER — OFFICE VISIT (OUTPATIENT)
Dept: FAMILY MEDICINE | Facility: CLINIC | Age: 35
End: 2020-06-11
Payer: COMMERCIAL

## 2020-06-11 VITALS
WEIGHT: 283 LBS | HEART RATE: 82 BPM | HEIGHT: 70 IN | SYSTOLIC BLOOD PRESSURE: 117 MMHG | BODY MASS INDEX: 40.52 KG/M2 | DIASTOLIC BLOOD PRESSURE: 81 MMHG | OXYGEN SATURATION: 100 % | TEMPERATURE: 98.1 F

## 2020-06-11 DIAGNOSIS — N89.8 VAGINAL ITCHING: Primary | ICD-10-CM

## 2020-06-11 DIAGNOSIS — Z00.00 ROUTINE HISTORY AND PHYSICAL EXAMINATION OF ADULT: ICD-10-CM

## 2020-06-11 DIAGNOSIS — B96.89 BV (BACTERIAL VAGINOSIS): ICD-10-CM

## 2020-06-11 DIAGNOSIS — Z13.6 CARDIOVASCULAR SCREENING; LDL GOAL LESS THAN 130: ICD-10-CM

## 2020-06-11 DIAGNOSIS — E87.6 HYPOPOTASSEMIA: ICD-10-CM

## 2020-06-11 DIAGNOSIS — N76.0 BV (BACTERIAL VAGINOSIS): ICD-10-CM

## 2020-06-11 DIAGNOSIS — F33.1 MODERATE RECURRENT MAJOR DEPRESSION (H): ICD-10-CM

## 2020-06-11 DIAGNOSIS — N39.3 STRESS INCONTINENCE OF URINE: ICD-10-CM

## 2020-06-11 DIAGNOSIS — I10 ESSENTIAL HYPERTENSION WITH GOAL BLOOD PRESSURE LESS THAN 140/90: ICD-10-CM

## 2020-06-11 DIAGNOSIS — E66.01 OBESITY, CLASS III, BMI 40-49.9 (MORBID OBESITY) (H): ICD-10-CM

## 2020-06-11 LAB
ALBUMIN UR-MCNC: NEGATIVE MG/DL
ANION GAP SERPL CALCULATED.3IONS-SCNC: 1 MMOL/L (ref 3–14)
APPEARANCE UR: CLEAR
BILIRUB UR QL STRIP: NEGATIVE
BUN SERPL-MCNC: 9 MG/DL (ref 7–30)
CALCIUM SERPL-MCNC: 9 MG/DL (ref 8.5–10.1)
CHLORIDE SERPL-SCNC: 108 MMOL/L (ref 94–109)
CHOLEST SERPL-MCNC: 149 MG/DL
CO2 SERPL-SCNC: 30 MMOL/L (ref 20–32)
COLOR UR AUTO: YELLOW
CREAT SERPL-MCNC: 0.8 MG/DL (ref 0.52–1.04)
CREAT UR-MCNC: 271 MG/DL
GFR SERPL CREATININE-BSD FRML MDRD: >90 ML/MIN/{1.73_M2}
GLUCOSE SERPL-MCNC: 87 MG/DL (ref 70–99)
GLUCOSE UR STRIP-MCNC: NEGATIVE MG/DL
HDLC SERPL-MCNC: 47 MG/DL
HGB UR QL STRIP: NEGATIVE
KETONES UR STRIP-MCNC: NEGATIVE MG/DL
LDLC SERPL CALC-MCNC: 77 MG/DL
LEUKOCYTE ESTERASE UR QL STRIP: NEGATIVE
MICROALBUMIN UR-MCNC: 12 MG/L
MICROALBUMIN/CREAT UR: 4.43 MG/G CR (ref 0–25)
NITRATE UR QL: NEGATIVE
NONHDLC SERPL-MCNC: 102 MG/DL
PH UR STRIP: 5.5 PH (ref 5–7)
POTASSIUM SERPL-SCNC: 3.7 MMOL/L (ref 3.4–5.3)
SODIUM SERPL-SCNC: 139 MMOL/L (ref 133–144)
SOURCE: NORMAL
SP GR UR STRIP: 1.02 (ref 1–1.03)
SPECIMEN SOURCE: ABNORMAL
TRIGL SERPL-MCNC: 127 MG/DL
UROBILINOGEN UR STRIP-ACNC: 0.2 EU/DL (ref 0.2–1)
WET PREP SPEC: ABNORMAL

## 2020-06-11 PROCEDURE — 87210 SMEAR WET MOUNT SALINE/INK: CPT | Performed by: NURSE PRACTITIONER

## 2020-06-11 PROCEDURE — 80061 LIPID PANEL: CPT | Performed by: NURSE PRACTITIONER

## 2020-06-11 PROCEDURE — 81003 URINALYSIS AUTO W/O SCOPE: CPT | Performed by: NURSE PRACTITIONER

## 2020-06-11 PROCEDURE — 36415 COLL VENOUS BLD VENIPUNCTURE: CPT | Performed by: NURSE PRACTITIONER

## 2020-06-11 PROCEDURE — 99214 OFFICE O/P EST MOD 30 MIN: CPT | Mod: 25 | Performed by: NURSE PRACTITIONER

## 2020-06-11 PROCEDURE — 99395 PREV VISIT EST AGE 18-39: CPT | Performed by: NURSE PRACTITIONER

## 2020-06-11 PROCEDURE — 82043 UR ALBUMIN QUANTITATIVE: CPT | Performed by: NURSE PRACTITIONER

## 2020-06-11 PROCEDURE — 80048 BASIC METABOLIC PNL TOTAL CA: CPT | Performed by: NURSE PRACTITIONER

## 2020-06-11 RX ORDER — METRONIDAZOLE 500 MG/1
500 TABLET ORAL 2 TIMES DAILY
Qty: 14 TABLET | Refills: 0 | Status: SHIPPED | OUTPATIENT
Start: 2020-06-11 | End: 2020-06-18

## 2020-06-11 RX ORDER — POTASSIUM CHLORIDE 750 MG/1
20 TABLET, EXTENDED RELEASE ORAL DAILY
Qty: 180 TABLET | Refills: 3 | Status: SHIPPED | OUTPATIENT
Start: 2020-06-11 | End: 2021-10-25

## 2020-06-11 RX ORDER — LOSARTAN POTASSIUM 100 MG/1
100 TABLET ORAL DAILY
Qty: 90 TABLET | Refills: 1 | Status: SHIPPED | OUTPATIENT
Start: 2020-06-11 | End: 2021-02-10

## 2020-06-11 RX ORDER — HYDROCHLOROTHIAZIDE 25 MG/1
25 TABLET ORAL DAILY
Qty: 90 TABLET | Refills: 1 | Status: SHIPPED | OUTPATIENT
Start: 2020-06-11 | End: 2021-02-10

## 2020-06-11 ASSESSMENT — MIFFLIN-ST. JEOR: SCORE: 2050.99

## 2020-06-11 NOTE — PATIENT INSTRUCTIONS
Patient Education     Prevention Guidelines, Women Ages 18 to 39  Screening tests and vaccines are an important part of managing your health. A screening test is done to find possible disorders or diseases in people who don't have any symptoms. The goal is to find a disease early so lifestyle changes can be made and you can be watched more closely to reduce the risk of disease, or to detect it early enough to treat it most effectively. Screening tests are not considered diagnostic, but are used to determine if more testing is needed. Health counseling is essential, too. Below are guidelines for these, for women ages 18 to 39. Talk with your healthcare provider to make sure you re up-to-date on what you need.  Screening Who needs it How often   Alcohol misuse All women in this age group At routine exams   Blood pressure All women in this age group Yearly checkup if your blood pressure is normal  Normal blood pressure is less than 120/80 mm Hg  If your blood pressure reading is higher than normal, follow the advice of your healthcare provider   Breast cancer All women in this age group should talk with their healthcare providers about the need for clinical breast exams (CBE)1 Clinical breast exam every 3 years1   Cervical cancer Women ages 21 and older Women between ages 21 and 29 should have a Pap test every 3 years; women between ages 30 and 65 are advised to have a Pap test plus an HPV test every 5 years   Chlamydia Sexually active women ages 25 and younger, and women at increased risk for infection (such as having multiple sex partners) Every year if you're at risk or have symptoms   Depression All women in this age group At routine exams   Type 2 diabetes, prediabetes All women with no symptoms who are overweight or obese and have 1 or more other risk factors for diabetes At least every 3 years. Also, testing for diabetes during pregnancy after the 24th week.    Type 2 diabetes, prediabetes All women diagnosed  with gestational diabetes Lifelong testing every 3 years   Type 2 diabetes All women with prediabetes Every year   Gonorrhea Sexually active women at increased risk for infection At routine exams   Hepatitis C Anyone at increased risk At routine exams   HIV All women should be tested at least once for HIV between the ages of 13 and 64 At routine exams. Those with risk factors for HIV should be tested at least annually.    Obesity All women in this age group At routine exams   Syphilis Women at increased risk for infection should talk with their healthcare provider At routine exams   Tuberculosis Women at increased risk for infection should talk with their healthcare provider Ask your healthcare provider   Vision All women in this age group At least 1 complete exam in your 20s, and 2 in your 30s   Vaccine2 Who needs it How often   Chickenpox (varicella) All women in this age group who have no record of this infection or vaccine 2 doses; the second dose should be given 4 to 8 weeks after the first dose   Hepatitis A Women at increased risk for infection should talk with their healthcare provider 2 doses given at least 6 months apart   Hepatitis B Women at increased risk for infection should talk with their healthcare provider 3 doses over 6 months; second dose should be given 1 month after the first dose; the third dose should be given at least 2 months after the second dose and at least 4 months after the first dose   Haemophilus influenzae Type B (HIB) Women at increased risk for infection should talk with their healthcare provider 1 to 3 doses   Human papillomavirus (HPV) All women in this age group up to age 26 3 doses; the second dose should be given 1 to 2 months after the first dose and the third dose given 6 months after the first dose   Influenza (flu) All women in this age group Once a year   Measles, mumps, rubella (MMR) All women in this age group who have no record of these infections or vaccines 1 or 2  doses   Meningococcal Women at increased risk for infection should talk with their healthcare provider 1 or more doses   Pneumococcal conjugate vaccine (PCV13) and pneumococcal polysaccharide vaccine (PPSV23) Women at increased risk for infection should talk with their healthcare provider PCV13: 1 dose ages 19 to 65 (protects against 13 types of pneumococcal bacteria)  PPSV23: 1 to 2 doses through age 64, or 1 dose at 65 or older (protects against 23 types of pneumococcal bacteria)      Tetanus/diphtheria/pertussis (Td/Tdap) booster All women in this age group Td every 10 years, or a one-time dose of Tdap instead of a Td booster after age 18, then Td every 10 years   Counseling Who needs it How often   BRCA gene mutation testing for breast and ovarian cancer susceptibility Women with increased risk for having gene mutation When your risk is known   Breast cancer and chemoprevention Women at high risk for breast cancer When your risk is known   Diet and exercise Women who are overweight or obese When diagnosed, and then at routine exams   Domestic violence Women at the age in which they are able to have children At routine exams   Sexually transmitted infection prevention Women who are sexually active At routine exams   Skin cancer Prevention of skin cancer in fair-skinned adults At routine exams   Use of tobacco and the health effects it can cause All women in this age group Every visit   1 According to the ACS, women ages 20 to 39 years should have a clinical breast exam (CBE) as part of their routine health exam every 3 years. Breast self-exams are an option for women starting in their 20s. But the USPSTF does not recommend CBE.  Date Last Reviewed: 10/1/2017    8561-5174 [employer]. [address] [place] [address], [place] [address]. All rights reserved. This information is not intended as a substitute for professional medical care. Always follow your healthcare professional's instructions.            Patient Education     What Is Stress Urinary Incontinence?  Stress urinary incontinence is a common type of bladder control problem in women (although it may also occur in men). It refers to leakage of urine during events that result in increased abdominal pressure, such as sneezing, coughing, physical exercise, lifting, bending, and even changing positions. Stress incontinence may occur when the structures that help hold urine in your bladder become weak.  What are the symptoms of stress incontinence?  If you have stress incontinence, you may leak urine when you:    Cough, sneeze, or laugh    Lift something heavy    Exercise     Strong pelvic floor muscles and connective tissue, and a strong urethral sphincter, help keep urine in the bladder.       Weak or torn pelvic floor muscles and connective tissue, or a weak urethral sphincter, can let urine leak out of the bladder.      Normal urine control  The bladder holds urine until you are ready to let it flow out. These structures help:    The pelvic floor muscles and connective tissue help hold the pelvic organs in place. When the muscles and connective tissue are strong, the urethra and bladder are well supported. This helps keep the urethra closed, so urine doesn t leak.    The urethral sphincter is a band of muscles around the urethra. When these muscles are strong, they keep urine in the bladder. These muscles relax when you want urine to flow out.  If urine leaks out  The pelvic floor muscles and connective tissue may stretch, weaken, or tear. Weak or torn structures can t support the urethra and bladder. The urethral sphincter may also weaken. These changes can cause urine to leak. The changes may be caused by:    Pregnancy and vaginal childbirth or  (surgery to deliver a baby)    Constant coughing (such as with bronchitis or chronic cough from smoking)    Being overweight or obese    Hysterectomy or other pelvic surgery    Nerve  damage  Treatment  Different treatments are available for stress incontinence including:    Lifestyle changes such as reducing weight, quitting smoking, reducing drinks with caffeine or alcohol and bladder strengthening exercises    Surgery of various types    Various medical devices such as pessaries  Date Last Reviewed: 1/1/2017 2000-2019 [employer]. [address] [place] [address]. All rights reserved. This information is not intended as a substitute for professional medical care. Always follow your healthcare professional's instructions.

## 2020-06-11 NOTE — PROGRESS NOTES
Subjective     Mckayla Jeff is a 34 year old female who presents to clinic today for the following health issues:    HPI   Vaginal Symptoms  Onset: 1 week    Description:  Vaginal Discharge: none   Itching (Pruritis): YES  Burning sensation:  no   Odor: YES    Accompanying Signs & Symptoms:  Pain with Urination: no   Abdominal Pain: no   Fever: no     History:   Sexually active: no   New Partner: no   Possibility of Pregnancy:  No    Precipitating factors:   Recent Antibiotic Use: no     Alleviating factors:  None     Therapies Tried and outcome: None   Also having issues with incontinence with coughing, straining, often doesn't make it to the barhroom before she is already urinating. She endorses frequency, urgency, no dysuria, abdominal , flank, or back pain, no fever or chills.  Symptoms have been ongoing for several months.    Depression and Anxiety Follow-Up    How are you doing with your depression since your last visit? No change-has good days and bad days, is exercising more    How are you doing with your anxiety since your last visit?  Improved     Are you having other symptoms that might be associated with depression or anxiety? No    Have you had a significant life event? Health Concerns     Do you have any concerns with your use of alcohol or other drugs? No    Social History     Tobacco Use     Smoking status: Never Smoker     Smokeless tobacco: Never Used   Substance Use Topics     Alcohol use: No     Drug use: No     PHQ 3/4/2019 9/13/2019 12/5/2019   PHQ-9 Total Score 16 19 23   Q9: Thoughts of better off dead/self-harm past 2 weeks Not at all Not at all Not at all     PRASAD-7 SCORE 10/14/2015 11/3/2015 12/5/2019   Total Score - - -   Total Score 14 12 16     In the past two weeks have you had thoughts of suicide or self-harm?  No.    Do you have concerns about your personal safety or the safety of others?   No    Suicide Assessment Five-step Evaluation and Treatment (SAFE-T)      Patient Active  Problem List   Diagnosis     Essential hypertension with goal blood pressure less than 140/90     CARDIOVASCULAR SCREENING; LDL GOAL LESS THAN 160     PCOS (polycystic ovarian syndrome)     Obesity, Class III, BMI 40-49.9 (morbid obesity) (H)     Synovitis of knee     Internal derangement of knee     Adjustment disorder with mixed anxiety and depressed mood     Health Care Home     Moderate recurrent major depression (H)     Other chest pain     Past Surgical History:   Procedure Laterality Date     NO HISTORY OF SURGERY         Social History     Tobacco Use     Smoking status: Never Smoker     Smokeless tobacco: Never Used   Substance Use Topics     Alcohol use: No     Family History   Problem Relation Age of Onset     Hypertension Mother      Obesity Mother      Depression Mother      Cancer - colorectal Father      Diabetes Maternal Aunt      Depression Maternal Aunt      Asthma No family hx of      Cancer No family hx of      Blood Disease No family hx of      Neurologic Disorder No family hx of      Eye Disorder No family hx of      Thyroid Disease No family hx of      Heart Disease No family hx of      Lipids No family hx of          Current Outpatient Medications   Medication Sig Dispense Refill     citalopram (CELEXA) 10 MG tablet Take 1 tablet (10 mg) by mouth daily 30 tablet 1     hydrochlorothiazide (HYDRODIURIL) 25 MG tablet Take 1 tablet (25 mg) by mouth daily 90 tablet 1     losartan (COZAAR) 100 MG tablet Take 1 tablet (100 mg) by mouth daily 90 tablet 1     metFORMIN (GLUCOPHAGE) 500 MG tablet Take 1 tablet (500 mg) by mouth 2 times daily (with meals) 180 tablet 3     metroNIDAZOLE (FLAGYL) 500 MG tablet Take 1 tablet (500 mg) by mouth 2 times daily for 7 days 14 tablet 0     ondansetron (ZOFRAN ODT) 4 MG ODT tab Take 1-2 tablets (4-8 mg) by mouth every 8 hours as needed for nausea 20 tablet 1     potassium chloride ER (KLOR-CON M) 10 MEQ CR tablet Take 2 tablets (20 mEq) by mouth daily 180 tablet 3  "    BP Readings from Last 3 Encounters:   06/11/20 117/81   02/24/20 128/86   01/17/20 (!) 145/75    Wt Readings from Last 3 Encounters:   06/11/20 128.4 kg (283 lb)   02/24/20 127.3 kg (280 lb 9.6 oz)   01/17/20 132.5 kg (292 lb)                    Annual Wellness Visit    Are you in the first 12 months of your Medicare Part B coverage?  No    Physical Health:    In general, how would you rate your overall physical health? good    Outside of work, how many days during the week do you exercise?2-3 days/week    Outside of work, approximately how many minutes a day do you exercise?45-60 minutes    If you drink alcohol do you typically have >3 drinks per day or >7 drinks per week? No    Do you usually eat at least 4 servings of fruit and vegetables a day, include whole grains & fiber and avoid regularly eating high fat or \"junk\" foods? NO    Do you have any problems taking medications regularly? No    Do you have any side effects from medications? none    Needs assistance for the following daily activities: no assistance needed    Which of the following safety concerns are present in your home?  none identified     Hearing impairment: No    In the past 6 months, have you been bothered by leaking of urine? Yes- has been wearing pull up briefs    Mental Health:    In general, how would you rate your overall mental or emotional health? fair  PHQ-2 Score:      Do you feel safe in your environment? Yes    Have you ever done Advance Care Planning? (For example, a Health Directive, POLST, or a discussion with a medical provider or your loved ones about your wishes)? No, advance care planning information given to patient to review.  Advanced care planning was discussed at today's visit.    Fall risk:  none  click delete button to remove this line now    Do you have sleep apnea, excessive snoring or daytime drowsiness?: yes-excessive daytime sleepiness, snoring off and on.  Patient sleeps well-about 7-7.5 hours/noc, gets up " "once to void/noc, feels rested.    Current providers sharing in care for this patient include:   Patient Care Team:  Urmlia Dillon APRN CNP as PCP - General (Nurse Practitioner - Family)  Urmila Dillon APRN CNP as Assigned PCP        Reviewed and updated as needed this visit by Provider         Review of Systems   Constitutional, HEENT, cardiovascular, pulmonary, GI, , musculoskeletal, neuro, skin, endocrine and psych systems are negative, except as otherwise noted.      Objective    /81 (BP Location: Right arm, Patient Position: Chair, Cuff Size: Adult Large)   Pulse 82   Temp 98.1  F (36.7  C) (Oral)   Ht 1.765 m (5' 9.5\")   Wt 128.4 kg (283 lb)   SpO2 100%   BMI 41.19 kg/m    Body mass index is 41.19 kg/m .  Physical Exam   GENERAL: healthy, alert and no distress  EYES: Eyes grossly normal to inspection, PERRL and conjunctivae and sclerae normal  HENT: ear canals and TM's normal, nose and mouth without ulcers or lesions  NECK: no adenopathy, no asymmetry, masses, or scars and thyroid normal to palpation  RESP: lungs clear to auscultation - no rales, rhonchi or wheezes  BREAST: normal without masses, tenderness or nipple discharge and no palpable axillary masses or adenopathy  CV: regular rate and rhythm, normal S1 S2, no S3 or S4, no murmur, click or rub, no peripheral edema and peripheral pulses strong  ABDOMEN: soft, nontender, no hepatosplenomegaly, no masses and bowel sounds normal   (female): deferred  MS: no gross musculoskeletal defects noted, no edema  SKIN: no suspicious lesions or rashes  NEURO: Normal strength and tone, mentation intact and speech normal  PSYCH: mentation appears normal, affect normal/bright  LYMPH: no cervical, supraclavicular, axillary, or inguinal adenopathy    Diagnostic Test Results:  Labs reviewed in Epic  Results for orders placed or performed in visit on 06/11/20 (from the past 24 hour(s))   Wet prep    Specimen: Vagina   Result Value Ref Range    " Specimen Description Vagina     Wet Prep Few  Clue cells seen   (A)     Wet Prep No Trichomonas seen     Wet Prep No yeast seen     Wet Prep Rare  WBC'S seen      UA reflex to Microscopic and Culture    Specimen: Midstream Urine   Result Value Ref Range    Color Urine Yellow     Appearance Urine Clear     Glucose Urine Negative NEG^Negative mg/dL    Bilirubin Urine Negative NEG^Negative    Ketones Urine Negative NEG^Negative mg/dL    Specific Gravity Urine 1.025 1.003 - 1.035    Blood Urine Negative NEG^Negative    pH Urine 5.5 5.0 - 7.0 pH    Protein Albumin Urine Negative NEG^Negative mg/dL    Urobilinogen Urine 0.2 0.2 - 1.0 EU/dL    Nitrite Urine Negative NEG^Negative    Leukocyte Esterase Urine Negative NEG^Negative    Source Midstream Urine    Albumin Random Urine Quantitative with Creat Ratio   Result Value Ref Range    Creatinine Urine 271 mg/dL    Albumin Urine mg/L 12 mg/L    Albumin Urine mg/g Cr 4.43 0 - 25 mg/g Cr           Assessment & Plan     1. Vaginal itching    - Wet prep  - UA reflex to Microscopic and Culture    2. Moderate recurrent major depression (H)  Stable on current 10 mg Celexa daily, continue currrent management    3. Obesity, Class III, BMI 40-49.9 (morbid obesity) (H)  Benefits of weight loss reviewed in detail, encouraged hierto cut back on the carbohydrates in the diet, consume more fruits and vegetables, drink plenty of water, avoid fruit juices, sodas, get 150 min moderate exercise/week.  Recheck weight in 6 months.    4. Routine history and physical examination of adult      5. Stress incontinence of urine  Referring to Urology, encouraged Kegel exercises, weight loss, avoiding excessive fluids in the evening so she isn't having to get up at night to void,   - UROLOGY ADULT REFERRAL    6. BV (bacterial vaginosis)  Bacterial Vaginosis     Medication started today, see epic for orders.  Patient is to avoid alcohol while on Flagyl.  We briefly discussed the pathophysiology of  "bacterial vaginosis and outlined the expected course, discussed the warning symptoms and signs that indicate an atypical course that would need urgent follow up.  Patient education materials given during examination today.    - metroNIDAZOLE (FLAGYL) 500 MG tablet; Take 1 tablet (500 mg) by mouth 2 times daily for 7 days  Dispense: 14 tablet; Refill: 0    7. Essential hypertension with goal blood pressure less than 140/90  Due for labs,BP controlled.  - Basic metabolic panel  (Ca, Cl, CO2, Creat, Gluc, K, Na, BUN)  - Albumin Random Urine Quantitative with Creat Ratio  - hydrochlorothiazide (HYDRODIURIL) 25 MG tablet; Take 1 tablet (25 mg) by mouth daily  Dispense: 90 tablet; Refill: 1  - losartan (COZAAR) 100 MG tablet; Take 1 tablet (100 mg) by mouth daily  Dispense: 90 tablet; Refill: 1    8. CARDIOVASCULAR SCREENING; LDL GOAL LESS THAN 130    - Lipid panel reflex to direct LDL Fasting    9. Hypopotassemia    - potassium chloride ER (KLOR-CON M) 10 MEQ CR tablet; Take 2 tablets (20 mEq) by mouth daily  Dispense: 180 tablet; Refill: 3     BMI:   Estimated body mass index is 41.19 kg/m  as calculated from the following:    Height as of this encounter: 1.765 m (5' 9.5\").    Weight as of this encounter: 128.4 kg (283 lb).   Weight management plan: Discussed healthy diet and exercise guidelines        Work on weight loss  Regular exercise  See Patient Instructions    No follow-ups on file.    MELANIA Domingo [facility]      "

## 2020-07-07 ENCOUNTER — APPOINTMENT (OUTPATIENT)
Dept: URGENT CARE | Facility: URGENT CARE | Age: 35
End: 2020-07-07
Payer: COMMERCIAL

## 2020-07-07 ENCOUNTER — RESULTS ONLY (OUTPATIENT)
Dept: LAB | Age: 35
End: 2020-07-07

## 2020-07-09 LAB
SARS-COV-2 RNA SPEC QL NAA+PROBE: NOT DETECTED
SPECIMEN SOURCE: NORMAL

## 2020-08-12 ENCOUNTER — APPOINTMENT (OUTPATIENT)
Dept: FAMILY MEDICINE | Facility: CLINIC | Age: 35
End: 2020-08-12
Payer: COMMERCIAL

## 2020-08-12 ENCOUNTER — RESULTS ONLY (OUTPATIENT)
Dept: LAB | Age: 35
End: 2020-08-12

## 2020-08-12 LAB
SARS-COV-2 RNA SPEC QL NAA+PROBE: NOT DETECTED
SPECIMEN SOURCE: NORMAL

## 2020-08-19 ENCOUNTER — APPOINTMENT (OUTPATIENT)
Dept: FAMILY MEDICINE | Facility: CLINIC | Age: 35
End: 2020-08-19
Payer: COMMERCIAL

## 2020-08-19 ENCOUNTER — RESULTS ONLY (OUTPATIENT)
Dept: LAB | Age: 35
End: 2020-08-19

## 2020-08-20 LAB
SARS-COV-2 RNA SPEC QL NAA+PROBE: NOT DETECTED
SPECIMEN SOURCE: NORMAL

## 2020-09-15 ENCOUNTER — OFFICE VISIT (OUTPATIENT)
Dept: FAMILY MEDICINE | Facility: CLINIC | Age: 35
End: 2020-09-15
Payer: COMMERCIAL

## 2020-09-15 VITALS
TEMPERATURE: 98.1 F | HEART RATE: 94 BPM | OXYGEN SATURATION: 100 % | WEIGHT: 293 LBS | SYSTOLIC BLOOD PRESSURE: 131 MMHG | DIASTOLIC BLOOD PRESSURE: 85 MMHG | BODY MASS INDEX: 43.08 KG/M2

## 2020-09-15 DIAGNOSIS — N89.8 VAGINAL ODOR: Primary | ICD-10-CM

## 2020-09-15 LAB
ALBUMIN UR-MCNC: NEGATIVE MG/DL
APPEARANCE UR: CLEAR
BILIRUB UR QL STRIP: NEGATIVE
COLOR UR AUTO: YELLOW
GLUCOSE UR STRIP-MCNC: NEGATIVE MG/DL
HGB UR QL STRIP: NEGATIVE
KETONES UR STRIP-MCNC: 15 MG/DL
LEUKOCYTE ESTERASE UR QL STRIP: NEGATIVE
NITRATE UR QL: NEGATIVE
PH UR STRIP: 5.5 PH (ref 5–7)
SOURCE: ABNORMAL
SP GR UR STRIP: >1.03 (ref 1–1.03)
SPECIMEN SOURCE: NORMAL
UROBILINOGEN UR STRIP-ACNC: 0.2 EU/DL (ref 0.2–1)
WET PREP SPEC: NORMAL

## 2020-09-15 PROCEDURE — 87086 URINE CULTURE/COLONY COUNT: CPT | Performed by: PHYSICIAN ASSISTANT

## 2020-09-15 PROCEDURE — 99213 OFFICE O/P EST LOW 20 MIN: CPT | Performed by: PHYSICIAN ASSISTANT

## 2020-09-15 PROCEDURE — 81003 URINALYSIS AUTO W/O SCOPE: CPT | Performed by: PHYSICIAN ASSISTANT

## 2020-09-15 PROCEDURE — 87210 SMEAR WET MOUNT SALINE/INK: CPT | Performed by: PHYSICIAN ASSISTANT

## 2020-09-15 RX ORDER — CLINDAMYCIN HCL 150 MG
150 CAPSULE ORAL 2 TIMES DAILY
Qty: 14 CAPSULE | Refills: 0 | Status: SHIPPED | OUTPATIENT
Start: 2020-09-15 | End: 2020-09-22

## 2020-09-15 NOTE — PROGRESS NOTES
Subjective     Mckayla Jeff is a 34 year old female who presents to clinic today for the following health issues:    HPI       Genitourinary - Female  Onset/Duration: Couple months comes and goes  Description: vaginal odor that come a goes weekly. Mild at thsi time, but it gets worse and she gets smelly discharge with it   Painful urination (Dysuria): no           Frequency: no  Blood in urine (Hematuria): no  Delay in urine (Hesitency): no  Intensity: mild  Progression of Symptoms: Was improving with Flagyl from last OV but after dosage symptoms came back   Accompanying Signs & Symptoms:  Fever/chills: no  Flank pain: no  Nausea and vomiting: no  Vaginal symptoms: itching  Abdominal/Pelvic Pain: no  History:   History of frequent UTI s: YES  History of kidney stones: no  Sexually Active: no  Possibility of pregnancy: No  Precipitating or alleviating factors: None  Therapies tried and outcome:  Was improving with Flagyl from last OV but after dosage symptoms came back      Patient reports that she is not sexually active and there is no risk of STD. Patient declined STD tests today.    Review of Systems   Constitutional, HEENT, cardiovascular, pulmonary, gi and gu systems are negative, except as otherwise noted.      Objective    /85 (BP Location: Right arm, Patient Position: Chair, Cuff Size: Adult Large)   Pulse 94   Temp 98.1  F (36.7  C) (Oral)   Wt 134.3 kg (296 lb)   SpO2 100%   BMI 43.08 kg/m    Body mass index is 43.08 kg/m .     Physical Exam   GENERAL: healthy, alert and no distress  EYES: Eyes grossly normal to inspection,  conjunctivae and sclerae normal  HENT: normal cephalic/atraumatic, face is symmetrical,   RESP: no difficulty breathing, no use of accessory muscles observed.   : patient declined pelvic exam  CV: no peripheral edema and color normal, no parasternal heaves visualized.   MS: no gross musculoskeletal defects noted, no edema  SKIN: no suspicious lesions or rashes  NEURO:  Normal strength and tone, mentation intact and speech normal  PSYCH: mentation appears normal, affect normal/bright      Results for orders placed or performed in visit on 09/15/20 (from the past 24 hour(s))   *UA reflex to Microscopic and Culture ([facility] (except [place] and [place])    Specimen: Midstream Urine   Result Value Ref Range    Color Urine Yellow     Appearance Urine Clear     Glucose Urine Negative NEG^Negative mg/dL    Bilirubin Urine Negative NEG^Negative    Ketones Urine 15 (A) NEG^Negative mg/dL    Specific Gravity Urine >1.030 1.003 - 1.035    Blood Urine Negative NEG^Negative    pH Urine 5.5 5.0 - 7.0 pH    Protein Albumin Urine Negative NEG^Negative mg/dL    Urobilinogen Urine 0.2 0.2 - 1.0 EU/dL    Nitrite Urine Negative NEG^Negative    Leukocyte Esterase Urine Negative NEG^Negative    Source Midstream Urine    Wet prep    Specimen: Vagina   Result Value Ref Range    Specimen Description Vagina     Wet Prep No WBC's seen     Wet Prep No Trichomonas seen     Wet Prep No yeast seen     Wet Prep No clue cells seen            Assessment & Plan     Mckayla was seen today for uti.    Diagnoses and all orders for this visit:    Vaginal odor  -     *UA reflex to Microscopic and Culture ([facility] (except [place] and [place])  -     Wet prep  -     Urine Culture Aerobic Bacterial  -     clindamycin (CLEOCIN) 150 MG capsule; Take 1 capsule (150 mg) by mouth 2 times daily for 7 days      most likely bacterial vaginosis. Patient reports no sexual activity.   Patient declined pelvic exam today and STD screen        No follow-ups on file.    Renetta Umana PA-C  [facility]

## 2020-09-17 LAB
BACTERIA SPEC CULT: NORMAL
Lab: NORMAL
SPECIMEN SOURCE: NORMAL

## 2020-10-28 ENCOUNTER — IMMUNIZATION (OUTPATIENT)
Dept: NURSING | Facility: CLINIC | Age: 35
End: 2020-10-28
Payer: COMMERCIAL

## 2020-10-28 DIAGNOSIS — Z23 NEED FOR PROPHYLACTIC VACCINATION AND INOCULATION AGAINST INFLUENZA: Primary | ICD-10-CM

## 2020-10-28 PROCEDURE — 99207 PR NO CHARGE NURSE ONLY: CPT

## 2020-10-28 PROCEDURE — 90471 IMMUNIZATION ADMIN: CPT

## 2020-10-28 PROCEDURE — 90686 IIV4 VACC NO PRSV 0.5 ML IM: CPT

## 2020-12-29 ENCOUNTER — VIRTUAL VISIT (OUTPATIENT)
Dept: FAMILY MEDICINE | Facility: CLINIC | Age: 35
End: 2020-12-29
Payer: COMMERCIAL

## 2020-12-29 DIAGNOSIS — Z87.09 HISTORY OF INFLUENZA: Primary | ICD-10-CM

## 2020-12-29 PROCEDURE — 99207 PR NO BILLABLE SERVICE THIS VISIT: CPT | Performed by: NURSE PRACTITIONER

## 2020-12-29 NOTE — PROGRESS NOTES
"Mckayla Jeff is a 35 year old female who is being evaluated via a billable telephone visit.      The patient has been notified of following:     \"This telephone visit will be conducted via a call between you and your physician/provider. We have found that certain health care needs can be provided without the need for a physical exam.  This service lets us provide the care you need with a short phone conversation.  If a prescription is necessary we can send it directly to your pharmacy.  If lab work is needed we can place an order for that and you can then stop by our lab to have the test done at a later time.    Telephone visits are billed at different rates depending on your insurance coverage. During this emergency period, for some insurers they may be billed the same as an in-person visit.  Please reach out to your insurance provider with any questions.    If during the course of the call the physician/provider feels a telephone visit is not appropriate, you will not be charged for this service.\"    Patient has given verbal consent for Telephone visit?  Yes    What phone number would you like to be contacted at? See demographics    How would you like to obtain your AVS? MyChart    Subjective     Mckayla Jeff is a 35 year old female who presents via phone visit today for the following health issues:    HPI        35 year old female initiated a virtual visit to request a note to return to work. She was diagnosed with influenza A on 12/20. Her symptoms have resolved completely. Last fever 12/22. No chest pain or shortness of breath. No cough. Works as CNA. Not pregnant or breastfeeding.     Review of Systems   Constitutional, HEENT, cardiovascular, pulmonary, gi and gu systems are negative, except as otherwise noted.       Objective          Vitals:  No vitals were obtained today due to virtual visit.    healthy, alert and no distress  PSYCH: Alert and oriented times 3; coherent speech, normal   rate " "and volume, able to articulate logical thoughts, able   to abstract reason, no tangential thoughts, no hallucinations   or delusions  Her affect is normal  RESP: No cough, no audible wheezing, able to talk in full sentences  Remainder of exam unable to be completed due to telephone visits            Assessment/Plan:    Assessment & Plan     History of influenza  Symptoms resolved. Ok to return to work tomorrow. Note provided.       BMI:   Estimated body mass index is 43.08 kg/m  as calculated from the following:    Height as of 6/11/20: 1.765 m (5' 9.5\").    Weight as of 9/15/20: 134.3 kg (296 lb).            Return in about 1 week (around 1/5/2021), or if symptoms worsen or fail to improve.    MELANIA Morton CNP  [facility]    Phone call duration:  7 minutes                "

## 2020-12-29 NOTE — LETTER
December 29, 2020      Mckayla Jeff  [address]  [place] [address]        To Whom It May Concern:    Mckayla Jeff was seen in our clinic on 12/29/2020. She may return to work without restrictions on 12/30/2020.      Sincerely,        MELANIA Morton CNP

## 2021-01-04 ENCOUNTER — TELEPHONE (OUTPATIENT)
Dept: FAMILY MEDICINE | Facility: CLINIC | Age: 36
End: 2021-01-04

## 2021-01-05 NOTE — TELEPHONE ENCOUNTER
Patient called back and she wants to  her Letter to go back to work from [employer]. Printed letter and bringing to the  by 3:00. Explained letter  to the patient and patient understands.  Brittany Diallo [facility]  2nd Floor  Primary Care

## 2021-01-05 NOTE — TELEPHONE ENCOUNTER
Reason for Call:  Other call back    Detailed comments: Patient would like a new letter wrote out from her visit on 12/29/2020. She is requesting a call back to let provider know what she would like on the note and its for a return to work note.    Phone Number Patient can be reached at: Cell number on file:    Telephone Information:   Mobile [phone]       Best Time: Anytime    Can we leave a detailed message on this number? YES    Call taken on 1/4/2021 at 6:08 PM by Rupali Treadwell

## 2021-01-22 ENCOUNTER — OFFICE VISIT (OUTPATIENT)
Dept: FAMILY MEDICINE | Facility: CLINIC | Age: 36
End: 2021-01-22
Payer: COMMERCIAL

## 2021-01-22 ENCOUNTER — ANCILLARY PROCEDURE (OUTPATIENT)
Dept: GENERAL RADIOLOGY | Facility: CLINIC | Age: 36
End: 2021-01-22
Attending: NURSE PRACTITIONER
Payer: COMMERCIAL

## 2021-01-22 VITALS
WEIGHT: 293 LBS | HEIGHT: 70 IN | OXYGEN SATURATION: 96 % | SYSTOLIC BLOOD PRESSURE: 125 MMHG | BODY MASS INDEX: 41.95 KG/M2 | HEART RATE: 91 BPM | DIASTOLIC BLOOD PRESSURE: 82 MMHG | RESPIRATION RATE: 20 BRPM | TEMPERATURE: 98.3 F

## 2021-01-22 DIAGNOSIS — R29.898 WEAKNESS OF BOTH LOWER EXTREMITIES: ICD-10-CM

## 2021-01-22 DIAGNOSIS — I10 ESSENTIAL HYPERTENSION WITH GOAL BLOOD PRESSURE LESS THAN 140/90: ICD-10-CM

## 2021-01-22 DIAGNOSIS — E66.01 OBESITY, CLASS III, BMI 40-49.9 (MORBID OBESITY) (H): ICD-10-CM

## 2021-01-22 DIAGNOSIS — R51.9 NONINTRACTABLE EPISODIC HEADACHE, UNSPECIFIED HEADACHE TYPE: Primary | ICD-10-CM

## 2021-01-22 PROCEDURE — 72100 X-RAY EXAM L-S SPINE 2/3 VWS: CPT | Performed by: RADIOLOGY

## 2021-01-22 PROCEDURE — 99214 OFFICE O/P EST MOD 30 MIN: CPT | Performed by: NURSE PRACTITIONER

## 2021-01-22 ASSESSMENT — MIFFLIN-ST. JEOR: SCORE: 2100.99

## 2021-01-22 NOTE — PATIENT INSTRUCTIONS
Patient Education     Rebound Headache     Overuse of pain medications can lead to rebound headaches.   You use pain medicines called analgesics to treat your headaches. You are now having more frequent or intense headaches (rebound headaches). This is your body s response to too much pain medicine. Prescription pain medicines can cause these headaches. But so can over-the-counter medicines like acetaminophen or ibuprofen. A drug that contains caffeine or butalbital is most likely to cause rebound headache.  Symptoms of rebound headache include:    Mild to moderate headache for 15 or more days each month in a person with pre-existing headache disorder    Headache when you wake up that continues most of the day    Headaches getting worse over time    Need for more and more medicine to treat headaches  Your healthcare provider can usually diagnose rebound headaches by your symptoms and medicine history. You may need tests to rule out other causes of your headaches. In the emergency room, you may be given a non-analgesic pain medicine to treat the pain or stop future headaches.  Home care  Treatment involves stopping use of your pain medicines. Your healthcare provider can tell you how to safely do this. You may be able to stop right away. Or you may need to take less and less over time (taper off). This will depend on the medicines you have been taking. To do this, follow the schedule that your provider gives you. If you are taking pain medicines for other types of pain at the same time, your healthcare provider may need other specialists to participate in your care.    For the first week or so after stopping, the headaches will likely get worse. You may also have withdrawal symptoms. These often include nausea, vomiting, and trouble sleeping. You may be given a medicine to help relieve pain and withdrawal symptoms. Take this exactly as you have been told. It is vital to avoid taking daily pain medicine. If you do  so, rebound headaches will continue.    Caffeine can make rebound headaches worse. If you have caffeinated drinks every day, slowly cut your intake.    Keep a written log of your headaches. This can help you and your healthcare provider track your progress.    Be patient. It can take about 2 to 6 months to stop having rebound headaches.    Once you have broken the headache cycle, be careful not to start it again. Work with your provider to make a treatment plan for headache pain that has low risk of causing rebound headaches.    Relaxation can help lower tension and relieve pain. Try a massage, meditation, yoga, or other relaxation techniques. Or make time for a relaxing hobby that you enjoy.  Follow-up care  Follow up with your healthcare provider, or as advised.  When to seek medical advice  Call your healthcare provider right away if any of these occur:    Fever of 100.4 F (38 C) or higher    Headaches that wake you from sleep    Repeated vomiting or visual problems that don't go away    Headache with a stiff neck, rash, confusion, weakness, numbness, seizure (convulsion), or trouble talking    Headache that starts after a head injury or fall    A type of headache you have never had before    Headache that gets worse despite rest and medicine  [employer] last reviewed this educational content on 4/1/2018 2000-2020 [employer]. [address] [place] [address], [place] [address]. All rights reserved. This information is not intended as a substitute for professional medical care. Always follow your healthcare professional's instructions.

## 2021-01-22 NOTE — PROGRESS NOTES
"  Assessment & Plan     Nonintractable episodic headache, unspecified headache type  Ensure she is well hydrated, reviewed red flag symptoms/indications for return to clinic/ER visit.  Do not take tylenol/Ibuprofen for more than 3 days/week so as to avoid rebound headache (currently using 4-5 days/week). Keep headache log, return to clinic 3 weeks if not improved.  [place] paperwork completed and given to patient.    Weakness of both lower extremities  Getting x ray, could be due to deconditioning. Referring to physical therapy.  - XR Lumbar Spine 2/3 Views    Obesity, Class III, BMI 40-49.9 (morbid obesity) (H)  Benefits of weight loss reviewed in detail, encouraged her to cut back on the carbohydrates in the diet, consume more fruits and vegetables, drink plenty of water, avoid fruit juices, sodas, get 150 min moderate exercise/week.  Recheck weight in 6 months.      Essential hypertension with goal blood pressure less than 140/90  BP well controlled.    20 minutes spent on the date of the encounter doing chart review, history and exam, documentation and further activities as noted above         BMI:   Estimated body mass index is 42.96 kg/m  as calculated from the following:    Height as of this encounter: 1.765 m (5' 9.5\").    Weight as of this encounter: 133.9 kg (295 lb 2 oz).   Weight management plan: Discussed healthy diet and exercise guidelines      Work on weight loss  Regular exercise  See Patient Instructions    Return in about 4 weeks (around 2/19/2021), or if symptoms worsen or fail to improve, for Follow up.    MELANIA Domingo [facility]     Mckayla is a 35 year old who presents to clinic today for the following health issues     HPI       Headache  Onset/Duration: 2 months - has not been to work since Dec. Was diagnosed with Influenza A  12/20, needs [place] paperwork completed.   Description    Character: sharp pain-frontal bilaterally  Frequency:  " 3-4 times weekly   Duration:  24 hours   Wake with headaches: no  Able to do daily activities when headache present: YES  Intensity:  moderate, severe  Progression of Symptoms:  same and intermittent  Accompanying signs and symptoms:  Stiff neck: no  Neck or upper back pain: no  Sinus or URI symptoms YES- with headaches   Fever: YES- 2 weeks ago 102, had influenza  Nausea or vomiting: no  Dizziness: no  Numbness/tingling: no  Weakness: YES-legs, complains of lfeeling as if egs giving out on her when walking distances, no numbness/tingling, no chronic back issues but she did fall on ice 2 weeks agp  Visual changes: none  History  Head trauma: no  Family history of migraines: YES  Daily pain medication use: YES-ibuprofen and tylenol- taking 4-5 days/week  Previous tests for headaches: no  Neurologist evaluation: no  Precipitating or Alleviating factors (light/sound/sleep/caffeine): sleep improves headaches  Therapies tried and outcome: Ibuprofen (Advil, Motrin) and Tylenol              Outcome - not effective      Genitourinary - Female  Onset/Duration: 3 weeks   Description:   Painful urination (Dysuria): no           Frequency: YES  Blood in urine (Hematuria): no  Delay in urine (Hesitency): no  Intensity: moderate  Progression of Symptoms:  same and intermittent  Accompanying Signs & Symptoms:  Fever/chills: no  Flank pain: YES  Nausea and vomiting: no  Vaginal symptoms: odor  Abdominal/Pelvic Pain: no  History:   History of frequent UTI s: no  History of kidney stones: no  Sexually Active: no  Possibility of pregnancy: No    Hypertension Follow-up      Do you check your blood pressure regularly outside of the clinic? Yes     Are you following a low salt diet? Yes    Are your blood pressures ever more than 140 on the top number (systolic) OR more   than 90 on the bottom number (diastolic), for example 140/90? No      How many servings of fruits and vegetables do you eat daily?  0-1    On average, how many sweetened  "beverages do you drink each day (Examples: soda, juice, sweet tea, etc.  Do NOT count diet or artificially sweetened beverages)?   0    How many days per week do you exercise enough to make your heart beat faster? 3 or less    How many minutes a day do you exercise enough to make your heart beat faster? 9 or less    How many days per week do you miss taking your medication? 0      Review of Systems   Constitutional, HEENT, cardiovascular, pulmonary, gi and gu systems are negative, except as otherwise noted.      Objective    /82   Pulse 91   Temp 98.3  F (36.8  C) (Tympanic)   Resp 20   Ht 1.765 m (5' 9.5\")   Wt 133.9 kg (295 lb 2 oz)   LMP 12/07/2020   SpO2 96%   Breastfeeding No   BMI 42.96 kg/m    Body mass index is 42.96 kg/m .  Physical Exam   GENERAL: healthy, alert and no distress  EYES: Eyes grossly normal to inspection, PERRL and conjunctivae and sclerae normal  HENT: ear canals and TM's normal, nose and mouth without ulcers or lesions  NECK: no adenopathy, no asymmetry, masses, or scars and thyroid normal to palpation  RESP: lungs clear to auscultation - no rales, rhonchi or wheezes  CV: regular rate and rhythm, normal S1 S2, no S3 or S4, no murmur, click or rub, no peripheral edema and peripheral pulses strong  ABDOMEN: soft, nontender, no hepatosplenomegaly, no masses and bowel sounds normal  MS: no gross musculoskeletal defects noted, no edema  Comprehensive back pain exam:  Tenderness of left mid and lower back, Range of motion not limited by pain, Lower extremity strength functional and equal on both sides, Lower extremity reflexes within normal limits bilaterally, Lower extremity sensation normal and equal on both sides and Straight leg raise negative bilaterally  PSYCH: mentation appears normal, affect normal/bright  LYMPH: normal ant/post cervical, supraclavicular nodes    Xray - Reviewed and interpreted by me.  Some low of disc height, await formal read by Radiology  1/22/21  9:50 " AM [id number] [facility]    PACS Images     Show images for XR Lumbar Spine 2/3 Views   Study Result    LUMBAR SPINE TWO - THREE VIEWS  1/22/2021 9:50 AM      HISTORY: Fall 2 weeks ago now with bilateral leg weakness.     COMPARISON: Lumbar spine x-ray 6/8/2015.                                                                       IMPRESSION: Alignment of the lumbar spine is within normal limits. No  loss of vertebral body height. Mild degenerative endplate changes and  loss of disc height throughout the lumbar spine. Mild facet  hypertrophy in the lower lumbar spine.      TIERRA GREEN MD

## 2021-01-27 ENCOUNTER — TELEPHONE (OUTPATIENT)
Dept: FAMILY MEDICINE | Facility: CLINIC | Age: 36
End: 2021-01-27

## 2021-02-02 ENCOUNTER — TELEPHONE (OUTPATIENT)
Dept: FAMILY MEDICINE | Facility: CLINIC | Age: 36
End: 2021-02-02

## 2021-02-02 NOTE — LETTER
February 3, 2021      Mckayla Jeff  [address]  [place] [address]        To Whom It May Concern:    Mckayla Jeff was seen in our clinic on  1/22/21. She may return to work without restrictions.      Sincerely,        MELANIA Domingo CNP

## 2021-02-02 NOTE — TELEPHONE ENCOUNTER
Reason for Call:  Other Letter     Detailed comments: Patient is requesting a return to work letter. Was seen last week by provider. Patient would like to  the letter at the clinic and does not want it sent through her [employer].    Phone Number Patient can be reached at: Home number on file [phone] (home)    Best Time: anytime    Can we leave a detailed message on this number? YES    Call taken on 2/2/2021 at 9:38 AM by Bebe Crawford

## 2021-02-03 NOTE — TELEPHONE ENCOUNTER
Faxed to [facility]ah[facility] [employer], right fax confirmed at 11:04 am today, 2/3/2021.  Brittany Diallo [facility]  2nd Floor  Primary Care

## 2021-02-04 NOTE — TELEPHONE ENCOUNTER
Bringing the signed letter to the  by 1:00 pm today, 2/4/2021. Called and spoke to the patient and explained letter . Patient understands.  Brittnay Diallo [facility]  2nd Floor  Primary Care

## 2021-02-10 ENCOUNTER — TELEPHONE (OUTPATIENT)
Dept: FAMILY MEDICINE | Facility: CLINIC | Age: 36
End: 2021-02-10

## 2021-02-10 ENCOUNTER — OFFICE VISIT (OUTPATIENT)
Dept: FAMILY MEDICINE | Facility: CLINIC | Age: 36
End: 2021-02-10
Payer: COMMERCIAL

## 2021-02-10 VITALS
SYSTOLIC BLOOD PRESSURE: 122 MMHG | DIASTOLIC BLOOD PRESSURE: 88 MMHG | TEMPERATURE: 98 F | BODY MASS INDEX: 41.92 KG/M2 | HEART RATE: 97 BPM | OXYGEN SATURATION: 97 % | WEIGHT: 288 LBS

## 2021-02-10 DIAGNOSIS — H66.002 ACUTE SUPPURATIVE OTITIS MEDIA OF LEFT EAR WITHOUT SPONTANEOUS RUPTURE OF TYMPANIC MEMBRANE, RECURRENCE NOT SPECIFIED: Primary | ICD-10-CM

## 2021-02-10 DIAGNOSIS — F33.1 MODERATE RECURRENT MAJOR DEPRESSION (H): ICD-10-CM

## 2021-02-10 DIAGNOSIS — I10 ESSENTIAL HYPERTENSION WITH GOAL BLOOD PRESSURE LESS THAN 140/90: ICD-10-CM

## 2021-02-10 DIAGNOSIS — E28.2 PCOS (POLYCYSTIC OVARIAN SYNDROME): ICD-10-CM

## 2021-02-10 PROCEDURE — 99214 OFFICE O/P EST MOD 30 MIN: CPT | Performed by: NURSE PRACTITIONER

## 2021-02-10 RX ORDER — HYDROCHLOROTHIAZIDE 25 MG/1
25 TABLET ORAL DAILY
Qty: 90 TABLET | Refills: 1 | Status: SHIPPED | OUTPATIENT
Start: 2021-02-10 | End: 2021-10-25

## 2021-02-10 RX ORDER — AMOXICILLIN 875 MG
875 TABLET ORAL 2 TIMES DAILY
Qty: 20 TABLET | Refills: 0 | Status: SHIPPED | OUTPATIENT
Start: 2021-02-10 | End: 2021-10-25

## 2021-02-10 RX ORDER — LOSARTAN POTASSIUM 100 MG/1
100 TABLET ORAL DAILY
Qty: 90 TABLET | Refills: 1 | Status: SHIPPED | OUTPATIENT
Start: 2021-02-10 | End: 2021-10-25

## 2021-02-10 RX ORDER — FLUCONAZOLE 150 MG/1
150 TABLET ORAL ONCE
Qty: 1 TABLET | Refills: 0 | Status: SHIPPED | OUTPATIENT
Start: 2021-02-10 | End: 2021-02-10

## 2021-02-10 ASSESSMENT — PAIN SCALES - GENERAL: PAINLEVEL: MILD PAIN (3)

## 2021-02-10 NOTE — PATIENT INSTRUCTIONS
At [facility], we strive to deliver an exceptional experience to you, every time we see you. If you receive a survey, please complete it as we do value your feedback.  If you have MyChart, you can expect to receive results automatically within 24 hours of their completion.  Your provider will send a note interpreting your results as well.   If you do not have MyChart, you should receive your results in about a week by mail.    Your care team:                            Family Medicine Internal Medicine   MD Triston Vegas MD Shantel Branch-Fleming, MD Srinivasa Vaka, MD Katya Belousova, PADEJA Dickens, APRN CNP    James Polanco, MD Pediatrics   Gregor Brown, PADEJA Mccullough, CNP MD Corazon Villafana APRN CNP   MD Kristin Menendez MD Deborah Mielke, MD Dinah Dillon, APRN [facility]      Clinic hours: Monday - Thursday 7 am-6 pm; Fridays 7 am-5 pm.   Urgent care: Monday - Friday 11 am-9 pm; Saturday and Sunday 9 am-5 pm.    Clinic: [phone]       [facility]: Monday - Thursday 8 am - 7 pm; Friday 8 am - 6 pm  [facility]: [phone]     Use www.[email or web address] for 24/7 diagnosis and treatment of dozens of conditions.    Patient Education     Otitis Media (Middle-Ear Infection) in Adults  Otitis media is another name for a middle-ear infection. It means an infection behind your eardrum. This kind of ear infection can happen after any condition that keeps fluid from draining from the middle ear. These conditions include allergies, a cold, a sore throat, or a respiratory infection.  Middle-ear infections are common in children, but they can also happen in adults. An ear infection in an adult may mean a more serious problem than in a child. So you may need additional tests. If you have an ear infection, you should see your health care provider for treatment.  What are the types  of middle-ear infections?  Infections can affect the middle ear in several ways. They are:    Acute otitis media. This middle-ear infection occurs suddenly. It causes swelling and redness. Fluid and mucus become trapped inside the ear. You can have a fever and ear pain.    Otitis media with effusion. Fluid (effusion) and mucus build up in the middle ear after the infection goes away. You may feel like your middle ear is full. This can continue for months and may affect your hearing.    Chronic otitis media with effusion. Fluid (effusion) remains in the middle ear for a long time. Or it builds up again and again, even though there is no infection. This type of middle-ear infection may be hard to treat. It may also affect your hearing.  Who is more likely to get a middle-ear infection?  You are more likely to get an ear infection if you:    Smoke or are around someone who smokes    Have seasonal or year-round allergy symptoms    Have a cold or other upper respiratory infection  What causes a middle-ear infection?  The middle ear connects to the throat by a canal called the eustachian tube. This tube helps even out the pressure between the outer ear and the inner ear. A cold or allergy can irritate the tube or cause the area around it to swell. This can keep fluid from draining from the middle ear. The fluid builds up behind the eardrum. Bacteria and viruses can grow in this fluid. The bacteria and viruses cause the middle-ear infection.  What are the symptoms of a middle-ear infection?  Common symptoms of a middle-ear infection in adults are:    Pain in 1 or both ears    Drainage from the ear    Muffled hearing    Sore throat   You may also have a fever. Rarely, your balance can be affected.  These symptoms may be the same as for other conditions. It s important to talk with your health care provider if you think you have a middle-ear infection. If you have a high fever, severe pain behind your ear, or paralysis in  your face, see your provider as soon as you can.  How is a middle-ear infection diagnosed?  Your health care provider will take a medical history and do a physical exam. He or she will look at the outer ear and eardrum with an otoscope. The otoscope is a lighted tool that lets your provider see inside the ear. A pneumatic otoscope blows a puff of air into the ear to check how well your eardrum moves. If you eardrum doesn t move well, it may mean you have fluid behind it.  Your provider may also do a test called tympanometry. This test tells how well the middle ear is working. It can find any changes in pressure in the middle ear. Your provider may test your hearing with a tuning fork.  How is a middle-ear infection treated?  A middle-ear infection may be treated with:    Antibiotics, taken by mouth or as ear drops    Medication for pain    Decongestants, antihistamines, or nasal steroids  Your health care provider may also have you try autoinsufflation. This helps adjust the air pressure in your ear. For this, you pinch your nose and gently exhale. This forces air back through the eustachian tube.  The exact treatment for your ear infection will depend on the type of infection you have. In general, if your symptoms don t get better in 48 to 72 hours, contact your health care provider.  Middle-ear infections can cause long-term problems if not treated. They can lead to:    Infection in other parts of the head    Permanent hearing loss    Paralysis of a nerve in your face  If you have a middle-ear infection that doesn t get better, you may need to see an ear, nose, and throat specialist (otolaryngologist). You may need a CT scan or MRI to check for head and neck cancer.  Ear tubes  Sometimes fluid stays in the middle ear even after you take antibiotics and the infection goes away. In this case, your health care provider may suggest that a small tube be placed in your ear. The tube is put at the opening of the eardrum.  The tube keeps fluid from building up and relieves pressure in the middle ear. It can also help you hear better. This surgery is called myringotomy. It is not often done in adults.  The tubes usually fall out on their own after 6 months to a year.    9826-8431 [employer]. [address] [place] [address], [place] [address]. All rights reserved. This information is not intended as a substitute for professional medical care. Always follow your healthcare professional's instructions.

## 2021-02-10 NOTE — PROGRESS NOTES
Assessment & Plan     Acute suppurative otitis media of left ear without spontaneous rupture of tympanic membrane, recurrence not specified  Treating with Amoxil.  Diflucan given as she is prone to vaginal yeast infection when on antibiiotics.  Reviewed indications for return to clinic/UC visit.  - amoxicillin (AMOXIL) 875 MG tablet  Dispense: 20 tablet; Refill: 0  - fluconazole (DIFLUCAN) 150 MG tablet  Dispense: 1 tablet; Refill: 0    Essential hypertension with goal blood pressure less than 140/90  BP at goal, continue current management, low salt diet, regular exercise, weight loss efforts.  - hydrochlorothiazide (HYDRODIURIL) 25 MG tablet  Dispense: 90 tablet; Refill: 1  - losartan (COZAAR) 100 MG tablet  Dispense: 90 tablet; Refill: 1  - **Basic metabolic panel FUTURE anytime  - Albumin Random Urine Quantitative with Creat Ratio    PCOS (polycystic ovarian syndrome)  Refilled Metformin.  - metFORMIN (GLUCOPHAGE) 500 MG tablet  Dispense: 180 tablet; Refill: 3    Moderate recurrent major depression (H)  PHQ-9=13,PRASAD-7=11 today.  She continues on  Celexa, is not interested in dosage adjustment. Continue counseling, return to clinic 3 months.  [id number]}     Work on weight loss  Regular exercise  See Patient Instructions    Return in about 2 weeks (around 2/24/2021) for Follow up.    MELANIA Domingo [facility]   Mckayla is a 35 year old who presents for the following health issues HPI       Concern - Ear pain/Congestion  Onset: Ear pain started 02/06/2021 and congestion (yellow, thick) started 02/05/2021  Description: Patient is have pain, bilateral ears and complains of nasal congestion.   Intensity: severe  Progression of Symptoms:  worsening  Accompanying Signs & Symptoms: None  Previous history of similar problem: None  Precipitating factors:        Worsened by: None  Alleviating factors:        Improved by: None  Therapies tried and outcome:OTC pain  relief Ear drops, complains worsening of symptoms due to the ear drops      Review of Systems   Constitutional, HEENT, cardiovascular, pulmonary, gi and gu systems are negative, except as otherwise noted.      Objective    /88 (BP Location: Left arm, Patient Position: Sitting, Cuff Size: Adult Large)   Pulse 97   Temp 98  F (36.7  C) (Tympanic)   Wt 130.6 kg (288 lb)   SpO2 97%   BMI 41.92 kg/m    Body mass index is 41.92 kg/m .  Physical Exam   GENERAL: healthy, alert and no distress  EYES: Eyes grossly normal to inspection, PERRL and conjunctivae and sclerae normal  HENT: normal cephalic/atraumatic, right ear: normal: no effusions, no erythema, normal landmarks, left ear: erythematous and mucopurulent effusion, nose and mouth without ulcers or lesions, oropharynx clear and oral mucous membranes moist  NECK: no adenopathy, no asymmetry, masses, or scars and thyroid normal to palpation  RESP: lungs clear to auscultation - no rales, rhonchi or wheezes  CV: regular rate and rhythm, normal S1 S2, no S3 or S4, no murmur, click or rub, no peripheral edema and peripheral pulses strong  ABDOMEN: soft, nontender, no hepatosplenomegaly, no masses and bowel sounds normal  MS: no gross musculoskeletal defects noted, no edema

## 2021-02-10 NOTE — TELEPHONE ENCOUNTER
.Reason for call:  Form   Our goal is to have forms completed within 72 hours, however some forms may require a visit or additional information.     Who is the form from? Patient  Where did the form come from? Patient or family brought in     What clinic location [facility] the [facility]  [facility]  What number is listed as a contact on the form? [phone]    Phone call message - patient request for a letter, form or note:     Date needed: as soon as possible  Patient will  at the clinic when completed  Has the patient signed a consent form for release of information? YES    Additional comments: n/a    Type of letter, form or note: medical    Phone number to reach patient:  Home number on file [phone] (home)    Best Time:  anything    Can we leave a detailed message on this number?  YES    Travel screening: Negative

## 2021-02-11 NOTE — TELEPHONE ENCOUNTER
Received forms and placed in Dinah's red folder.  Brittany Diallo MA  [facility]  2nd Floor  Primary Care

## 2021-02-23 ASSESSMENT — PATIENT HEALTH QUESTIONNAIRE - PHQ9
5. POOR APPETITE OR OVEREATING: MORE THAN HALF THE DAYS
SUM OF ALL RESPONSES TO PHQ QUESTIONS 1-9: 13

## 2021-02-23 ASSESSMENT — ANXIETY QUESTIONNAIRES
1. FEELING NERVOUS, ANXIOUS, OR ON EDGE: SEVERAL DAYS
2. NOT BEING ABLE TO STOP OR CONTROL WORRYING: MORE THAN HALF THE DAYS
6. BECOMING EASILY ANNOYED OR IRRITABLE: SEVERAL DAYS
3. WORRYING TOO MUCH ABOUT DIFFERENT THINGS: NEARLY EVERY DAY
IF YOU CHECKED OFF ANY PROBLEMS ON THIS QUESTIONNAIRE, HOW DIFFICULT HAVE THESE PROBLEMS MADE IT FOR YOU TO DO YOUR WORK, TAKE CARE OF THINGS AT HOME, OR GET ALONG WITH OTHER PEOPLE: VERY DIFFICULT
7. FEELING AFRAID AS IF SOMETHING AWFUL MIGHT HAPPEN: NOT AT ALL
5. BEING SO RESTLESS THAT IT IS HARD TO SIT STILL: MORE THAN HALF THE DAYS
GAD7 TOTAL SCORE: 11

## 2021-02-23 NOTE — TELEPHONE ENCOUNTER
Received signed form and faxed to [facility], [phone], right fax confirmed at 11:11 am today, 2/23/2021. Copy to TC and abstracting. Bringing original to the  by 1:00 pm today, 2/23/2021. Called and spoke to the patient and explained form , patient understands.  Brittany Diallo [facility]  2nd Floor  Primary Care

## 2021-02-23 NOTE — TELEPHONE ENCOUNTER
Received a note from Dinah to have patient sign her portion before faxing. Bringing to the  by 10:00 am today, 2/23/2021. Called and spoke to patient, patient understands to come and sign her part before faxing.  Brittany Diallo MA  [facility]  2nd Floor  Primary Care

## 2021-02-24 ASSESSMENT — ANXIETY QUESTIONNAIRES: GAD7 TOTAL SCORE: 11

## 2021-04-30 NOTE — TELEPHONE ENCOUNTER
04/30/21  Envelope not picked up, placed in shred box.  Brittany Diallo MA  [facility]  2nd Floor  Primary Care

## 2021-04-30 NOTE — TELEPHONE ENCOUNTER
Letter not picked up, placed in shred box.  Brittany Diallo MA  [facility]  2nd Floor  Primary Care

## 2021-07-25 ENCOUNTER — HEALTH MAINTENANCE LETTER (OUTPATIENT)
Age: 36
End: 2021-07-25

## 2021-09-19 ENCOUNTER — HEALTH MAINTENANCE LETTER (OUTPATIENT)
Age: 36
End: 2021-09-19

## 2021-10-25 ENCOUNTER — OFFICE VISIT (OUTPATIENT)
Dept: FAMILY MEDICINE | Facility: CLINIC | Age: 36
End: 2021-10-25
Payer: COMMERCIAL

## 2021-10-25 VITALS
BODY MASS INDEX: 40.91 KG/M2 | OXYGEN SATURATION: 97 % | SYSTOLIC BLOOD PRESSURE: 159 MMHG | HEART RATE: 99 BPM | WEIGHT: 292.2 LBS | RESPIRATION RATE: 16 BRPM | TEMPERATURE: 99 F | DIASTOLIC BLOOD PRESSURE: 108 MMHG | HEIGHT: 71 IN

## 2021-10-25 DIAGNOSIS — Z12.4 PAPANICOLAOU SMEAR FOR CERVICAL CANCER SCREENING: ICD-10-CM

## 2021-10-25 DIAGNOSIS — E66.01 OBESITY, CLASS III, BMI 40-49.9 (MORBID OBESITY) (H): ICD-10-CM

## 2021-10-25 DIAGNOSIS — Z00.00 ROUTINE GENERAL MEDICAL EXAMINATION AT A HEALTH CARE FACILITY: Primary | ICD-10-CM

## 2021-10-25 DIAGNOSIS — E87.6 HYPOKALEMIA: ICD-10-CM

## 2021-10-25 DIAGNOSIS — Z11.3 ROUTINE SCREENING FOR STI (SEXUALLY TRANSMITTED INFECTION): ICD-10-CM

## 2021-10-25 DIAGNOSIS — Z13.6 CARDIOVASCULAR SCREENING; LDL GOAL LESS THAN 160: ICD-10-CM

## 2021-10-25 DIAGNOSIS — F33.1 MODERATE RECURRENT MAJOR DEPRESSION (H): ICD-10-CM

## 2021-10-25 DIAGNOSIS — I10 ESSENTIAL HYPERTENSION WITH GOAL BLOOD PRESSURE LESS THAN 140/90: ICD-10-CM

## 2021-10-25 DIAGNOSIS — N76.0 BV (BACTERIAL VAGINOSIS): ICD-10-CM

## 2021-10-25 DIAGNOSIS — B96.89 BV (BACTERIAL VAGINOSIS): ICD-10-CM

## 2021-10-25 LAB
CLUE CELLS: PRESENT
TRICHOMONAS, WET PREP: ABNORMAL
WBC'S/HIGH POWER FIELD, WET PREP: ABNORMAL
YEAST, WET PREP: ABNORMAL

## 2021-10-25 PROCEDURE — 80061 LIPID PANEL: CPT | Performed by: NURSE PRACTITIONER

## 2021-10-25 PROCEDURE — 90471 IMMUNIZATION ADMIN: CPT | Performed by: NURSE PRACTITIONER

## 2021-10-25 PROCEDURE — 82043 UR ALBUMIN QUANTITATIVE: CPT | Performed by: NURSE PRACTITIONER

## 2021-10-25 PROCEDURE — 87624 HPV HI-RISK TYP POOLED RSLT: CPT | Performed by: NURSE PRACTITIONER

## 2021-10-25 PROCEDURE — G0145 SCR C/V CYTO,THINLAYER,RESCR: HCPCS | Performed by: NURSE PRACTITIONER

## 2021-10-25 PROCEDURE — 99395 PREV VISIT EST AGE 18-39: CPT | Mod: 25 | Performed by: NURSE PRACTITIONER

## 2021-10-25 PROCEDURE — 36415 COLL VENOUS BLD VENIPUNCTURE: CPT | Performed by: NURSE PRACTITIONER

## 2021-10-25 PROCEDURE — 87491 CHLMYD TRACH DNA AMP PROBE: CPT | Performed by: NURSE PRACTITIONER

## 2021-10-25 PROCEDURE — 87210 SMEAR WET MOUNT SALINE/INK: CPT | Performed by: NURSE PRACTITIONER

## 2021-10-25 PROCEDURE — 80053 COMPREHEN METABOLIC PANEL: CPT | Performed by: NURSE PRACTITIONER

## 2021-10-25 PROCEDURE — 90686 IIV4 VACC NO PRSV 0.5 ML IM: CPT | Performed by: NURSE PRACTITIONER

## 2021-10-25 PROCEDURE — 99213 OFFICE O/P EST LOW 20 MIN: CPT | Mod: 25 | Performed by: NURSE PRACTITIONER

## 2021-10-25 RX ORDER — METRONIDAZOLE 500 MG/1
500 TABLET ORAL 2 TIMES DAILY
Qty: 14 TABLET | Refills: 0 | Status: SHIPPED | OUTPATIENT
Start: 2021-10-25 | End: 2021-11-01

## 2021-10-25 RX ORDER — POTASSIUM CHLORIDE 750 MG/1
20 TABLET, EXTENDED RELEASE ORAL DAILY
Qty: 180 TABLET | Refills: 3 | Status: SHIPPED | OUTPATIENT
Start: 2021-10-25 | End: 2023-02-13

## 2021-10-25 RX ORDER — LOSARTAN POTASSIUM 100 MG/1
100 TABLET ORAL DAILY
Qty: 90 TABLET | Refills: 3 | Status: SHIPPED | OUTPATIENT
Start: 2021-10-25 | End: 2023-02-13

## 2021-10-25 RX ORDER — AMLODIPINE BESYLATE 5 MG/1
5 TABLET ORAL DAILY
Qty: 90 TABLET | Refills: 1 | Status: SHIPPED | OUTPATIENT
Start: 2021-10-25 | End: 2023-02-13

## 2021-10-25 RX ORDER — HYDROCHLOROTHIAZIDE 25 MG/1
25 TABLET ORAL DAILY
Qty: 90 TABLET | Refills: 3 | Status: SHIPPED | OUTPATIENT
Start: 2021-10-25 | End: 2023-02-13

## 2021-10-25 ASSESSMENT — PAIN SCALES - GENERAL: PAINLEVEL: NO PAIN (0)

## 2021-10-25 ASSESSMENT — ENCOUNTER SYMPTOMS
NERVOUS/ANXIOUS: 1
FREQUENCY: 0
CONSTIPATION: 0
HEARTBURN: 0
EYE PAIN: 0
HEMATURIA: 0
FEVER: 0
WEAKNESS: 0
PARESTHESIAS: 0
SHORTNESS OF BREATH: 0
DYSURIA: 0
NAUSEA: 1
HEADACHES: 1
ARTHRALGIAS: 0
PALPITATIONS: 0
JOINT SWELLING: 0
ABDOMINAL PAIN: 0
SORE THROAT: 1
CHILLS: 0
DIARRHEA: 0
BREAST MASS: 0
DIZZINESS: 0
HEMATOCHEZIA: 0
MYALGIAS: 1
COUGH: 0

## 2021-10-25 ASSESSMENT — PATIENT HEALTH QUESTIONNAIRE - PHQ9
SUM OF ALL RESPONSES TO PHQ QUESTIONS 1-9: 8
10. IF YOU CHECKED OFF ANY PROBLEMS, HOW DIFFICULT HAVE THESE PROBLEMS MADE IT FOR YOU TO DO YOUR WORK, TAKE CARE OF THINGS AT HOME, OR GET ALONG WITH OTHER PEOPLE: NOT DIFFICULT AT ALL
SUM OF ALL RESPONSES TO PHQ QUESTIONS 1-9: 8

## 2021-10-25 ASSESSMENT — MIFFLIN-ST. JEOR: SCORE: 2110.5

## 2021-10-25 NOTE — NURSING NOTE
Prior to immunization administration, verified patients identity using patient s name and date of birth. Please see Immunization Activity for additional information.     Screening Questionnaire for Adult Immunization    Are you sick today?   No   Do you have allergies to medications, food, a vaccine component or latex?   No   Have you ever had a serious reaction after receiving a vaccination?   No   Do you have a long-term health problem with heart, lung, kidney, or metabolic disease (e.g., diabetes), asthma, a blood disorder, no spleen, complement component deficiency, a cochlear implant, or a spinal fluid leak?  Are you on long-term aspirin therapy?   No   Do you have cancer, leukemia, HIV/AIDS, or any other immune system problem?   No   Do you have a parent, brother, or sister with an immune system problem?   No   In the past 3 months, have you taken medications that affect  your immune system, such as prednisone, other steroids, or anticancer drugs; drugs for the treatment of rheumatoid arthritis, Crohn s disease, or psoriasis; or have you had radiation treatments?   No   Have you had a seizure, or a brain or other nervous system problem?   No   During the past year, have you received a transfusion of blood or blood    products, or been given immune (gamma) globulin or antiviral drug?   No   For women: Are you pregnant or is there a chance you could become       pregnant during the next month?   No   Have you received any vaccinations in the past 4 weeks?   No     Immunization questionnaire answers were all negative.        Per orders of Dinah Dillon, injection of FLU  given by Heather Stearns. Patient instructed to remain in clinic for 15 minutes afterwards, and to report any adverse reaction to me immediately.       Screening performed by Heather Stearns on 10/25/2021 at 6:40 PM.
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
## 2021-10-25 NOTE — PATIENT INSTRUCTIONS
Preventive Health Recommendations  Female Ages 26 - 39  Yearly exam:   See your health care provider every year in order to    Review health changes.     Discuss preventive care.      Review your medicines if you your doctor has prescribed any.    Until age 30: Get a Pap test every three years (more often if you have had an abnormal result).    After age 30: Talk to your doctor about whether you should have a Pap test every 3 years or have a Pap test with HPV screening every 5 years.   You do not need a Pap test if your uterus was removed (hysterectomy) and you have not had cancer.  You should be tested each year for STDs (sexually transmitted diseases), if you're at risk.   Talk to your provider about how often to have your cholesterol checked.  If you are at risk for diabetes, you should have a diabetes test (fasting glucose).  Shots: Get a flu shot each year. Get a tetanus shot every 10 years.   Nutrition:     Eat at least 5 servings of fruits and vegetables each day.    Eat whole-grain bread, whole-wheat pasta and brown rice instead of white grains and rice.    Get adequate Calcium and Vitamin D.     Lifestyle    Exercise at least 150 minutes a week (30 minutes a day, 5 days of the week). This will help you control your weight and prevent disease.    Limit alcohol to one drink per day.    No smoking.     Wear sunscreen to prevent skin cancer.    See your dentist every six months for an exam and cleaning.  At [facility], we strive to deliver an exceptional experience to you, every time we see you. If you receive a survey, please complete it as we do value your feedback.  If you have Application Expertshart, you can expect to receive results automatically within 24 hours of their completion.  Your provider will send a note interpreting your results as well.   If you do not have MyChart, you should receive your results in about a week by mail.    Your care team:                            Family  Medicine Internal Medicine   MD Triston Vegas MD Shantel Branch-Fleming, MD Srinivasa Vaka, MD Katya Belousova, PADEJA Dickens, APRMARKO Polanco MD Pediatrics   Gregor Brown, PADEJA Mccullough, MD Corazon Mena APRN CNP   MD Kristin Menendez MD Deborah Mielke, MD Kim Thein, APRN [facility]      Clinic hours: Monday - Thursday 7 am-6 pm; Fridays 7 am-5 pm.   Urgent care: Monday - Friday 10 am- 8 pm; Saturday and Sunday 9 am-5 pm.    Clinic: [phone]       [facility]: Monday - Thursday 8 am - 7 pm; Friday 8 am - 6 pm  [facility]: [phone]     Use [email or web address] for 24/7 diagnosis and treatment of dozens of conditions.    Patient Education     Prevention Guidelines, Women Ages 18 to 39  Screening tests and vaccines are an important part of managing your health. A screening test is done to find possible disorders or diseases in people who don't have any symptoms. The goal is to find a disease early so lifestyle changes can be made and you can be watched more closely to reduce the risk of disease, or to detect it early enough to treat it most effectively. Screening tests are not considered diagnostic, but are used to determine if more testing is needed. Health counseling is essential, too. Below are guidelines for these, for women ages 18 to 39. Talk with your healthcare provider to make sure you re up-to-date on what you need.   Screening Who needs it How often   Alcohol misuse All women in this age group  At routine exams   Blood pressure All women in this age group  Yearly checkup if your blood pressure is normal   Normal blood pressure is less than 120/80 mm Hg   If your blood pressure reading is higher than normal, follow the advice of your healthcare provider    Breast cancer All women in this age group should talk with their healthcare providers about the need for clinical  breast exams (CBE)1  Clinical breast exam every 3 years1    Cervical cancer Women ages 21 and older  Women between ages 21 and 29 should have a Pap test every 3 years; women between ages 30 and 65 are advised to have a Pap test plus an HPV test every 5 years    Chlamydia Sexually active women ages 24 and younger, and women at increased risk for infection  Every 3 years if you're at risk or have symptoms    Depression All women in this age group  At routine exams   Diabetes mellitus, type 2  Adults with no symptoms who are overweight or obese and have 1 or more other risk factors for diabetes  At least every 3 years. Also, testing for diabetes during pregnancy after the 24th week.     Gonorrhea Sexually active women at increased risk for infection  At routine exams   Hepatitis C Anyone at increased risk  At routine exams   HIV All women At routine exams3     Obesity All women in this age group  At routine exams   Syphilis Women at increased risk for infection should talk with their healthcare provider  At routine exams   Tuberculosis Women at increased risk for infection should talk with their healthcare provider  Ask your healthcare provider    Vision All women in this age group  At least 1 complete exam in your 20s, and 2 in your 30s    Vaccine Who needs it How often   Chickenpox (varicella)  All women in this age group who have no record of this infection or vaccine  2 doses; the second dose should be given 4 to 8 weeks after the first dose    Hepatitis A Women at increased risk for infection should talk with their healthcare provider  2 doses given at least 6 months apart    Hepatitis B Women at increased risk for infection should talk with their healthcare provider  3 doses over 6 months; second dose should be given 1 month after the first dose; the third dose should be given at least 2 months after the second dose and at least 4 months after the first dose    Haemophilus influenzae  Type B (HIB)  Women at  increased risk for infection should talk with their healthcare provider  1 to 3 doses   Human papillomavirus (HPV)  All women in this age group up to age 26  3 doses; the second dose should be given 1 to 2 months after the first dose and the third dose given 6 months after the first dose    Influenza (flu) All women in this age group  Once a year   Measles, mumps, rubella (MMR)  All women in this age group who have no record of these infections or vaccines  1 or 2 doses   Meningococcal Women at increased risk for infection should talk with their healthcare provider  1 or more doses   Pneumococcal conjugate vaccine (PCV13) and pneumococcal polysaccharide vaccine (PPSV23)  Women at increased risk for infection should talk with their healthcare provider  PCV13: 1 dose ages 19 to 65 (protects against 13 types of pneumococcal bacteria)   PPSV23: 1 to 2 doses through age 64, or 1 dose at 65 or older (protects against 23 types of pneumococcal bacteria)    Tetanus/diphtheria/pertussis (Td/Tdap) booster All women in this age group  Td every 10 years, or a one-time dose of Tdap instead of a Td booster after age 18, then Td every 10 years    Counseling Who needs it How often   BRCA gene mutation testing for breast and ovarian cancer susceptibility  Women with increased risk for having gene mutation  When your risk is known    Breast cancer and chemoprevention  Women at high risk for breast cancer  When your risk is known    Diet and exercise Women who are overweight or obese  When diagnosed, and then at routine exams    Domestic violence Women at the age in which they are able to have children  At routine exams   Sexually transmitted infection prevention  Women who are sexually active  At routine exams   Skin cancer Prevention of skin cancer in fair-skinned adults  At routine exams   Use of tobacco and the health effects it can cause  All women in this age group  Every visit   1 According to the ACS, women ages 20 to 39 years  should have a clinical breast exam (CBE) as part of their routine health exam every 3 years. Breast self-exams are an option for women starting in their 20s. But the U.S. Preventive Services Task Force (USPSTF) does not recommend CBE.   2 Those who are 18 years old and not up-to-date on their childhood vaccines should get all appropriate catch-up vaccines recommended by the CDC.   3 The USPSTF recommends that all people ages 15 to 65 years be screened for HIV and those younger or older people at increased risk. The CDC recommends that everyone between the ages of 13 and 64 get tested for HIV at least once as part of routine health care.   [employer] last reviewed this educational content on 10/1/2017    9319-7239 [employer]. All rights reserved. This information is not intended as a substitute for professional medical care. Always follow your healthcare professional's instructions.           Patient Education     Established High Blood Pressure    High blood pressure (hypertension) is a long-term (chronic) disease. Often healthcare providers don t know what causes it. But it can be caused by certain health conditions and medicines.  If you have high blood pressure, you may not have any symptoms. If you do have symptoms, they may include:    Headache    Dizziness    Changes in your vision    Chest pain    Shortness of breath  But even without symptoms, high blood pressure that s not treated raises your risk for heart attack, heart failure, kidney disease, and stroke. High blood pressure is a serious health risk and shouldn t be ignored.  Blood pressure measurements are given as 2 numbers. Systolic blood pressure is the upper number. This is the pressure when the heart contracts. Diastolic blood pressure is the lower number. This is the pressure when the heart relaxes between beats. You will see your blood pressure readings written together. For example, a person with a systolic pressure of 118 and a  diastolic pressure of 78 will have 118/78 written in the medical record.  Blood pressure is classified as normal, raised (elevated) or stage 1 or stage 2 high blood pressure:    Normal blood pressure. Systolic of less than 120 and diastolic of less than 80 (120/80).    Elevated blood pressure. Systolic of 120 to 129 and diastolic less than 80.    Stage 1 high blood pressure. Systolic is 130 to 139 or diastolic between 80 to 89.    Stage 2 high blood pressure. Systolic is 140 or higher or the diastolic is 90 or higher.  Home care  If you have high blood pressure, follow these home care guidelines to help lower your blood pressure. If you are taking medicines for high blood pressure, these methods may reduce or end your need for medicines in the future.    Start a weight-loss program if you are overweight.    Cut back on how much salt you get in your diet. Here s how to do this:  ? Don t eat foods that have a lot of salt. These include olives, pickles, smoked meats, and salted potato chips.  ? Don t add salt to your food at the table.  ? Use only small amounts of salt when cooking.    Start an exercise program. Talk with your healthcare provider about the type of exercise program that would be best for you. It doesn't have to be hard. Even brisk walking for 20 minutes 3 times a week is a good form of exercise.    Don t take medicines that stimulate the heart. This includes many over-the-counter cold and sinus decongestant pills and sprays, as well as diet pills. Check the warnings about high blood pressure on the label. Before buying any over-the-counter medicines or supplements, always ask the pharmacist about the product's possible interaction with your high blood pressure and your high blood pressure medicines.    Stimulants such as amphetamine or cocaine could be deadly for someone with high blood pressure. Never take these.    Limit how much caffeine you get in your diet. Switch to caffeine-free products.    Stop  smoking. If you are a long-time smoker, this can be hard. Talk with your healthcare provider about medicines and nicotine replacement options to help you. Also join a stop-smoking program . This makes it more likely that you will quit for good.    Learn how to handle stress. This is an important part of any program to lower blood pressure. Learn about relaxation methods such as meditation, yoga, or biofeedback.    If your provider prescribed medicines, take them exactly as directed. Missing doses may cause your blood pressure get out of control.    If you miss a dose, check with your healthcare provider or pharmacist about what to do.    Think about buying an automatic blood pressure machine to check your blood pressure at home. Ask your provider for a recommendation. You can get one of these at most pharmacies.  Using a home blood pressure monitor  The American Heart Association advises the following guidelines for home blood pressure monitoring:    Don't smoke or drink coffee for 30 minutes before taking your blood pressure.    Go to the bathroom before the test.    Relax for 5 minutes before taking the measurement.    Sit with your back supported (don't sit on a couch or soft chair). Keep your feet on the floor uncrossed. Place your arm on a solid flat surface (such as a table) with the upper part of the arm at heart level. Place the middle of the cuff directly above the bend of the elbow. Check the monitor's instruction manual for an illustration.    Take multiple readings. When you measure, take 2 to 3 readings one minute apart and record all of the results.    Take your blood pressure at the same time every day, or as your healthcare provider advises.    Record the date, time, and blood pressure reading.    Take the record with you to your next healthcare appointment. If your blood pressure monitor has a built-in memory, just take the monitor with you to your next appointment.    Call your provider if you have  several high readings. Don't be frightened by one high blood pressure reading. But if you get a few high readings, check in with your healthcare provider.  Follow-up care  You will need to see your healthcare provider regularly. This is to check your blood pressure and to make changes to your medicines. Make a follow-up appointment as directed. Bring the record of your home blood pressure readings to the appointment.  Call 911  Call 911 if you have any of these:    Blood pressure of 180/120 or higher    Chest pain or shortness of breath    Weakness of an arm or leg or one side of the face    Problems speaking or seeing     When to get medical advice  Call your healthcare provider right away if any of these occur:    Severe headache    Throbbing or rushing sound in the ears    Nosebleed    Sudden severe pain in your belly (abdomen)    Extreme drowsiness, confusion, or fainting    Dizziness or spinning feeling (vertigo)  [employer] last reviewed this educational content on 7/1/2019 2000-2021 [employer]. All rights reserved. This information is not intended as a substitute for professional medical care. Always follow your healthcare professional's instructions.

## 2021-10-26 LAB
ALBUMIN SERPL-MCNC: 3.2 G/DL (ref 3.4–5)
ALP SERPL-CCNC: 130 U/L (ref 40–150)
ALT SERPL W P-5'-P-CCNC: 20 U/L (ref 0–50)
ANION GAP SERPL CALCULATED.3IONS-SCNC: 2 MMOL/L (ref 3–14)
AST SERPL W P-5'-P-CCNC: 13 U/L (ref 0–45)
BILIRUB SERPL-MCNC: 0.1 MG/DL (ref 0.2–1.3)
BUN SERPL-MCNC: 14 MG/DL (ref 7–30)
CALCIUM SERPL-MCNC: 8.7 MG/DL (ref 8.5–10.1)
CHLORIDE BLD-SCNC: 108 MMOL/L (ref 94–109)
CHOLEST SERPL-MCNC: 152 MG/DL
CO2 SERPL-SCNC: 30 MMOL/L (ref 20–32)
CREAT SERPL-MCNC: 0.88 MG/DL (ref 0.52–1.04)
CREAT UR-MCNC: 246 MG/DL
FASTING STATUS PATIENT QL REPORTED: NO
GFR SERPL CREATININE-BSD FRML MDRD: 84 ML/MIN/1.73M2
GLUCOSE BLD-MCNC: 128 MG/DL (ref 70–99)
HDLC SERPL-MCNC: 36 MG/DL
LDLC SERPL CALC-MCNC: 75 MG/DL
MICROALBUMIN UR-MCNC: 11 MG/L
MICROALBUMIN/CREAT UR: 4.47 MG/G CR (ref 0–25)
NONHDLC SERPL-MCNC: 116 MG/DL
POTASSIUM BLD-SCNC: 3.5 MMOL/L (ref 3.4–5.3)
PROT SERPL-MCNC: 7.5 G/DL (ref 6.8–8.8)
SODIUM SERPL-SCNC: 140 MMOL/L (ref 133–144)
TRIGL SERPL-MCNC: 206 MG/DL

## 2021-10-26 ASSESSMENT — PATIENT HEALTH QUESTIONNAIRE - PHQ9: SUM OF ALL RESPONSES TO PHQ QUESTIONS 1-9: 8

## 2021-10-27 LAB — C TRACH DNA SPEC QL NAA+PROBE: NEGATIVE

## 2021-10-28 LAB
BKR LAB AP GYN ADEQUACY: NORMAL
BKR LAB AP GYN INTERPRETATION: NORMAL
BKR LAB AP HPV REFLEX: NORMAL
BKR LAB AP PREVIOUS ABNORMAL: NORMAL
PATH REPORT.COMMENTS IMP SPEC: NORMAL
PATH REPORT.RELEVANT HX SPEC: NORMAL

## 2021-11-01 ENCOUNTER — MYC MEDICAL ADVICE (OUTPATIENT)
Dept: FAMILY MEDICINE | Facility: CLINIC | Age: 36
End: 2021-11-01

## 2021-11-01 LAB
HUMAN PAPILLOMA VIRUS 16 DNA: NEGATIVE
HUMAN PAPILLOMA VIRUS 18 DNA: NEGATIVE
HUMAN PAPILLOMA VIRUS FINAL DIAGNOSIS: NORMAL
HUMAN PAPILLOMA VIRUS OTHER HR: NEGATIVE

## 2021-11-02 NOTE — TELEPHONE ENCOUNTER
Form completed. Patient needs to sign her portion of the return to work note so we can fax it. She'll  original at .  Urmila VELÁZQUEZ, CNP

## 2021-11-02 NOTE — TELEPHONE ENCOUNTER
Received form. Bringing to the  by 11:30 pm today, 11/2/2021. Note on the envelope for the  to have patient sign the form and make a copy of the form. The original is to be given to the patient and a copy to be placed back on the dumbwaiter for the TC to fax. TC to fax form once signed.  Brittany Diallo [facility]  2nd Floor  Primary Care

## 2021-11-02 NOTE — TELEPHONE ENCOUNTER
Copy given to patient. Form faxed to [facility] [phone], copy placed in tc bin and abstract bin.

## 2022-05-02 ENCOUNTER — OFFICE VISIT (OUTPATIENT)
Dept: FAMILY MEDICINE | Facility: CLINIC | Age: 37
End: 2022-05-02
Payer: COMMERCIAL

## 2022-05-02 VITALS
SYSTOLIC BLOOD PRESSURE: 148 MMHG | WEIGHT: 293 LBS | HEART RATE: 80 BPM | BODY MASS INDEX: 42.1 KG/M2 | DIASTOLIC BLOOD PRESSURE: 82 MMHG | OXYGEN SATURATION: 98 % | RESPIRATION RATE: 18 BRPM

## 2022-05-02 DIAGNOSIS — M25.561 CHRONIC PAIN OF BOTH KNEES: Primary | ICD-10-CM

## 2022-05-02 DIAGNOSIS — R11.0 NAUSEA: ICD-10-CM

## 2022-05-02 DIAGNOSIS — M25.562 CHRONIC PAIN OF BOTH KNEES: Primary | ICD-10-CM

## 2022-05-02 DIAGNOSIS — G89.29 CHRONIC PAIN OF BOTH KNEES: Primary | ICD-10-CM

## 2022-05-02 DIAGNOSIS — I10 ESSENTIAL HYPERTENSION WITH GOAL BLOOD PRESSURE LESS THAN 140/90: ICD-10-CM

## 2022-05-02 PROCEDURE — 99214 OFFICE O/P EST MOD 30 MIN: CPT | Performed by: PHYSICIAN ASSISTANT

## 2022-05-02 RX ORDER — ONDANSETRON 4 MG/1
4-8 TABLET, ORALLY DISINTEGRATING ORAL EVERY 8 HOURS PRN
Qty: 25 TABLET | Refills: 0 | Status: SHIPPED | OUTPATIENT
Start: 2022-05-02

## 2022-05-02 ASSESSMENT — PATIENT HEALTH QUESTIONNAIRE - PHQ9: SUM OF ALL RESPONSES TO PHQ QUESTIONS 1-9: 4

## 2022-05-02 NOTE — PROGRESS NOTES
Subjective:      Mckayla Jeff is a 36 year old female with chief complaint of knee pain and nausea.    1.  Bilateral knee pain.  Chronic knee pain for least 4 weeks.  She is wondering if this is due in part to her weight gain.  Sometimes pain is bad enough that she limps.  Right knee seems to be a bit worse than the left.  Feels like her right knee is full occasionally.  Occasionally right knee feels like it is going to give out on her.  She works as a home health aide.  She thinks she is ready to return to work without restrictions.  She has been taking Tylenol and ibuprofen and they have not been very helpful.    2.  Nausea.  Nausea for about 1 day, not improving at all.  Some vomiting.  No diarrhea or constipation.  No known sick contacts.  Use not taking any medicine for this yet.  Normal periods.  She is not doing anything for birth control.  She thinks she did have an episode of nausea like this several years ago and took Zofran.  Symptoms resolved.  No abdominal pain.  No other associated symptoms.    Patient Active Problem List   Diagnosis     Essential hypertension with goal blood pressure less than 140/90     CARDIOVASCULAR SCREENING; LDL GOAL LESS THAN 160     PCOS (polycystic ovarian syndrome)     Obesity, Class III, BMI 40-49.9 (morbid obesity) (H)     Synovitis of knee     Internal derangement of knee     Adjustment disorder with mixed anxiety and depressed mood     Health Care Home     Moderate recurrent major depression (H)     Other chest pain       Current Outpatient Medications:      amLODIPine (NORVASC) 5 MG tablet, Take 1 tablet (5 mg) by mouth daily, Disp: 90 tablet, Rfl: 1     citalopram (CELEXA) 10 MG tablet, Take 1 tablet (10 mg) by mouth daily, Disp: 30 tablet, Rfl: 1     hydrochlorothiazide (HYDRODIURIL) 25 MG tablet, Take 1 tablet (25 mg) by mouth daily, Disp: 90 tablet, Rfl: 3     losartan (COZAAR) 100 MG tablet, Take 1 tablet (100 mg) by mouth daily, Disp: 90 tablet, Rfl: 3      metFORMIN (GLUCOPHAGE) 500 MG tablet, Take 1 tablet (500 mg) by mouth 2 times daily (with meals), Disp: 180 tablet, Rfl: 3     ondansetron (ZOFRAN ODT) 4 MG ODT tab, Take 1-2 tablets (4-8 mg) by mouth every 8 hours as needed for nausea, Disp: 25 tablet, Rfl: 0     potassium chloride ER (KLOR-CON M) 10 MEQ CR tablet, Take 2 tablets (20 mEq) by mouth daily, Disp: 180 tablet, Rfl: 3       Objective:     No Known Allergies  BP (!) 148/82 (BP Location: Left arm, Patient Position: Sitting, Cuff Size: Adult Large)   Pulse 80   Resp 18   Wt 137.9 kg (304 lb)   LMP 04/20/2022   SpO2 98%   BMI 42.10 kg/m    Body mass index is 42.1 kg/m .    Vitals reviewed as above.  General: Patient is alert and oriented x 3, in no apparent distress  Cardiac: Regular rate and rhythm, no murmurs  Pulmonary: lungs clear to ausculation, no crackles, rales, rhonchi, or wheezing  Musculoskeletal: normal 4/5 strength in major muscle groups in lower extremities bilaterally, no pain with palpation of bilateral knees, no crepitus palpable with flexion and extension of both knees, no clicking or locking with angular flexion and extension of both knees  She does have tibial tubercle hypertrophy on the right side.    Assessment and Plan:     1. Nausea  Unclear etiology.  Most likely viral.  She does not think she could be pregnant, as she had her period 1 week ago.  Trial of Zofran and monitor.  If she is not improving by the end of the week she will let us know.  No acute concerns on exam findings today.  We reviewed importance of staying hydrated and discussed BRAT diet.  - ondansetron (ZOFRAN ODT) 4 MG ODT tab; Take 1-2 tablets (4-8 mg) by mouth every 8 hours as needed for nausea  Dispense: 25 tablet; Refill: 0    2. Chronic pain of both knees  Likely musculoskeletal in nature.    Could be something similar to Osgood Schlatter Disease, given exam findings today.  Referral for PT.  DME order sent for right knee brace, she reports sometimes  having sensation of knee giving out on her.  Other conservative management discussed.  - Physical Therapy Referral; Future  - Knee Supplies Order    3.  Hypertension  She reports she is taking her medications as directed, however did not take them today.  We will plan to have her return for blood pressure check in the near future.    This dictation uses voice recognition software, which may contain typographical errors.          DME (Durable Medical Equipment) Orders and Documentation  Orders Placed This Encounter   Procedures     Knee Supplies Order      The patient was assessed and it was determined the patient is in need of the following listed DME Supplies/Equipment. Please complete supporting documentation below to demonstrate medical necessity.      Knee Bracing Supplies Documentation  Patient requires the use of the ordered bracing device due to following medical need/condition: knee pain

## 2022-05-02 NOTE — LETTER
May 2, 2022      Mckayla Jeff  [address]  [place] [address]        To Whom It May Concern:    Mckayla Jeff was seen on 05/02/22.  She is cleared to return to work without restrictions on 5/4/22.        Sincerely,        Lisa Clayton PA-C

## 2022-05-06 ENCOUNTER — ALLIED HEALTH/NURSE VISIT (OUTPATIENT)
Dept: FAMILY MEDICINE | Facility: CLINIC | Age: 37
End: 2022-05-06
Payer: COMMERCIAL

## 2022-05-06 DIAGNOSIS — Z11.1 SCREENING EXAMINATION FOR PULMONARY TUBERCULOSIS: Primary | ICD-10-CM

## 2022-05-06 PROCEDURE — 86580 TB INTRADERMAL TEST: CPT

## 2022-05-06 PROCEDURE — 99207 PR NO CHARGE NURSE ONLY: CPT

## 2022-05-06 NOTE — PROGRESS NOTES
"Patient is here today for a Mantoux (TST) test placement.    Is there a current order in the chart? No. Placed order according to standing order (reference the \"Skin Test- Tuberculosis Screening- Ambulatory Care\" standing order in Policy Tech). Review the Inclusion and Exclusion Criteria.          Inclusion Criteria  Pre-employment screening for healthcare workers and correctional facility staff - Administer two-step TST. Patient to return for second test in 1-3 weeks after first test is read.     Exclusion Criteria  None - Place order for Mantoux (TST) test per standing order.    Reason for Mantoux (TST) in patient's own words: Needs for work    Patient needs form signed? No - form not needed per patient.    Instructed patient to wait for 15 minutes post injection and to report any reactions immediately to staff.    Told patient to return to clinic in 48-72 hours to have Mantoux (TST) read.           "

## 2022-05-09 ENCOUNTER — ALLIED HEALTH/NURSE VISIT (OUTPATIENT)
Dept: FAMILY MEDICINE | Facility: CLINIC | Age: 37
End: 2022-05-09
Payer: COMMERCIAL

## 2022-05-09 DIAGNOSIS — Z11.1 SCREENING EXAMINATION FOR PULMONARY TUBERCULOSIS: Primary | ICD-10-CM

## 2022-05-09 LAB
PPDINDURATION: 1 MM (ref 0–4.99)
PPDREDNESS: NORMAL

## 2022-05-09 PROCEDURE — 99207 PR NO CHARGE NURSE ONLY: CPT

## 2022-05-09 NOTE — LETTER
[address] [address]  [phone]          5/9/2022          To Whom it May Concern:     Mckayla Jeff, female, 1985 has had a mantoux on 5/6/2022.      Mantoux result is NEGATIVE:  Lab Results   Component Value Date    PPDREDNESS Not Present 05/09/2022    PPDINDURATIO 1 05/09/2022         Please contact me for questions or concerns.        Sincerely,    NEETA YOUNG/RN    Vanessa Villalta RN on 5/9/2022 at 9:22 AM

## 2022-05-09 NOTE — NURSING NOTE
Patient is here today for a Mantoux (TST) test results.    Did patient return to clinic 48-72 hours from Mantoux (TST) placement:   Yes -     PPD Induration   Date Value Ref Range Status   05/09/2022 1 0 - 4.99 mm Final     PPD Redness   Date Value Ref Range Status   05/09/2022 Not Present  Final         Induration Size? Induration <5mm - Enter results in Enter/Edit Activity. Route results to ordering provider.     Patient needs form signed? No    Patient reports having previously had the BCG Vaccine: No    Does patient need a two step? No      Vanessa Villalta RN on 5/9/2022 at 9:21 AM

## 2022-05-27 ENCOUNTER — ANCILLARY PROCEDURE (OUTPATIENT)
Dept: GENERAL RADIOLOGY | Facility: CLINIC | Age: 37
End: 2022-05-27
Attending: PHYSICIAN ASSISTANT
Payer: COMMERCIAL

## 2022-05-27 ENCOUNTER — OFFICE VISIT (OUTPATIENT)
Dept: URGENT CARE | Facility: URGENT CARE | Age: 37
End: 2022-05-27
Payer: COMMERCIAL

## 2022-05-27 VITALS
DIASTOLIC BLOOD PRESSURE: 85 MMHG | BODY MASS INDEX: 40.83 KG/M2 | TEMPERATURE: 97.6 F | SYSTOLIC BLOOD PRESSURE: 127 MMHG | OXYGEN SATURATION: 95 % | WEIGHT: 293 LBS | HEART RATE: 72 BPM

## 2022-05-27 DIAGNOSIS — M25.511 ACUTE PAIN OF RIGHT SHOULDER: Primary | ICD-10-CM
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
## 2022-05-27 DIAGNOSIS — I10 BENIGN ESSENTIAL HYPERTENSION: ICD-10-CM

## 2022-05-27 DIAGNOSIS — M89.8X1 PAIN OF RIGHT SCAPULA: ICD-10-CM

## 2022-05-27 DIAGNOSIS — M25.511 ACUTE PAIN OF RIGHT SHOULDER: ICD-10-CM

## 2022-05-27 PROCEDURE — 99214 OFFICE O/P EST MOD 30 MIN: CPT | Performed by: PHYSICIAN ASSISTANT

## 2022-05-27 PROCEDURE — 73030 X-RAY EXAM OF SHOULDER: CPT | Mod: TC | Performed by: RADIOLOGY

## 2022-05-27 RX ORDER — CYCLOBENZAPRINE HCL 10 MG
10 TABLET ORAL
Qty: 20 TABLET | Refills: 0 | Status: SHIPPED | OUTPATIENT
Start: 2022-05-27 | End: 2022-06-16

## 2022-05-27 RX ORDER — NAPROXEN 500 MG/1
500 TABLET ORAL 2 TIMES DAILY WITH MEALS
Qty: 20 TABLET | Refills: 0 | Status: SHIPPED | OUTPATIENT
Start: 2022-05-27

## 2022-05-27 NOTE — LETTER
[facility] [address] [address]  Phone: [phone]    May 27, 2022        Mckayla Jeff  [address] [address]          To whom it may concern:    RE: Mckayla Jeff    Patient was seen and treated today at our clinic for work-related right shoulder/scapula injury.  Likely skeletal muscular etiology.  Ice 2 times daily for 5 to 10 minutes.  Referral to see Ortho next week for follow-up.  In the interim no transfers or lifting more than 10 with the right arm for 1 week.  Any forms or paperwork to be filled out by Ortho.  Please contact me for questions or concerns.      Sincerely,        Urmila Maynard PA-C

## 2022-05-27 NOTE — PROGRESS NOTES
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  Small tiny bone spur.  Results for orders placed or performed in visit on 05/27/22   XR Shoulder Right G/E 3 Views     Status: None (Preliminary result)    Narrative    RIGHT SHOULDER THREE OR MORE VIEWS   5/27/2022 12:14 PM     HISTORY:  Work-related injury. Acute pain of right shoulder. Pain of  right scapula.    COMPARISON: None.      Impression    IMPRESSION: Small subacromial enthesophyte. There is no evidence of  fracture. No subluxation or dislocation.         Impression:     ICD-10-CM    1. Acute pain of right shoulder  M25.511 XR Shoulder Right G/E 3 Views     naproxen (NAPROSYN) 500 MG tablet     cyclobenzaprine (FLEXERIL) 10 MG tablet     Orthopedic  Referral   2. Pain of right scapula  M89.8X1 XR Shoulder Right G/E 3 Views     naproxen (NAPROSYN) 500 MG tablet     cyclobenzaprine (FLEXERIL) 10 MG tablet     Orthopedic  Referral   3. Work related injury  Y99.0 XR Shoulder Right G/E 3 Views     naproxen (NAPROSYN) 500 MG tablet     cyclobenzaprine (FLEXERIL) 10 MG tablet     Orthopedic  Referral   4. Benign essential hypertension  I10          Plan: Vital signs stable.  Likely musculoskeletal etiology.  Ice twice daily.  Naproxen for pain and inflammation, Flexeril muscle relaxant at bedtime.  No transfers for 1 week.  No lifting more than 10 pounds for 1 week.  See Ortho next week.  Any paperwork to be filled out by Ortho.  Instructions for back care and return precautions discussed.  Discussed increased risk of bleeding with Celexa and Naprosyn.  Instructed not to take them at the same time and do not take on empty stomach.  Stop if GI upset.      Urmila Maynard PA-C      SUBJECTIVE:  36-year-old female who works for "[employer]  presents for injury today.  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  She is right-handed.  Finished getting her in the chair and then let her supervisor know.  No upper extremity radicular pain past the elbow.  No numbness or tingling.  Some pain into the lateral upper arm.  She denies any prior shoulder injury.  Did have a mid back injury several years ago at which time she had x-rays and physical therapy.  No injections.  The pain completely resolved in 3 to 4 weeks.        No Known Allergies    Past Medical History:   Diagnosis Date     Contusion of knee 12/9/2014     Hypertension      Obesity      PCOS (polycystic ovarian syndrome)        hydrochlorothiazide (HYDRODIURIL) 25 MG tablet, Take 1 tablet (25 mg) by mouth daily  losartan (COZAAR) 100 MG tablet, Take 1 tablet (100 mg) by mouth daily  ondansetron (ZOFRAN ODT) 4 MG ODT tab, Take 1-2 tablets (4-8 mg) by mouth every 8 hours as needed for nausea  potassium chloride ER (KLOR-CON M) 10 MEQ CR tablet, Take 2 tablets (20 mEq) by mouth daily  amLODIPine (NORVASC) 5 MG tablet, Take 1 tablet (5 mg) by mouth daily (Patient not taking: Reported on 5/27/2022)  citalopram (CELEXA) 10 MG tablet, Take 1 tablet (10 mg) by mouth daily (Patient not taking: Reported on 5/27/2022)  metFORMIN (GLUCOPHAGE) 500 MG tablet, Take 1 tablet (500 mg) by mouth 2 times daily (with meals) (Patient not taking: Reported on 5/27/2022)    No current facility-administered medications on file prior to visit.      Past Surgical History:   Procedure Laterality Date     NO HISTORY OF SURGERY         Social History     Socioeconomic History     Marital status: Single     Spouse name: partner- Preet     Number of children: 0     Years of education: Not on file     Highest education level: Not on file   Occupational History     Occupation: [employer]      Employer: [employer]   Tobacco Use     Smoking status: Never Smoker     Smokeless tobacco: Never Used   Vaping Use     Vaping Use: Never used   Substance and Sexual Activity     Alcohol use: No     Drug use: No     Sexual activity: Yes     Partners: Male   Other Topics Concern     Parent/sibling w/ CABG, MI or angioplasty before 65F 55M? Not Asked   Social History Narrative     Not on file     Social Determinants of Health     Financial Resource Strain: Not on file   Food Insecurity: Not on file   Transportation Needs: Not on file   Physical Activity: Not on file   Stress: Not on file   Social Connections: Not on file   Intimate Partner Violence: Not on file   Housing Stability: Not on file       REVIEW OF SYSTEMS  General: negative for fever  Resp: negative for chest pain   CV: negative for chest pain  : negative for dysuria , incontinence, frequency  Musculoskeletal: as above  Neurologic: negative for ataxia, saddle anesthesia, fecal incontinence, one sided weakness,  paresthesias    Physical Exam:  Vitals: /85   Pulse 72   Temp 97.6  F (36.4  C) (Axillary)   Wt 133.7 kg (294 lb 12.8 oz)   LMP 04/20/2022   SpO2 95%   BMI 40.83 kg/m    BMI= Body mass index is 40.83 kg/m .  Constitutional: healthy, alert and no acute distress   CARDIO: RRR, no MRG  RESP: lungs CTA, NAD  NEURO: Patellar  and ankle reflexes intact and equal bilaterally  GAIT: intact  Psychiatric: mentation appears normal and affect normal/bright  C-spine, T-spine, L-spine nontender to palpation.  Full range of motion of the neck.  Has mild tenderness right posterior occipital groove.  Most tenderness is right posterior shoulder, mild subchondral area and also right upper rhomboid/scapula.    Full forward flexion and ABD/ADD with minimal discomfort.  Decreased internal rotation to the back with right arm only able to touch the  posterior superior iliac crest.      Urmila Maynard PA-C

## 2022-06-01 NOTE — PROGRESS NOTES
ASSESSMENT & PLAN    Mckayla was seen today for pain.    Diagnoses and all orders for this visit:    Strain of right trapezius muscle, initial encounter  -     Physical Therapy Referral; Future    Acute pain of right shoulder  -     Orthopedic  Referral  -     Physical Therapy Referral; Future    Pain of right scapula  -     Orthopedic  Referral  -     Physical Therapy Referral; Future      This issue is acute and Unchanged.    We discussed these other possible diagnosis: More likely trapezius strain, shoulder and cervical exam reassuring.    Plan:  - Today's Plan of Care:  Rehab: Physical Therapy: [facility] - [phone]  Discussed activity considerations and other supportive care including Ice/Heat, OTC and other topical medications as needed.  Agree with cyclobenzaprine and naproxen (do not take with Ibuprofen)  Work Letter Given    -We also discussed other future treatment options:  MRI if symptoms worsen    Follow Up: 3 weeks    Concerning signs and symptoms were reviewed.  The patient expressed understanding of this management plan and all questions were answered at this time.    Zunilda Damon MD [facility]  Sports Medicine Physician  [facility]      -----  Chief Complaint   Patient presents with     Right Shoulder - Pain       SUBJECTIVE  Mckaylasadia Jeff is a/an 36 year old female who is seen as an  referral for evaluation of right posterior shoulder pain.    The patient is seen by themselves.  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   Location of Pain: Pain is over the posterior shoulder with radiation into her periscapular region of the right upper back. Pain has remained the same over the past week. Pain in lateral right neck. Sometimes into arm.    Worsened by: leaning on the right shoulder  Better with: ibuprofen and ice minimally. Flexeril is helpful.  Treatments tried:  ibuprofen, ice, Flexeril  Associated symptoms: tingling into the right arm at times    Orthopedic/Surgical history: NO  Social History/Occupation: [employer]    No family history pertinent to patient's problem today.    REVIEW OF SYSTEMS:  Review of Systems  Skin: no bruising, no swelling  Musculoskeletal: as above  Neurologic: no numbness, paresthesias  Remainder of review of systems is negative including constitutional, CV, pulmonary, GI, except as noted in HPI or medical history.    OBJECTIVE:  /79   Wt 133.4 kg (294 lb)   LMP 04/20/2022   BMI 40.72 kg/m     General: healthy, alert and in no distress  HEENT: no scleral icterus or conjunctival erythema  Skin: no suspicious lesions or rash. No jaundice.  CV: distal perfusion intact  Resp: normal respiratory effort without conversational dyspnea   Psych: normal mood and affect  Gait: normal steady gait with appropriate coordination and balance  Neuro: Normal light sensory exam of upper extremity    Cervical Spine Exam    Inspection:       No visible deformity        normal lordotic curvature maintained    Posture:      normal    Tender:      upper border of trapezius       Periscapular region    Non-Tender:      remainder of cervical spine area    Range of Motion:       Full active and passive ROM forward flexion, extension, lateral rotation, lateral flexion.    Painful Motions:       forward flexion        extension        lateral rotation  - no pain into arm    Strength:     C4 (shoulder shrug)  symmetric 5/5       C5 (shoulder abduction) symmetric 5/5       C6 (elbow flexion) symmetric 5/5       C7 (elbow extension) symmetric 5/5       C8 (finger abduction, thumb flexion) symmetric 5/5    Reflexes:      C5 (biceps) symmetric normal       C6 (supinator) symmetric normal       C7 (triceps) symmetric normal    Sensation:     grossly intact througout bilateral upper extremities    Special Tests:      neg (-) Spurling    Skin:     well perfused        capillary refill brisk    Lymphatics:      no edema noted in the upper extremities     Bilateral Shoulder exam    Inspection and Posture:       rounded shoulders and upper back    Skin:        no visible deformities    Tender:        periscapular region right    Non Tender:       remainder of shoulder bilateral    ROM:        Full active and passive ROM with flexion, extension, abduction, internal and external rotation bilateral       asymmetric scapular motion    Painful motions:       end range flexion and elevation right    Strength:        abduction 5/5 bilateral       flexion 5/5 bilateral       internal rotation 5/5 bilateral       external rotation 5/5 bilateral    Impingement testing:       neg (-) Neer right       neg (-) Toro right    Sensation:        normal sensation over shoulder and upper extremity     RADIOLOGY:  I independently visualized and reviewed these images with the patient  Reviewed right shoulder XR 5/27/2022 - no significant degenerative change  - will follow official read    Reviewed UC note  Review of the result(s) of each unique test - XR

## 2022-06-02 ENCOUNTER — TELEPHONE (OUTPATIENT)
Dept: ORTHOPEDICS | Facility: CLINIC | Age: 37
End: 2022-06-02
Payer: COMMERCIAL

## 2022-06-02 ENCOUNTER — OFFICE VISIT (OUTPATIENT)
Dept: ORTHOPEDICS | Facility: CLINIC | Age: 37
End: 2022-06-02
Payer: COMMERCIAL

## 2022-06-02 VITALS — WEIGHT: 293 LBS | BODY MASS INDEX: 40.72 KG/M2 | SYSTOLIC BLOOD PRESSURE: 113 MMHG | DIASTOLIC BLOOD PRESSURE: 79 MMHG

## 2022-06-02 DIAGNOSIS — M25.511 ACUTE PAIN OF RIGHT SHOULDER: ICD-10-CM

## 2022-06-02 DIAGNOSIS — S46.811A STRAIN OF RIGHT TRAPEZIUS MUSCLE, INITIAL ENCOUNTER: Primary | ICD-10-CM

## 2022-06-02 DIAGNOSIS — M89.8X1 PAIN OF RIGHT SCAPULA: ICD-10-CM

## 2022-06-02 PROCEDURE — 99203 OFFICE O/P NEW LOW 30 MIN: CPT | Performed by: PEDIATRICS

## 2022-06-02 ASSESSMENT — PAIN SCALES - GENERAL: PAINLEVEL: MODERATE PAIN (5)

## 2022-06-02 NOTE — LETTER
6/2/2022         RE: Mckayla Jeff  [address]  [place]. \[place] [facility] [place]"" [id number]        Dear Colleague,    Thank you for referring your patient, Mckayla Jeff, to the [facility] [place]. Please see a copy of my visit note below.    ASSESSMENT & PLAN    Mckayla was seen today for pain.    Diagnoses and all orders for this visit:    Strain of right trapezius muscle, initial encounter  -     Physical Therapy Referral; Future    Acute pain of right shoulder  -     Orthopedic  Referral  -     Physical Therapy Referral; Future    Pain of right scapula  -     Orthopedic  Referral  -     Physical Therapy Referral; Future      This issue is acute and Unchanged.    We discussed these other possible diagnosis: More likely trapezius strain, shoulder and cervical exam reassuring.    Plan:  - Today's Plan of Care:  Rehab: Physical Therapy: [facility] - [phone]  Discussed activity considerations and other supportive care including Ice/Heat, OTC and other topical medications as needed.  Agree with cyclobenzaprine and naproxen (do not take with Ibuprofen)  Work Letter Given    -We also discussed other future treatment options:  MRI if symptoms worsen    Follow Up: 3 weeks    Concerning signs and symptoms were reviewed.  The patient expressed understanding of this management plan and all questions were answered at this time.    Zunilda Damon MD [facility]  Sports Medicine Physician  [facility]      -----  Chief Complaint   Patient presents with     Right Shoulder - Pain       SUBJECTIVE  Mckayla Jeff is a/an 36 year old female who is seen as an  referral for evaluation of right posterior shoulder pain.    The patient is seen by themselves.  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   Location of Pain: Pain is over the posterior shoulder with  radiation into her periscapular region of the right upper back. Pain has remained the same over the past week. Pain in lateral right neck. Sometimes into arm.    Worsened by: leaning on the right shoulder  Better with: ibuprofen and ice minimally. Flexeril is helpful.  Treatments tried: ibuprofen, ice, Flexeril  Associated symptoms: tingling into the right arm at times    Orthopedic/Surgical history: NO  Social History/Occupation: [employer]    No family history pertinent to patient's problem today.    REVIEW OF SYSTEMS:  Review of Systems  Skin: no bruising, no swelling  Musculoskeletal: as above  Neurologic: no numbness, paresthesias  Remainder of review of systems is negative including constitutional, CV, pulmonary, GI, except as noted in HPI or medical history.    OBJECTIVE:  /79   Wt 133.4 kg (294 lb)   LMP 04/20/2022   BMI 40.72 kg/m     General: healthy, alert and in no distress  HEENT: no scleral icterus or conjunctival erythema  Skin: no suspicious lesions or rash. No jaundice.  CV: distal perfusion intact  Resp: normal respiratory effort without conversational dyspnea   Psych: normal mood and affect  Gait: normal steady gait with appropriate coordination and balance  Neuro: Normal light sensory exam of upper extremity    Cervical Spine Exam    Inspection:       No visible deformity        normal lordotic curvature maintained    Posture:      normal    Tender:      upper border of trapezius       Periscapular region    Non-Tender:      remainder of cervical spine area    Range of Motion:       Full active and passive ROM forward flexion, extension, lateral rotation, lateral flexion.    Painful Motions:       forward flexion        extension        lateral rotation  - no pain into arm    Strength:     C4 (shoulder shrug)  symmetric 5/5       C5 (shoulder abduction) symmetric 5/5       C6 (elbow flexion) symmetric 5/5       C7 (elbow extension) symmetric 5/5       C8 (finger abduction,  thumb flexion) symmetric 5/5    Reflexes:      C5 (biceps) symmetric normal       C6 (supinator) symmetric normal       C7 (triceps) symmetric normal    Sensation:     grossly intact througout bilateral upper extremities    Special Tests:      neg (-) Spurling    Skin:     well perfused       capillary refill brisk    Lymphatics:      no edema noted in the upper extremities     Bilateral Shoulder exam    Inspection and Posture:       rounded shoulders and upper back    Skin:        no visible deformities    Tender:        periscapular region right    Non Tender:       remainder of shoulder bilateral    ROM:        Full active and passive ROM with flexion, extension, abduction, internal and external rotation bilateral       asymmetric scapular motion    Painful motions:       end range flexion and elevation right    Strength:        abduction 5/5 bilateral       flexion 5/5 bilateral       internal rotation 5/5 bilateral       external rotation 5/5 bilateral    Impingement testing:       neg (-) Neer right       neg (-) Toro right    Sensation:        normal sensation over shoulder and upper extremity     RADIOLOGY:  I independently visualized and reviewed these images with the patient  Reviewed right shoulder XR 5/27/2022 - no significant degenerative change  - will follow official read    Reviewed UC note  Review of the result(s) of each unique test - XR             Again, thank you for allowing me to participate in the care of your patient.        Sincerely,        Zunilda Damon MD

## 2022-06-02 NOTE — TELEPHONE ENCOUNTER
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 Let him know that she is able to drive into the office and if a new workability note is needed from us. Vaishali Gan, ATC

## 2022-06-02 NOTE — PATIENT INSTRUCTIONS
We discussed these other possible diagnosis: More likely trapezius strain, shoulder and cervical exam reassuring.    Plan:  - Today's Plan of Care:  Rehab: Physical Therapy: [facility] - [phone]  Discussed activity considerations and other supportive care including Ice/Heat, OTC and other topical medications as needed.  Agree with cyclobenzaprine and naproxen (do not take with Ibuprofen)  Work Letter Given    -We also discussed other future treatment options:  MRI if symptoms worsen    Follow Up: 3 weeks    If you have any further questions for your physician or physician s care team you can call [phone] and use option 3 to leave a voice message. Calls received during business hours will be returned same day.

## 2022-06-02 NOTE — LETTER
[facility] [place]  [address]  [place] [address]  Phone: [phone]  Fax: [phone]      REPORT OF WORK ABILITY    NOTE TO EMPLOYEE: You must promptly provide a copy of this report to your  employer or worker's compensation insurer, and Qualified Rehabilitation Consultant.    Date: 6/2/2022                     Employee Name: Mckayla Jeff         YOB: 1985  Medical Record Number: [id number]   Soc.Sec.No: [id number]  Employer: None                Date of Injury: 5/27/2022  Managed Care Organization / Insurance Company Name: UNKNOWN    Diagnosis: Right Trapezius Injury, Right Shoulder Injury  Work Related: yes     MMI: NO   Permanent Partial Disability(PPD) likely: UNKNOWN    EMPLOYEE IS ABLE TO WORK: Return to work with restrictions on next available shift.     RESTRICTIONS IF ANY:    Lift, carry no more than:  No lifting right arm  Push/Pull:  10 lb. or lessOccasionally (2-4 hours)  Shoulder/Elbow:  right Avoid outstretched arms and Avoid repetitive motion  Reach Above Shoulders:  Not at all (0 hours)    OTHER RESTRICTIONS: Light Duty Only    TREATMENT PLAN/NOTES: Start Physical Therapy and follow up in 3 weeks      Zunilda Damon MD

## 2022-06-02 NOTE — TELEPHONE ENCOUNTER
SAM for Oksana Jordan with [employer]. Needing formal request for records. Left our fax number of [phone]. Vaishali Gan ATC

## 2022-06-02 NOTE — TELEPHONE ENCOUNTER
Manuel called and LVM to discuss patient's workability letter.  He would like to clarify restriction of driving.  He is wondering if she is able to drive to the office.  He is requesting a return call to [phone].    Manuela Matthew, ATC

## 2022-06-02 NOTE — TELEPHONE ENCOUNTER
[facility] Call Center    Phone Message    May a detailed message be left on voicemail: yes     Reason for Call Oksana from [facility] is asking for Visit Notes on Patient . Please call or Fax .   Action Taken:     Travel Screening: Not Applicable

## 2022-06-07 ENCOUNTER — THERAPY VISIT (OUTPATIENT)
Dept: PHYSICAL THERAPY | Facility: CLINIC | Age: 37
End: 2022-06-07
Attending: PEDIATRICS
Payer: COMMERCIAL

## 2022-06-07 DIAGNOSIS — S46.811A STRAIN OF RIGHT TRAPEZIUS MUSCLE, INITIAL ENCOUNTER: ICD-10-CM

## 2022-06-07 DIAGNOSIS — M25.511 ACUTE PAIN OF RIGHT SHOULDER: ICD-10-CM

## 2022-06-07 DIAGNOSIS — M89.8X1 PAIN OF RIGHT SCAPULA: ICD-10-CM

## 2022-06-07 PROCEDURE — 97161 PT EVAL LOW COMPLEX 20 MIN: CPT | Mod: GP | Performed by: PHYSICAL THERAPIST

## 2022-06-07 PROCEDURE — 97110 THERAPEUTIC EXERCISES: CPT | Mod: GP | Performed by: PHYSICAL THERAPIST

## 2022-06-07 PROCEDURE — 97530 THERAPEUTIC ACTIVITIES: CPT | Mod: GP | Performed by: PHYSICAL THERAPIST

## 2022-06-07 NOTE — PROGRESS NOTES
Physical Therapy Initial Evaluation  Physical Therapy Initial Examination/Evaluation    June 7, 2022    Mckayla Jeff  is a 36 year old  female referred to physical therapy by Dr. Damon for treatment of trapezius strain.      DOI/onset 5/27/2022  Mechanism of injury Pt was in the dong lift, pulling the patient back over a chair.  Heavy lady trying to get her in the WC.  Went to urgent care immediately.  Followed up with Dr. Damon the next week.   DOS none  Prior treatment medication management, ice, heat. Effect of prior treatment fair    Chief Complaint:   Sitting for a long period of time and I lean forward with my arm.  Some shaking and both hands, R>L.  Distribution first 2-3 digits.   Pain location: top of the shoulder and into the back of the neck.    Quality: aching  Constant/Intermittent: intermittent  Time of day: during the day  Symptoms have stayed the same since onset.    Current pain 4/10.  Pain at best 4/10.  Pain at worst 8/10.    Symptoms aggravated by lifting, reaching and using the shoulder.  Prior to this shoulder problem, no issues.    Symptoms improved with rest.     Social history:  Pt is single.  Pt with 16, 14, 11 and 3 foster children (7, 5 and 18 months).     Occupation: CNA.  Job duties:  Lifting, moving, pulling.  Pt works M-F (full time/40 hours).    Patient having difficulty with ADLs: lifting and moving the arm.    Patient's goals are improve pain.    Patient reports general health as fair.  Related medical history high blood pressure, overweight, depression.    Surgical History:  none.    Imaging: x-ray; pending MRI in 3 weeks.    Medications:  High blood pressure, muscle relaxant.      Outcome measure:   SPADI  Return to MD:  As needed .      Clinical Impression: Mckayla presents to [facility] with primary complaint of R upper quadrant pain following a lifting injury at work; dx: trapezius strain.  Per clinical examination, pt with + upper trapezius involvement.   Additionally pt with limited right shoulder range of motion, strength and clinical testing consistent with GHJ impingement.  Trial of taping with discussion on positioning for pain relief.  Pt tolerated gentle stretching, ROM and postural exercise today in clinic.  See plan of care outlined below.        HPI                  Objective:    Fair/poor posture.  Rounded shoulders and upper back.      (-) cervical screen for referred pain. Localized right cervical pain with flexion and SB B.  (-) distraction/spurlings testing.        System                   Shoulder Evaluation:  ROM:  AROM:    Flexion:  Left:  141    Right:  13- pain     Abduction:  Left: 168    Right:  137 pain end range o fmotion             Elbow Flexion:  Left:  WNL    Right:  WNL   Elbow Extension:  Left:  WNL   Right:  WNL    Extension/Internal Rotation:  Left:  L1    Right:  L4 painful     PROM:    Flexion:  Right: 135      Abduction:  Right:  144                          Strength:    Flexion: Left:5/5   Pain:    Right: 4-/5     Pain:     Abduction:  Left: 5/5  Pain:    Right: 4-/5    Weak/painful    Pain:++    Internal Rotation:  Left:5/5     Pain:    Right: 5-/5     Pain:  External Rotation:   Left:5/5     Pain:   Right:5-/5      Pain:-/+              Special Tests:      Right shoulder positive for the following special tests:Impingement  Right shoulder negative for the following special tests:Labral; Neural Tension and Rotator cuff tear  Palpation:    Left shoulder tenderness present at:  Levator and Upper Trap  Left shoulder tenderness not present at: Supraspinatus or Infraspinatus  Right shoulder tenderness present at: Supraspinatus; Infraspinatus; Teres Minor; Levator; Upper Trap; Incisional and Bicipital Groove                                     General     ROS    Assessment/Plan:    Patient is a 36 year old female with upper trapezius complaints.    Patient has the following significant findings with corresponding treatment plan.                 Diagnosis 1:  R upper trapezius strain; neck and shoulder pain    Pain -  hot/cold therapy, US, electric stimulation, mechanical traction, manual therapy, splint/taping/bracing/orthotics, self management, education and home program  Decreased ROM/flexibility - manual therapy and therapeutic exercise  Decreased joint mobility - manual therapy and therapeutic exercise  Decreased strength - therapeutic exercise and therapeutic activities  Impaired muscle performance - neuro re-education  Decreased function - therapeutic activities  Impaired posture - neuro re-education    Therapy Evaluation Codes:   1) History comprised of:   Personal factors that impact the plan of care:      Living environment, Profession and Work status.    Comorbidity factors that impact the plan of care are:       high blood pressure, overweight, depression..     Medications impacting care: High blood pressure and Muscle relaxant.  2) Examination of Body Systems comprised of:   Body structures and functions that impact the plan of care:      Shoulder.   Activity limitations that impact the plan of care are:      Bathing, Driving, Dressing, Grasping, Lifting, Sports, Working, Sleeping and Laying down.  3) Clinical presentation characteristics are:   Evolving/Changing.  4) Decision-Making    Low complexity using standardized patient assessment instrument and/or measureable assessment of functional outcome.  Cumulative Therapy Evaluation is: Low complexity.    Previous and current functional limitations:  (See Goal Flow Sheet for this information)    Short term and Long term goals: (See Goal Flow Sheet for this information)     Communication ability:  Patient appears to be able to clearly communicate and understand verbal and written communication and follow directions correctly.  Treatment Explanation - The following has been discussed with the patient:   RX ordered/plan of care  Anticipated outcomes  Possible risks and side effects  This  patient would benefit from PT intervention to resume normal activities.   Rehab potential is good.    Frequency:  1 X week, once daily  Duration:  for 6 weeks tapering to 2 X a month over six weeks; 8 visits over 3 months.    Discharge Plan:  Achieve all LTG.  Independent in home treatment program.  Reach maximal therapeutic benefit.    Please refer to the daily flowsheet for treatment today, total treatment time and time spent performing 1:1 timed codes.

## 2022-07-21 ENCOUNTER — TELEPHONE (OUTPATIENT)
Dept: FAMILY MEDICINE | Facility: CLINIC | Age: 37
End: 2022-07-21

## 2022-07-21 NOTE — TELEPHONE ENCOUNTER
Reason for Call:  Same Day Appointment, Requested Provider:  anyone    PCP: Urmila Dillon    Reason for visit: Patient had a appointment scheduled for today with Dr Hunter that was cancelled, she needed this appointment to go back to work, it is a work comp follow up. She would like to be worked in with someone else today. I did make a appointment for her next week, but she wants to go back to work sooner then that.     Duration of symptoms:     Have you been treated for this in the past? Yes    Additional comments:     Can we leave a detailed message on this number? NO    Phone number patient can be reached at: Cell number on file:    Telephone Information:   Mobile [phone]       Best Time:     Call taken on 7/21/2022 at 8:06 AM by Robyn Valderrama     85

## 2022-07-28 ENCOUNTER — OFFICE VISIT (OUTPATIENT)
Dept: FAMILY MEDICINE | Facility: CLINIC | Age: 37
End: 2022-07-28
Payer: COMMERCIAL

## 2022-07-28 VITALS
OXYGEN SATURATION: 98 % | DIASTOLIC BLOOD PRESSURE: 87 MMHG | RESPIRATION RATE: 15 BRPM | WEIGHT: 292 LBS | SYSTOLIC BLOOD PRESSURE: 136 MMHG | HEIGHT: 71 IN | TEMPERATURE: 99.1 F | BODY MASS INDEX: 40.88 KG/M2 | HEART RATE: 92 BPM

## 2022-07-28 DIAGNOSIS — M25.511 ACUTE PAIN OF RIGHT SHOULDER: Primary | ICD-10-CM

## 2022-07-28 PROCEDURE — 99213 OFFICE O/P EST LOW 20 MIN: CPT | Performed by: FAMILY MEDICINE

## 2022-07-28 ASSESSMENT — PAIN SCALES - GENERAL: PAINLEVEL: NO PAIN (0)

## 2022-07-28 NOTE — PROGRESS NOTES
"  Colton Block is a 36 year old presenting for the following health issues:  No chief complaint on file.    DOI 5/27/2022 while transferring patient with [facility].    History of Present Illness       Reason for visit:  Follow up on shoulder  Symptom onset:  More than a month  Symptoms include:  Pain in shoulder  Symptom intensity:  Mild  Symptom progression:  Improving  Had these symptoms before:  No  What makes it worse:  Not at this time  What makes it better:  Resting    She eats 0-1 servings of fruits and vegetables daily.She consumes 4 sweetened beverage(s) daily.She exercises with enough effort to increase her heart rate 9 or less minutes per day.  She exercises with enough effort to increase her heart rate 3 or less days per week. She is missing 2 dose(s) of medications per week.       Review of Systems   Constitutional, HEENT, cardiovascular, pulmonary, GI, , musculoskeletal, neuro, skin, endocrine and psych systems are negative, except as otherwise noted.      Objective    /87 (BP Location: Left arm, Patient Position: Sitting, Cuff Size: Adult Large)   Pulse 92   Temp 99.1  F (37.3  C) (Oral)   Resp 15   Ht 1.81 m (5' 11.26\")   Wt 132.5 kg (292 lb)   SpO2 98%   BMI 40.43 kg/m    Body mass index is 40.43 kg/m .  Physical Exam   GENERAL: healthy, alert and no distress  NECK: no adenopathy, no asymmetry, masses, or scars and thyroid normal to palpation  RESP: lungs clear to auscultation - no rales, rhonchi or wheezes  CV: regular rate and rhythm, normal S1 S2, no S3 or S4, no murmur, click or rub, no peripheral edema and peripheral pulses strong  ABDOMEN: soft, nontender, no hepatosplenomegaly, no masses and bowel sounds normal  MS: no gross musculoskeletal defects noted, no edema    A/P:    (M25.511) Acute pain of right shoulder  (primary encounter diagnosis)  Comment:   Plan: patient stated pain has resolved and wants to go back to work. Restrictions lifted. If pain occurs again, patient " will RTC as needed.    James Ploanco MD              .  ..

## 2022-07-28 NOTE — LETTER
[address] [address]  Phone: [phone]    July 28, 2022        Mckayla Jeff  [facility] [address] [address]          To whom it may concern:    RE: Mckayla Jeff    Patient was seen and treated today at our clinic.  Patient may return to work 8/1/2022 with the following:  No working or lifting restrictions    Please contact me for questions or concerns.      Sincerely,        James Polanco MD

## 2022-07-28 NOTE — PATIENT INSTRUCTIONS
At [facility], we strive to deliver an exceptional experience to you, every time we see you. If you receive a survey, please complete it as we do value your feedback.  If you have MyChart, you can expect to receive results automatically within 24 hours of their completion.  Your provider will send a note interpreting your results as well.   If you do not have MyChart, you should receive your results in about a week by mail.    Your care team:                            Family Medicine Internal Medicine   MD Triston Vegas MD Shantel Branch-Fleming, MD Srinivasa Vaka, MD Katya Belousova, MELANIA Sanchez CNP, MD ([facility]   Gregor Brown, MD Rebecca Mendez MD Amelia Massimini APRN CNP Kim Thein, APRN CNP Bethany Templen, MD             Clinic hours: Monday - Thursday 7 am-6 pm; Fridays 7 am-5 pm.   Urgent care: Monday - Friday 10 am- 8 pm; Saturday and Sunday 9 am-5 pm.    Clinic: [phone]       [facility]: Monday - Thursday 8 am - 7 pm; Friday 8 am - 6 pm  [facility]: [phone]

## 2022-08-02 ENCOUNTER — THERAPY VISIT (OUTPATIENT)
Dept: PHYSICAL THERAPY | Facility: CLINIC | Age: 37
End: 2022-08-02
Payer: COMMERCIAL

## 2022-08-02 DIAGNOSIS — M25.511 ACUTE PAIN OF RIGHT SHOULDER: Primary | ICD-10-CM

## 2022-08-02 PROCEDURE — 97110 THERAPEUTIC EXERCISES: CPT | Mod: GP | Performed by: PHYSICAL THERAPIST

## 2022-08-02 NOTE — PROGRESS NOTES
Subjective:  HPI  Physical Exam                    Objective:  System    Physical Exam    General     ROS    Assessment/Plan:    PROGRESS  REPORT    Progress reporting period is from 6/7/2022 to 8/2/2022.       SUBJECTIVE  The shoulder is feeling good.  I don't have any pain right now.  I can get slight aching across the tops of the shoulder but very minimal.    Current pain level is 1/10  .     Previous pain level was 4/10.   Changes in function:  Yes (See Goal flowsheet attached for changes in current functional level)  Adverse reaction to treatment or activity: None    OBJECTIVE  Changes noted in objective findings:  The objective findings below are from DOS 8-2-2022.    AROM: R flexion 140, abd 148, IR/ext T12: + pain with extension position  MMT: flexion/abd 5-/5 B; ER 5-/5; biceps/triceps 5/5 B.      Reviewed taping and finalized HEP.         ASSESSMENT/PLAN  Updated problem list and treatment plan: Diagnosis 1:  R trapezius strain    Pain -  home program  Decreased strength - therapeutic exercise and therapeutic activities  STG/LTGs have been met or progress has been made towards goals:  Yes (See Goal flow sheet completed today.)  Assessment of Progress: The patient's condition is improving.  Self Management Plans:  Patient is independent in a home treatment program.  Patient is independent in self management of symptoms.  I have re-evaluated this patient and find that the nature, scope, duration and intensity of the therapy is appropriate for the medical condition of the patient.  Mckayla continues to require the following intervention to meet STG and LTG's:  PT intervention is no longer required to meet STG/LTG.    Recommendations:  This patient is ready to be discharged from therapy and continue their home treatment program.    Please refer to the daily flowsheet for treatment today, total treatment time and time spent performing 1:1 timed codes.

## 2022-08-04 ENCOUNTER — VIRTUAL VISIT (OUTPATIENT)
Dept: FAMILY MEDICINE | Facility: CLINIC | Age: 37
End: 2022-08-04
Payer: COMMERCIAL

## 2022-08-04 DIAGNOSIS — F33.1 MODERATE RECURRENT MAJOR DEPRESSION (H): Primary | ICD-10-CM

## 2022-08-04 DIAGNOSIS — F43.23 ADJUSTMENT DISORDER WITH MIXED ANXIETY AND DEPRESSED MOOD: ICD-10-CM

## 2022-08-04 PROCEDURE — 99213 OFFICE O/P EST LOW 20 MIN: CPT | Mod: 95 | Performed by: FAMILY MEDICINE

## 2022-08-04 NOTE — PROGRESS NOTES
Mckayla is a 36 year old who is being evaluated via a billable telephone visit:        Subjective   Mckayla is a 36 year old presenting for the following health issues:  No chief complaint on file.      HPI     Mckayla Jeff is a 36 year old female h/o depression and anxiety wanting a referral to be seen by Psychiatry for evaluation. No other concerns today. Patient is safe. No SI's.    Review of Systems   Constitutional, HEENT, cardiovascular, pulmonary, GI, , musculoskeletal, neuro, skin, endocrine and psych systems are negative, except as otherwise noted.      Objective           Vitals:  No vitals were obtained today due to virtual visit.    Physical Exam   No exam due to telephone visit.    A/P:  (F33.1) Moderate recurrent major depression (H)  (primary encounter diagnosis)  Comment:   Plan: Adult Mental Health  Referral        Patient referred to Psychiatry for evaluation and recommendations.    (F43.23) Adjustment disorder with mixed anxiety and depressed mood  Comment:   Plan: Adult Mental Health  Referral            James Polanco MD        Total telephone visit time: 5 minutes    .  ..

## 2022-08-30 ENCOUNTER — OFFICE VISIT (OUTPATIENT)
Dept: URGENT CARE | Facility: URGENT CARE | Age: 37
End: 2022-08-30
Payer: COMMERCIAL

## 2022-08-30 VITALS
HEART RATE: 90 BPM | TEMPERATURE: 98.6 F | SYSTOLIC BLOOD PRESSURE: 145 MMHG | WEIGHT: 293 LBS | OXYGEN SATURATION: 97 % | BODY MASS INDEX: 41.23 KG/M2 | DIASTOLIC BLOOD PRESSURE: 89 MMHG

## 2022-08-30 DIAGNOSIS — J98.8 VIRAL RESPIRATORY ILLNESS: Primary | ICD-10-CM

## 2022-08-30 DIAGNOSIS — B97.89 VIRAL RESPIRATORY ILLNESS: Primary | ICD-10-CM

## 2022-08-30 DIAGNOSIS — R09.81 NASAL CONGESTION: ICD-10-CM

## 2022-08-30 DIAGNOSIS — H92.03 OTALGIA OF BOTH EARS: ICD-10-CM

## 2022-08-30 PROCEDURE — 99213 OFFICE O/P EST LOW 20 MIN: CPT | Performed by: PHYSICIAN ASSISTANT

## 2022-08-30 RX ORDER — FLUTICASONE PROPIONATE 50 MCG
1 SPRAY, SUSPENSION (ML) NASAL DAILY
Qty: 16 G | Refills: 0 | Status: SHIPPED | OUTPATIENT
Start: 2022-08-30 | End: 2022-09-09

## 2022-08-30 NOTE — PROGRESS NOTES
Chief Complaint   Patient presents with     Otalgia               ASSESSMENT:     ICD-10-CM    1. Viral respiratory illness  J98.8     B97.89    2. Nasal congestion  R09.81 fluticasone (FLONASE) 50 MCG/ACT nasal spray   3. Otalgia of both ears  H92.03 fluticasone (FLONASE) 50 MCG/ACT nasal spray             PLAN: Upper respiratory infection.  Flonase nasal steroid spray for congestion.  Declines COVID testing here, had 2 negative tests at home.  I have discussed clinical findings with patient.  Side effects of medications discussed.  Symptomatic care is discussed.  I have discussed the possibility of  worsening symptoms and indication to RTC or go to the ER if they occur.  All questions are answered, patient indicates understanding of these issues and is in agreement with plan.   Patient care instructions are discussed/given at the end of visit.   Lots of rest and fluids.  Mildly elevated blood pressure, recheck outside of clinic once feeling better and follow-up with primary if any concerns    Urmila Maynard PA-C      SUBJECTIVE:    36-year-old female presents for bilateral ear pain for the last 3 days.  No cough.  No postnasal drip.  Significant nasal congestion.  Mild headache.  Sore throat which resolved today.  2 negative home COVID test.  COVID vaccination x3.  No sneezing or watery itchy eyes.  No history of seasonal allergies or asthma.  No fever.    No Known Allergies    Past Medical History:   Diagnosis Date     Contusion of knee 12/9/2014     Hypertension      Obesity      PCOS (polycystic ovarian syndrome)        amLODIPine (NORVASC) 5 MG tablet, Take 1 tablet (5 mg) by mouth daily  hydrochlorothiazide (HYDRODIURIL) 25 MG tablet, Take 1 tablet (25 mg) by mouth daily  losartan (COZAAR) 100 MG tablet, Take 1 tablet (100 mg) by mouth daily  metFORMIN (GLUCOPHAGE) 500 MG tablet, Take 1 tablet (500 mg) by mouth 2 times daily (with meals) (Patient not taking: No sig reported)  naproxen (NAPROSYN) 500 MG  tablet, Take 1 tablet (500 mg) by mouth 2 times daily (with meals)  ondansetron (ZOFRAN ODT) 4 MG ODT tab, Take 1-2 tablets (4-8 mg) by mouth every 8 hours as needed for nausea  potassium chloride ER (KLOR-CON M) 10 MEQ CR tablet, Take 2 tablets (20 mEq) by mouth daily    No current facility-administered medications on file prior to visit.      Social History     Tobacco Use     Smoking status: Never Smoker     Smokeless tobacco: Never Used   Substance Use Topics     Alcohol use: No       ROS:  CONSTITUTIONAL: Negative for fatigue or fever.  EYES: Negative for eye problems.  ENT: As above.  RESP: As above.  CV: Negative for chest pains.  GI: Negative for vomiting.  MUSCULOSKELETAL:  Negative for significant muscle or joint pains.  NEUROLOGIC: Negative for headaches.  SKIN: Negative for rash.  PSYCH: Normal mentation for age.    OBJECTIVE:  BP (!) 145/89 (BP Location: Left arm, Patient Position: Sitting, Cuff Size: Adult Large)   Pulse 90   Temp 98.6  F (37  C) (Tympanic)   Wt 135.1 kg (297 lb 12.8 oz)   SpO2 97%   BMI 41.23 kg/m      GENERAL APPEARANCE: Healthy, alert and no distress.  EYES:Conjunctiva/sclera clear.  EARS: No cerumen.   Ear canals w/o erythema.  TM's intact w/o erythema.    NOSE/MOUTH: Nose with clear nasal congestion/discharge   SINUSES: No maxillary sinus tenderness.  THROAT: No erythema w/o tonsillar enlargement . No exudates.  NECK: Supple, nontender, no lymphadenopathy.  RESP: Lungs clear to auscultation - no rales, rhonchi or wheezes  CV: Regular rate and rhythm, normal S1 S2, no murmur noted.  NEURO: Awake, alert    SKIN: No rashes        Urmila Maynard PA-C

## 2022-11-21 ENCOUNTER — HEALTH MAINTENANCE LETTER (OUTPATIENT)
Age: 37
End: 2022-11-21

## 2022-12-13 ENCOUNTER — TELEPHONE (OUTPATIENT)
Dept: FAMILY MEDICINE | Facility: CLINIC | Age: 37
End: 2022-12-13
Payer: COMMERCIAL

## 2022-12-13 PROCEDURE — 99207 PR NO CHARGE NURSE ONLY: CPT | Performed by: NURSE PRACTITIONER

## 2022-12-13 NOTE — TELEPHONE ENCOUNTER
Patient Quality Outreach    Patient is due for the following:   Hypertension -  BP check    Next Steps:   Schedule a nurse only visit for blood pressure check    Type of outreach:    Sent letter.  Left message; did not ask for callback      Questions for provider review:    None     Diane L. Schoenherr, RN

## 2022-12-13 NOTE — LETTER
"[address] [address]  [phone]  Dept: [phone]    December 13, 2022    Mckayla Jeff  [address]  [place] [address]    Dear Mckayla Jeff,     At [facility] we care about your health and are committed to providing quality patient care.    Which includes staying current on preventive cancer screenings.  You can increase your chances of finding and treating cancers through regular screenings.      Our records indicate you may be due for the following preventive screening(s):  blood pressure check    To schedule an appointment or discuss this screening further, you may contact us by phone at the [facility] at [phone] or online through the patient portal/Hired @ [email or web address]    If you have had any of the screenings listed above at another facility, please call us so that we may update your chart.      Your partners in health,      Quality Committee at [facility]    Please make a \"nurse only visit to check blood pressure\" at no cost to you.  Thank you                      "

## 2022-12-15 ENCOUNTER — TELEPHONE (OUTPATIENT)
Dept: FAMILY MEDICINE | Facility: CLINIC | Age: 37
End: 2022-12-15

## 2022-12-15 NOTE — TELEPHONE ENCOUNTER
What type of form? medical  What day did you drop off your forms? 12/15/2022  Is there a due date? ASAP (7-10 business day to compete forms)   How would you like to receive these forms? Patient will  at the clinic when completed  Which clinic [facility] the [facility] off at? [facility]    What is the best number to contact you? Cell [phone]  What time works best to contact you with in 4 hrs? ANY  Is it okay to leave a message? Yes     FORM ON DUMMY    Cheli Bryant

## 2022-12-15 NOTE — TELEPHONE ENCOUNTER
I reviewed my visit with patient on 8/2022. I made a referral to Psychiatry for further evaluation. I am not sure how she is doing since then. I agree that patient needs to follow up for this form to be completed. If patient saw Psychiatry, she can have them complete this as well. Form placed in  bin.    James Polanco MD

## 2022-12-15 NOTE — TELEPHONE ENCOUNTER
Thein states needs appointment to complete last provider seen was James Polnaco routing to him to see if form can be completed

## 2022-12-15 NOTE — TELEPHONE ENCOUNTER
Called and LVM to see if patient wanted to  form and bring to [facility] doctor or make and appointment to get form filled out     doctor Collin did approve a same day slot     Placed in waiting to hear back bin

## 2022-12-16 NOTE — TELEPHONE ENCOUNTER
Called pt and relayed message from provider. Pt will pick forms up to give to her psychiatrist to fill out.

## 2022-12-25 ENCOUNTER — HEALTH MAINTENANCE LETTER (OUTPATIENT)
Age: 37
End: 2022-12-25

## 2023-02-13 ENCOUNTER — OFFICE VISIT (OUTPATIENT)
Dept: FAMILY MEDICINE | Facility: CLINIC | Age: 38
End: 2023-02-13
Payer: MEDICAID

## 2023-02-13 VITALS
WEIGHT: 293 LBS | DIASTOLIC BLOOD PRESSURE: 116 MMHG | RESPIRATION RATE: 20 BRPM | BODY MASS INDEX: 41.02 KG/M2 | OXYGEN SATURATION: 99 % | TEMPERATURE: 97.7 F | SYSTOLIC BLOOD PRESSURE: 160 MMHG | HEART RATE: 86 BPM | HEIGHT: 71 IN

## 2023-02-13 DIAGNOSIS — F33.1 MODERATE RECURRENT MAJOR DEPRESSION (H): ICD-10-CM

## 2023-02-13 DIAGNOSIS — I10 ESSENTIAL HYPERTENSION WITH GOAL BLOOD PRESSURE LESS THAN 140/90: ICD-10-CM

## 2023-02-13 DIAGNOSIS — Z13.220 LIPID SCREENING: ICD-10-CM

## 2023-02-13 DIAGNOSIS — E87.6 HYPOKALEMIA: ICD-10-CM

## 2023-02-13 DIAGNOSIS — E28.2 PCOS (POLYCYSTIC OVARIAN SYNDROME): ICD-10-CM

## 2023-02-13 DIAGNOSIS — E11.9 TYPE 2 DIABETES MELLITUS WITHOUT COMPLICATION, WITHOUT LONG-TERM CURRENT USE OF INSULIN (H): Primary | ICD-10-CM

## 2023-02-13 LAB
ANION GAP SERPL CALCULATED.3IONS-SCNC: 6 MMOL/L (ref 3–14)
BUN SERPL-MCNC: 9 MG/DL (ref 7–30)
CALCIUM SERPL-MCNC: 9.2 MG/DL (ref 8.5–10.1)
CHLORIDE BLD-SCNC: 108 MMOL/L (ref 94–109)
CHOLEST SERPL-MCNC: 160 MG/DL
CO2 SERPL-SCNC: 27 MMOL/L (ref 20–32)
CREAT SERPL-MCNC: 0.91 MG/DL (ref 0.52–1.04)
FASTING STATUS PATIENT QL REPORTED: NO
GFR SERPL CREATININE-BSD FRML MDRD: 82 ML/MIN/1.73M2
GLUCOSE BLD-MCNC: 120 MG/DL (ref 70–99)
HBA1C MFR BLD: 6.8 % (ref 0–5.6)
HDLC SERPL-MCNC: 44 MG/DL
LDLC SERPL CALC-MCNC: 85 MG/DL
NONHDLC SERPL-MCNC: 116 MG/DL
POTASSIUM BLD-SCNC: 3.5 MMOL/L (ref 3.4–5.3)
SODIUM SERPL-SCNC: 141 MMOL/L (ref 133–144)
TRIGL SERPL-MCNC: 153 MG/DL

## 2023-02-13 PROCEDURE — 83036 HEMOGLOBIN GLYCOSYLATED A1C: CPT | Performed by: PHYSICIAN ASSISTANT

## 2023-02-13 PROCEDURE — 99214 OFFICE O/P EST MOD 30 MIN: CPT | Performed by: PHYSICIAN ASSISTANT

## 2023-02-13 PROCEDURE — 80061 LIPID PANEL: CPT | Performed by: PHYSICIAN ASSISTANT

## 2023-02-13 PROCEDURE — 36415 COLL VENOUS BLD VENIPUNCTURE: CPT | Performed by: PHYSICIAN ASSISTANT

## 2023-02-13 PROCEDURE — 80048 BASIC METABOLIC PNL TOTAL CA: CPT | Performed by: PHYSICIAN ASSISTANT

## 2023-02-13 RX ORDER — POTASSIUM CHLORIDE 750 MG/1
20 TABLET, EXTENDED RELEASE ORAL DAILY
Qty: 180 TABLET | Refills: 1 | Status: SHIPPED | OUTPATIENT
Start: 2023-02-13 | End: 2023-12-11

## 2023-02-13 RX ORDER — LOSARTAN POTASSIUM 100 MG/1
100 TABLET ORAL DAILY
Qty: 90 TABLET | Refills: 1 | Status: SHIPPED | OUTPATIENT
Start: 2023-02-13 | End: 2023-12-04

## 2023-02-13 RX ORDER — HYDROCHLOROTHIAZIDE 25 MG/1
25 TABLET ORAL DAILY
Qty: 90 TABLET | Refills: 1 | Status: SHIPPED | OUTPATIENT
Start: 2023-02-13 | End: 2023-12-04

## 2023-02-13 RX ORDER — AMLODIPINE BESYLATE 5 MG/1
5 TABLET ORAL DAILY
Qty: 90 TABLET | Refills: 1 | Status: SHIPPED | OUTPATIENT
Start: 2023-02-13 | End: 2023-12-04

## 2023-02-13 ASSESSMENT — PAIN SCALES - GENERAL: PAINLEVEL: MODERATE PAIN (5)

## 2023-02-13 ASSESSMENT — PATIENT HEALTH QUESTIONNAIRE - PHQ9
SUM OF ALL RESPONSES TO PHQ QUESTIONS 1-9: 20
10. IF YOU CHECKED OFF ANY PROBLEMS, HOW DIFFICULT HAVE THESE PROBLEMS MADE IT FOR YOU TO DO YOUR WORK, TAKE CARE OF THINGS AT HOME, OR GET ALONG WITH OTHER PEOPLE: EXTREMELY DIFFICULT
SUM OF ALL RESPONSES TO PHQ QUESTIONS 1-9: 20

## 2023-02-13 NOTE — PROGRESS NOTES
"  Assessment & Plan   Problem List Items Addressed This Visit        Endocrine    PCOS (polycystic ovarian syndrome)       Circulatory    Essential hypertension with goal blood pressure less than 140/90    Relevant Medications    losartan (COZAAR) 100 MG tablet    hydrochlorothiazide (HYDRODIURIL) 25 MG tablet    amLODIPine (NORVASC) 5 MG tablet    Other Relevant Orders    Basic metabolic panel  (Ca, Cl, CO2, Creat, Gluc, K, Na, BUN)       Behavioral    Moderate recurrent major depression (H)    Relevant Orders    Adult Mental Health  Referral   Other Visit Diagnoses     Elevated glucose    -  Primary    Relevant Orders    Hemoglobin A1c    Hypokalemia        Relevant Medications    potassium chloride ER (KLOR-CON M) 10 MEQ CR tablet    Lipid screening        Relevant Orders    Lipid Profile (Chol, Trig, HDL, LDL calc)         Restart all BP medications   restart potassium   Patient wants to hold off on taking metformin until lab is back     15 minutes spent on the date of the encounter doing chart review, history and exam, documentation and further activities per the note       BMI:   Estimated body mass index is 42.99 kg/m  as calculated from the following:    Height as of this encounter: 1.803 m (5' 11\").    Weight as of this encounter: 139.8 kg (308 lb 3.2 oz).   Weight management plan: Discussed healthy diet and exercise guidelines    Depression Screening Follow Up    PHQ 2/13/2023   PHQ-9 Total Score 20   Q9: Thoughts of better off dead/self-harm past 2 weeks Several days   F/U: Thoughts of suicide or self-harm Yes   F/U: Self harm-plan No   F/U: Self-harm action No   F/U: Safety concerns No     Declined SI plan or self harm, feels safe. Declined medication treatment at this time.       Return in about 1 month (around 3/13/2023).    [facility]   Mckayla is a 37 year old, presenting for the following health issues:  Hypertension and " "Headache      History of Present Illness       Hypertension: She presents for follow up of hypertension.  She does not check blood pressure  regularly outside of the clinic. Outside blood pressures have been over 140/90. She does not follow a low salt diet.     Headaches:   Since the patient's last clinic visit, headaches are: no change  The patient is getting headaches:  Everyday  She is not able to do normal daily activities when she has a migraine.  The patient is taking the following rescue/relief medications:  Ibuprofen (Advil, Motrin) and Tylenol   Patient states \"I get no relief\" from the rescue/relief medications.   The patient is taking the following medications to prevent migraines:  No medications to prevent migraines  In the past 4 weeks, the patient has gone to an Urgent Care or Emergency Room 0 times times due to headaches.     Today's PHQ-9         PHQ-9 Total Score: 20    PHQ-9 Q9 Thoughts of better off dead/self-harm past 2 weeks :   Several days  Thoughts of suicide or self harm: (P) Yes  Self-harm Plan:   (P) No  Self-harm Action:     (P) No  Safety concerns for self or others: (P) No    How difficult have these problems made it for you to do your work, take care of things at home, or get along with other people: Extremely difficult       Hypertension Follow-up      Do you check your blood pressure regularly outside of the clinic? Yes     Are you following a low salt diet? No    Are your blood pressures ever more than 140 on the top number (systolic) OR more   than 90 on the bottom number (diastolic), for example 140/90? Yes      How many servings of fruits and vegetables do you eat daily?  0-1    On average, how many sweetened beverages do you drink each day (Examples: soda, juice, sweet tea, etc.  Do NOT count diet or artificially sweetened beverages)?       How many days per week do you exercise enough to make your heart beat faster? 3 or less    How many minutes a day do you exercise enough to " "make your heart beat faster? 9 or less  How many days per week do you miss taking your medication? 7    What makes it hard for you to take your medications?  remembering to take and ran out      Review of Systems   Constitutional, HEENT, cardiovascular, pulmonary, gi and gu systems are negative, except as otherwise noted.      Objective    BP (!) 160/116 (BP Location: Left arm, Patient Position: Sitting, Cuff Size: Adult Large)   Pulse 86   Temp 97.7  F (36.5  C) (Tympanic)   Resp 20   Ht 1.803 m (5' 11\")   Wt 139.8 kg (308 lb 3.2 oz)   LMP 01/31/2023 (Exact Date)   SpO2 99%   BMI 42.99 kg/m    Body mass index is 42.99 kg/m .  Physical Exam   GENERAL: alert, no distress and obese  NECK: no adenopathy, no asymmetry, masses, or scars and thyroid normal to palpation  RESP: lungs clear to auscultation - no rales, rhonchi or wheezes  CV: regular rate and rhythm, normal S1 S2, no S3 or S4, no murmur, click or rub, no peripheral edema and peripheral pulses strong  ABDOMEN: soft, nontender, no hepatosplenomegaly, no masses and bowel sounds normal  MS: no gross musculoskeletal defects noted, no edema  PSYCH: mentation appears normal, affect normal/bright                    "

## 2023-02-14 PROBLEM — E11.9 DIABETES MELLITUS, TYPE 2 (H): Status: ACTIVE | Noted: 2023-02-14

## 2023-02-14 PROBLEM — E87.6 HYPOKALEMIA: Status: ACTIVE | Noted: 2023-02-14

## 2023-02-14 RX ORDER — METFORMIN HCL 500 MG
1000 TABLET, EXTENDED RELEASE 24 HR ORAL
Qty: 180 TABLET | Refills: 1 | Status: SHIPPED | OUTPATIENT
Start: 2023-02-14

## 2023-03-13 ENCOUNTER — VIRTUAL VISIT (OUTPATIENT)
Dept: PSYCHOLOGY | Facility: CLINIC | Age: 38
End: 2023-03-13

## 2023-03-13 DIAGNOSIS — F33.1 MODERATE RECURRENT MAJOR DEPRESSION (H): ICD-10-CM

## 2023-03-13 DIAGNOSIS — Z53.9 ERRONEOUS ENCOUNTER--DISREGARD: Primary | ICD-10-CM

## 2023-03-21 ENCOUNTER — NURSE TRIAGE (OUTPATIENT)
Dept: NURSING | Facility: CLINIC | Age: 38
End: 2023-03-21

## 2023-03-21 ENCOUNTER — LAB (OUTPATIENT)
Dept: LAB | Facility: CLINIC | Age: 38
End: 2023-03-21
Payer: COMMERCIAL

## 2023-03-21 ENCOUNTER — OFFICE VISIT (OUTPATIENT)
Dept: FAMILY MEDICINE | Facility: CLINIC | Age: 38
End: 2023-03-21
Payer: COMMERCIAL

## 2023-03-21 VITALS
OXYGEN SATURATION: 99 % | DIASTOLIC BLOOD PRESSURE: 96 MMHG | TEMPERATURE: 97.8 F | SYSTOLIC BLOOD PRESSURE: 132 MMHG | HEART RATE: 97 BPM

## 2023-03-21 DIAGNOSIS — N89.8 VAGINAL DISCHARGE: ICD-10-CM

## 2023-03-21 DIAGNOSIS — B96.89 BACTERIAL VAGINITIS: Primary | ICD-10-CM

## 2023-03-21 DIAGNOSIS — N76.0 BACTERIAL VAGINITIS: Primary | ICD-10-CM

## 2023-03-21 PROCEDURE — 87210 SMEAR WET MOUNT SALINE/INK: CPT

## 2023-03-21 PROCEDURE — 99213 OFFICE O/P EST LOW 20 MIN: CPT

## 2023-03-21 RX ORDER — METRONIDAZOLE 500 MG/1
500 TABLET ORAL 2 TIMES DAILY
Qty: 14 TABLET | Refills: 0 | Status: SHIPPED | OUTPATIENT
Start: 2023-03-21 | End: 2023-03-28

## 2023-03-21 ASSESSMENT — ENCOUNTER SYMPTOMS
FEVER: 0
DIARRHEA: 0
ANOREXIA: 0
FREQUENCY: 0
ABDOMINAL PAIN: 0
CONSTIPATION: 0

## 2023-03-21 NOTE — PROGRESS NOTES
Assessment & Plan       ICD-10-CM    1. Bacterial vaginitis  N76.0 metroNIDAZOLE (FLAGYL) 500 MG tablet    B96.89       2. Vaginal discharge  N89.8 Wet preparation - Clinic Collect         Wet prep positive for clue cells. Mckayla is to take antibiotic as directed.       Return if symptoms worsen or fail to improve, for Follow up.    At the end of the encounter, I discussed results, diagnosis, medications. Discussed red flags for immediate return to clinic/ER, as well as indications for follow up if no improvement. Patient understood and agreed to plan. Patient was stable for discharge.    Subjective     Mckayla is a 37 year old female who presents to clinic today for the following health issues:  Chief Complaint   Patient presents with     Urgent Care     Vaginal Discharge     Per pt- has been ongoing for awhile. Have not tried otc medication but getting worst with odor smell and discharge     Mckayla presents with reports of vaginal odor x 4 weeks. LMP 3/1/23  Vaginal Problem   This is a new problem. The current episode started more than 1 week ago. The problem occurs constantly. The problem has not changed since onset.The discharge was white and malodorous. She is not pregnant. She has not missed her period. Pertinent negatives include no anorexia, no fever, no abdominal pain, no constipation, no diarrhea, no dyspareunia, no frequency, no genital burning, no genital itching and no genital lesions.           Review of Systems   Constitutional: Negative for fever.   Gastrointestinal: Negative for abdominal pain, anorexia, constipation and diarrhea.   Genitourinary: Positive for vaginal discharge. Negative for dyspareunia and frequency.       Problem List:  2023-02: Diabetes mellitus, type 2 (H)  2023-02: Hypokalemia  2019-11: Strain of right hamstring  2019-10: Moderate recurrent major depression (H)  2019-10: Other chest pain  2019-05: Right knee pain  2019-01: Sprain of anterior talofibular ligament of left  ankle  2015-12: Health Care Home  2015-11: Adjustment disorder with mixed anxiety and depressed mood  2015-02: Internal derangement of knee  2014-12: Synovitis of knee  2014-05: PCOS (polycystic ovarian syndrome)  2014-04: Essential hypertension with goal blood pressure less than   140/90  2014-04: CARDIOVASCULAR SCREENING; LDL GOAL LESS THAN 160  Obesity, Class III, BMI 40-49.9 (morbid obesity) (H)      Past Medical History:   Diagnosis Date     Contusion of knee 12/9/2014     Hypertension      Obesity      PCOS (polycystic ovarian syndrome)        Social History     Tobacco Use     Smoking status: Never     Smokeless tobacco: Never   Substance Use Topics     Alcohol use: No           Objective    BP (!) 132/96   Pulse 97   Temp 97.8  F (36.6  C) (Tympanic)   LMP 03/01/2023 (Exact Date)   SpO2 99%   Physical Exam  Constitutional:       Appearance: Normal appearance.   HENT:      Head: Normocephalic and atraumatic.   Musculoskeletal:      Cervical back: Normal range of motion and neck supple.   Skin:     General: Skin is warm and dry.   Neurological:      General: No focal deficit present.      Mental Status: She is alert and oriented to person, place, and time.   Psychiatric:         Mood and Affect: Mood normal.         Behavior: Behavior normal.         Thought Content: Thought content normal.         Judgment: Judgment normal.              Torsten Hollingsworth PA-C

## 2023-03-21 NOTE — TELEPHONE ENCOUNTER
Nurse Triage SBAR    Situation:   -Missed call    Background:   -Patient calling, .It is okay to leave a detailed message at this number.     Assessment:   -We reviewed the following notes:      Recommendation:   -call back with any other questions or concerns    HAKEEM MOLINA RN on 3/21/2023 at 3:33 PM     Reason for Disposition    Information only question and nurse able to answer    Protocols used: INFORMATION ONLY CALL - NO TRIAGE-A-OH

## 2023-04-03 ENCOUNTER — OFFICE VISIT (OUTPATIENT)
Dept: URGENT CARE | Facility: URGENT CARE | Age: 38
End: 2023-04-03
Payer: COMMERCIAL

## 2023-04-03 VITALS
OXYGEN SATURATION: 100 % | TEMPERATURE: 97.6 F | RESPIRATION RATE: 24 BRPM | SYSTOLIC BLOOD PRESSURE: 144 MMHG | DIASTOLIC BLOOD PRESSURE: 90 MMHG | BODY MASS INDEX: 42.54 KG/M2 | HEART RATE: 90 BPM | WEIGHT: 293 LBS

## 2023-04-03 DIAGNOSIS — B96.89 BACTERIAL VAGINITIS: ICD-10-CM

## 2023-04-03 DIAGNOSIS — N89.8 VAGINAL ODOR: Primary | ICD-10-CM

## 2023-04-03 DIAGNOSIS — N76.0 BACTERIAL VAGINITIS: ICD-10-CM

## 2023-04-03 LAB
CLUE CELLS: NORMAL
TRICHOMONAS, WET PREP: NORMAL
WBC'S/HIGH POWER FIELD, WET PREP: NORMAL
YEAST, WET PREP: NORMAL

## 2023-04-03 PROCEDURE — 99213 OFFICE O/P EST LOW 20 MIN: CPT | Performed by: NURSE PRACTITIONER

## 2023-04-03 PROCEDURE — 87210 SMEAR WET MOUNT SALINE/INK: CPT | Performed by: NURSE PRACTITIONER

## 2023-04-03 RX ORDER — METRONIDAZOLE 7.5 MG/G
1 GEL VAGINAL DAILY
Qty: 25 G | Refills: 0 | Status: SHIPPED | OUTPATIENT
Start: 2023-04-03 | End: 2023-04-08

## 2023-04-03 NOTE — PROGRESS NOTES
Chief Complaint   Patient presents with     Vaginal Problem     Vaginal odor was seen on 3/21/2023 dx'd with bv         ICD-10-CM    1. Vaginal odor  N89.8 Wet prep - Clinic Collect     UA macro with reflex to Microscopic and Culture - Clinc Collect      2. Bacterial vaginitis  N76.0 metroNIDAZOLE (METROGEL) 0.75 % vaginal gel    B96.89       Patient prefers to try MetroGel for treatment this time.  Recheck in 7 days if any symptoms remain.      Results for orders placed or performed in visit on 04/03/23 (from the past 24 hour(s))   Wet prep - Clinic Collect    Specimen: Vagina; Swab   Result Value Ref Range    Trichomonas Absent Absent    Yeast Absent Absent    Clue Cells Absent Absent    WBCs/high power field None None       Subjective     Mckayla Jeff is an 37 year old female who presents to clinic today for vaginal odor.  She was seen on March 21 and diagnosed with bacterial vaginosis.  At that time she was treated with metronidazole orally.      ROS: 10 point ROS neg other than the symptoms noted above in the HPI.       Objective    BP (!) 144/90   Pulse 90   Temp 97.6  F (36.4  C) (Tympanic)   Resp 24   Wt 138.3 kg (305 lb)   LMP 03/01/2023 (Exact Date)   SpO2 100%   BMI 42.54 kg/m    Nurses notes and VS have been reviewed.    Physical Exam       GENERAL APPEARANCE: healthy appearing, alert     ABDOMEN:  soft, nontender, no HSM or masses and bowel sounds normal     SKIN: no suspicious lesions or rashes     PSYCH: normal thought process; no significant mood disturbance      MELANIA Gomez, CNP  [facility] Provider    The use of Dragon/Bjond dictation services may have been used to construct the content in this note; any grammatical or spelling errors are non-intentional. Please contact the author of this note directly if you are in need of any clarification.    done done

## 2023-06-02 ENCOUNTER — HEALTH MAINTENANCE LETTER (OUTPATIENT)
Age: 38
End: 2023-06-02

## 2023-06-15 ENCOUNTER — HOSPITAL ENCOUNTER (EMERGENCY)
Facility: CLINIC | Age: 38
Discharge: HOME | End: 2023-06-15
Attending: EMERGENCY MEDICINE | Admitting: EMERGENCY MEDICINE
Payer: COMMERCIAL

## 2023-06-15 ENCOUNTER — APPOINTMENT (OUTPATIENT)
Dept: GENERAL RADIOLOGY | Facility: CLINIC | Age: 38
End: 2023-06-15
Attending: EMERGENCY MEDICINE
Payer: COMMERCIAL

## 2023-06-15 VITALS
TEMPERATURE: 99.9 F | DIASTOLIC BLOOD PRESSURE: 107 MMHG | BODY MASS INDEX: 41.02 KG/M2 | SYSTOLIC BLOOD PRESSURE: 168 MMHG | OXYGEN SATURATION: 99 % | WEIGHT: 293 LBS | HEIGHT: 71 IN | HEART RATE: 84 BPM | RESPIRATION RATE: 18 BRPM

## 2023-06-15 DIAGNOSIS — I10 HYPERTENSION, UNSPECIFIED TYPE: ICD-10-CM

## 2023-06-15 DIAGNOSIS — Z11.3 SCREENING EXAMINATION FOR STI: ICD-10-CM

## 2023-06-15 DIAGNOSIS — E11.9 TYPE 2 DIABETES MELLITUS WITHOUT COMPLICATION, WITHOUT LONG-TERM CURRENT USE OF INSULIN (H): ICD-10-CM

## 2023-06-15 DIAGNOSIS — R07.9 CHEST PAIN, UNSPECIFIED TYPE: ICD-10-CM

## 2023-06-15 LAB
ANION GAP SERPL CALCULATED.3IONS-SCNC: 11 MMOL/L (ref 7–15)
ATRIAL RATE - MUSE: 94 BPM
BUN SERPL-MCNC: 13.1 MG/DL (ref 6–20)
CALCIUM SERPL-MCNC: 9.4 MG/DL (ref 8.6–10)
CHLORIDE SERPL-SCNC: 103 MMOL/L (ref 98–107)
CREAT SERPL-MCNC: 0.75 MG/DL (ref 0.51–0.95)
DEPRECATED HCO3 PLAS-SCNC: 26 MMOL/L (ref 22–29)
DIASTOLIC BLOOD PRESSURE - MUSE: NORMAL MMHG
ERYTHROCYTE [DISTWIDTH] IN BLOOD BY AUTOMATED COUNT: 14.2 % (ref 10–15)
GFR SERPL CREATININE-BSD FRML MDRD: >90 ML/MIN/1.73M2
GLUCOSE SERPL-MCNC: 149 MG/DL (ref 70–99)
HCT VFR BLD AUTO: 38.8 % (ref 35–47)
HGB BLD-MCNC: 11.8 G/DL (ref 11.7–15.7)
HOLD SPECIMEN: NORMAL
INTERPRETATION ECG - MUSE: NORMAL
MCH RBC QN AUTO: 24.4 PG (ref 26.5–33)
MCHC RBC AUTO-ENTMCNC: 30.4 G/DL (ref 31.5–36.5)
MCV RBC AUTO: 80 FL (ref 78–100)
P AXIS - MUSE: 34 DEGREES
PLATELET # BLD AUTO: 414 10E3/UL (ref 150–450)
POTASSIUM SERPL-SCNC: 3.7 MMOL/L (ref 3.4–5.3)
PR INTERVAL - MUSE: 146 MS
QRS DURATION - MUSE: 82 MS
QT - MUSE: 372 MS
QTC - MUSE: 465 MS
R AXIS - MUSE: 9 DEGREES
RBC # BLD AUTO: 4.83 10E6/UL (ref 3.8–5.2)
SODIUM SERPL-SCNC: 140 MMOL/L (ref 136–145)
SYSTOLIC BLOOD PRESSURE - MUSE: NORMAL MMHG
T AXIS - MUSE: 33 DEGREES
TROPONIN T SERPL HS-MCNC: <6 NG/L
VENTRICULAR RATE- MUSE: 94 BPM
WBC # BLD AUTO: 11.8 10E3/UL (ref 4–11)

## 2023-06-15 PROCEDURE — 36415 COLL VENOUS BLD VENIPUNCTURE: CPT | Performed by: EMERGENCY MEDICINE

## 2023-06-15 PROCEDURE — 84484 ASSAY OF TROPONIN QUANT: CPT | Performed by: EMERGENCY MEDICINE

## 2023-06-15 PROCEDURE — 250N000013 HC RX MED GY IP 250 OP 250 PS 637: Performed by: EMERGENCY MEDICINE

## 2023-06-15 PROCEDURE — 85027 COMPLETE CBC AUTOMATED: CPT | Performed by: EMERGENCY MEDICINE

## 2023-06-15 PROCEDURE — 71046 X-RAY EXAM CHEST 2 VIEWS: CPT

## 2023-06-15 PROCEDURE — 99285 EMERGENCY DEPT VISIT HI MDM: CPT | Mod: 25

## 2023-06-15 PROCEDURE — 93005 ELECTROCARDIOGRAM TRACING: CPT

## 2023-06-15 PROCEDURE — 80048 BASIC METABOLIC PNL TOTAL CA: CPT | Performed by: EMERGENCY MEDICINE

## 2023-06-15 RX ORDER — AMLODIPINE BESYLATE 5 MG/1
5 TABLET ORAL DAILY
Status: DISCONTINUED | OUTPATIENT
Start: 2023-06-15 | End: 2023-06-15 | Stop reason: HOSPADM

## 2023-06-15 RX ORDER — ONDANSETRON 4 MG/1
4 TABLET, ORALLY DISINTEGRATING ORAL
Status: DISCONTINUED | OUTPATIENT
Start: 2023-06-15 | End: 2023-06-15 | Stop reason: HOSPADM

## 2023-06-15 RX ORDER — LOSARTAN POTASSIUM 100 MG/1
100 TABLET ORAL DAILY
Status: DISCONTINUED | OUTPATIENT
Start: 2023-06-15 | End: 2023-06-15 | Stop reason: HOSPADM

## 2023-06-15 RX ORDER — ACETAMINOPHEN 325 MG/1
650 TABLET ORAL
Status: COMPLETED | OUTPATIENT
Start: 2023-06-15 | End: 2023-06-15

## 2023-06-15 RX ORDER — HYDROCHLOROTHIAZIDE 25 MG/1
25 TABLET ORAL ONCE
Status: COMPLETED | OUTPATIENT
Start: 2023-06-15 | End: 2023-06-15

## 2023-06-15 RX ADMIN — LOSARTAN POTASSIUM 100 MG: 100 TABLET, FILM COATED ORAL at 11:11

## 2023-06-15 RX ADMIN — AMLODIPINE BESYLATE 5 MG: 5 TABLET ORAL at 11:11

## 2023-06-15 RX ADMIN — ACETAMINOPHEN 650 MG: 325 TABLET ORAL at 11:11

## 2023-06-15 RX ADMIN — HYDROCHLOROTHIAZIDE 25 MG: 25 TABLET ORAL at 11:11

## 2023-06-15 ASSESSMENT — ACTIVITIES OF DAILY LIVING (ADL): ADLS_ACUITY_SCORE: 35

## 2023-06-15 NOTE — Clinical Note
Mckayla Jeff was seen and treated in our emergency department on 6/15/2023.  She may return to work on 06/16/2023.       If you have any questions or concerns, please don't hesitate to call.      Jacek Haynes MD

## 2023-06-15 NOTE — ED TRIAGE NOTES
Chest pain started this morning and noted blood pressure has been high. Reports recent adjustment of blood pressure medication schedule due to work schedule change. Nitroglycerin sl given per EMS resolved chest pain.      Triage Assessment     Row Name 06/15/23 0971       Triage Assessment (Adult)    Airway WDL WDL       Respiratory WDL    Respiratory WDL WDL       Skin Circulation/Temperature WDL    Skin Circulation/Temperature WDL WDL       Cardiac WDL    Cardiac WDL X       Peripheral/Neurovascular WDL    Peripheral Neurovascular WDL WDL       Cognitive/Neuro/Behavioral WDL    Cognitive/Neuro/Behavioral WDL WDL

## 2023-06-15 NOTE — ED NOTES
Bed: ED10  Expected date:   Expected time:   Means of arrival:   [facility] 37 [facility] chest pain HTN eta 0919

## 2023-06-15 NOTE — ED PROVIDER NOTES
"  History     Chief Complaint:  Hypertension and Chest Pain    HPI   Mckayla Jeff is a 37 year old female with history of hypertension who presents with onset of chest pain around 30-40 minutes ago while at work. Patient works as a CNA and states onset was sudden while walking. Pain did not radiate and she denies any shortness of breath at the time of pain. Nitroglycerin was given en route by EMS; her chest pain has since subsided, and she now endorses a headache and some nausea. She notes that for the past couple of weeks she has been experiencing some exertional shortness of breath with alleviation while resting. She denies any chest pain with these instances. Endorses history of hypertension for which she takes medications, though she did not take them today due to forgetting. Denies smoking. Patient notes she takes hydrochlorothiazide but has been taking it intermittently and oftentimes later in the afternoon after work; she states this makes her urinate more often at night, which she also dislikes.     Independent Historian:   None - Patient Only    Review of External Notes:        Medications:    Amlodipine  Hydrochlorothiazide  Losartan  Metformin  Naproxen  Ondansetron  Potassium chloride     Past Medical History:    Hypertension  Obesity  PCOS    Physical Exam     Patient Vitals for the past 24 hrs:   BP Temp Temp src Pulse Resp SpO2 Height Weight   06/15/23 0944 (!) 163/105 99.9  F (37.7  C) Oral 94 18 96 % 1.803 m (5' 11\") 137.9 kg (304 lb)        Physical Exam  Gen: well appearing, in no acute distress  Oral : Mucous membranes moist,   Nose: No rhinorhea  Ears: External near normal, without drainage  Eyes: periorbital tissues and sclera normal   Neck: supple, no abnormal swelling  Lungs: Clear bilaterally, no tachypnea or distress, speaks full sentences  CV: Regular rate, regular rhythm  Abd: soft, nontender, nondistended, no rebound/guarding  Ext: no lower extremity edema  Skin: warm, dry, well " perfused, no rashes/bruising/lesions on exposed skin  Neuro: alert, no gross motor or sensory deficits,   Psych: pleasant mood, normal affect    Emergency Department Course   ECG  ECG taken at 0945, ECG read at 1052  Normal sinus rhythm  Normal ECG   Rate 94 bpm. MO interval 146 ms. QRS duration 82 ms. QT/QTc 372/465 ms. P-R-T axes 34 9 33.     Imaging:  Chest XR,  PA & LAT   Final Result   IMPRESSION: Negative chest. Lungs clear. Heart size and pulmonary   vascularity are within normal limits. No pleural effusions.      SRIDEVI CONLEY MD            SYSTEM ID:  [id number]         Report per radiology    Laboratory:  Labs Ordered and Resulted from Time of ED Arrival to Time of ED Departure   CBC WITH PLATELETS - Abnormal       Result Value    WBC Count 11.8 (*)     RBC Count 4.83      Hemoglobin 11.8      Hematocrit 38.8      MCV 80      MCH 24.4 (*)     MCHC 30.4 (*)     RDW 14.2      Platelet Count 414     BASIC METABOLIC PANEL - Abnormal    Sodium 140      Potassium 3.7      Chloride 103      Carbon Dioxide (CO2) 26      Anion Gap 11      Urea Nitrogen 13.1      Creatinine 0.75      Calcium 9.4      Glucose 149 (*)     GFR Estimate >90     TROPONIN T, HIGH SENSITIVITY - Normal    Troponin T, High Sensitivity <6       Emergency Department Course & Assessments:       Interventions:  Medications   ondansetron (ZOFRAN ODT) ODT tab 4 mg (has no administration in time range)   acetaminophen (TYLENOL) tablet 650 mg (has no administration in time range)   hydrochlorothiazide (HYDRODIURIL) tablet 25 mg (has no administration in time range)   losartan (COZAAR) tablet 100 mg (has no administration in time range)   amLODIPine (NORVASC) tablet 5 mg (has no administration in time range)        Assessments:  1025 I entered the patient's room and obtained history.   1053 My PA student spoke to the patient and rechecked her; I believe she is safe for discharge at this time.    Independent Interpretation (X-rays, CTs, rhythm  strip):  Chest x-ray reviewed no obvious infiltrate or mass    Consultations/Discussion of Management or Tests:  None        Social Determinants of Health affecting care:   None    Disposition:  The patient was discharged to home.     Impression & Plan    CMS Diagnoses: None    Medical Decision Making:  Patient presents emergency department after an episode of chest pain, hypertension as well.  Patient has chronic hypertension, forgot to take her medications this morning.  Typically she does not take her hydrochlorothiazide until the evening anyway as she does not want to be urinating extra during the daytime while she is working.  Her single troponin is undetectable, given her algorithm I do not feel she needs a repeat.  EKG showed no acute ischemic changes.  Suspect patient's symptoms today are due in part to elevated blood pressures.  I ordered her normal blood pressure medications which she forgot today.  Will recommend a recheck with her PCP to discuss blood pressure management in the future.  Provided work excuse for today.  Patient seems comfortable and amenable to the discharge plan.  ACS, PE and dissection seem very unlikely at this point.    Diagnosis:    ICD-10-CM    1. Hypertension, unspecified type  I10       2. Chest pain, unspecified type  R07.9            Discharge Medications:  New Prescriptions    No medications on file     Scribe Disclosure:  April SALDANA Hired, am serving as a scribe at 9:51 AM on 6/15/2023 to document services personally performed by Jacek Haynes MD based on my observations and the provider's statements to me.     6/15/2023   Jacek Haynes MD Tschetter, Paul Anthony, MD  06/15/23 1057

## 2023-06-16 ENCOUNTER — TELEPHONE (OUTPATIENT)
Dept: FAMILY MEDICINE | Facility: CLINIC | Age: 38
End: 2023-06-16
Payer: COMMERCIAL

## 2023-06-16 NOTE — TELEPHONE ENCOUNTER
Reason for Call:  Appointment Request    Patient requesting this type of appt:  Hospital/ED Follow-Up     Requested provider: Urmila Dillon    Reason patient unable to be scheduled: Not within requested timeframe    When does patient want to be seen/preferred time: Within 10 days of discharge    Comments: Patient was at [facility] and discharged on 6/15/2023. Notes are in Epic.    Could we send this information to you in [email or web address] or would you prefer to receive a phone call?:   Patient would prefer a phone call   Okay to leave a detailed message?: Yes at Cell number on file:    Telephone Information:   Mobile [phone]       Call taken on 6/16/2023 at 9:10 AM by Brittany Diallo MA

## 2023-06-29 ENCOUNTER — OFFICE VISIT (OUTPATIENT)
Dept: URGENT CARE | Facility: URGENT CARE | Age: 38
End: 2023-06-29
Payer: COMMERCIAL

## 2023-06-29 VITALS
OXYGEN SATURATION: 98 % | TEMPERATURE: 97.6 F | RESPIRATION RATE: 22 BRPM | DIASTOLIC BLOOD PRESSURE: 89 MMHG | SYSTOLIC BLOOD PRESSURE: 148 MMHG | HEART RATE: 89 BPM

## 2023-06-29 DIAGNOSIS — J06.9 UPPER RESPIRATORY TRACT INFECTION, UNSPECIFIED TYPE: Primary | ICD-10-CM

## 2023-06-29 PROCEDURE — 99213 OFFICE O/P EST LOW 20 MIN: CPT | Performed by: FAMILY MEDICINE

## 2023-06-29 RX ORDER — FLUTICASONE PROPIONATE 50 MCG
2 SPRAY, SUSPENSION (ML) NASAL DAILY
Qty: 15.8 ML | Refills: 0 | Status: SHIPPED | OUTPATIENT
Start: 2023-06-29 | End: 2023-07-29

## 2023-06-29 RX ORDER — BENZONATATE 200 MG/1
200 CAPSULE ORAL 3 TIMES DAILY PRN
Qty: 21 CAPSULE | Refills: 0 | Status: SHIPPED | OUTPATIENT
Start: 2023-06-29 | End: 2023-07-06

## 2023-06-29 NOTE — PROGRESS NOTES
"SUBJECTIVE: Mckayla Jeff is a 37 year old female presenting with a chief complaint of \"cold symptoms\".  Onset of symptoms was 1 day(s) ago.  Course of illness is same.    Severity moderate  Current and Associated symptoms: runny nose and stuffy nose  Predisposing factors include None.    Past Medical History:   Diagnosis Date     Contusion of knee 12/9/2014     Hypertension      Obesity      PCOS (polycystic ovarian syndrome)      No Known Allergies  Social History     Tobacco Use     Smoking status: Never     Smokeless tobacco: Never   Substance Use Topics     Alcohol use: No       ROS:  SKIN: no rash  GI: no vomiting    OBJECTIVE:  BP (!) 148/89   Pulse 89   Temp 97.6  F (36.4  C) (Tympanic)   Resp 22   SpO2 98% GENERAL APPEARANCE: healthy, alert and no distress  EYES: EOMI,  PERRL, conjunctiva clear  HENT: ear canals and TM's normal.  Nose and mouth without ulcers, erythema or lesions  RESP: lungs clear to auscultation - no rales, rhonchi or wheezes  SKIN: no suspicious lesions or rashes      ICD-10-CM    1. Upper respiratory tract infection, unspecified type  J06.9 Symptomatic COVID-19 Virus (Coronavirus) by PCR     fluticasone (FLONASE) 50 MCG/ACT nasal spray     benzonatate (TESSALON) 200 MG capsule          Fluids/Rest, f/u if worse/not any better    "

## 2023-06-29 NOTE — LETTER
June 29, 2023      Mckayla Jeff  [address]  [place] [address]        To Whom It May Concern:    Mckayla Jeff was seen in our clinic. She may return to work saturday without restrictions.      Sincerely,        Joesph Pugh DO

## 2023-09-17 ENCOUNTER — HEALTH MAINTENANCE LETTER (OUTPATIENT)
Age: 38
End: 2023-09-17

## 2023-09-20 ENCOUNTER — OFFICE VISIT (OUTPATIENT)
Dept: URGENT CARE | Facility: URGENT CARE | Age: 38
End: 2023-09-20
Payer: COMMERCIAL

## 2023-09-20 VITALS
OXYGEN SATURATION: 100 % | TEMPERATURE: 98.7 F | BODY MASS INDEX: 43.38 KG/M2 | SYSTOLIC BLOOD PRESSURE: 146 MMHG | RESPIRATION RATE: 24 BRPM | WEIGHT: 293 LBS | DIASTOLIC BLOOD PRESSURE: 102 MMHG | HEART RATE: 92 BPM

## 2023-09-20 DIAGNOSIS — I10 BENIGN ESSENTIAL HYPERTENSION: ICD-10-CM

## 2023-09-20 DIAGNOSIS — N76.0 BACTERIAL VAGINOSIS: Primary | ICD-10-CM

## 2023-09-20 DIAGNOSIS — N89.8 VAGINAL DISCHARGE: ICD-10-CM

## 2023-09-20 DIAGNOSIS — B96.89 BACTERIAL VAGINOSIS: Primary | ICD-10-CM

## 2023-09-20 PROCEDURE — 87086 URINE CULTURE/COLONY COUNT: CPT | Performed by: PHYSICIAN ASSISTANT

## 2023-09-20 PROCEDURE — 99213 OFFICE O/P EST LOW 20 MIN: CPT | Performed by: PHYSICIAN ASSISTANT

## 2023-09-20 PROCEDURE — 87210 SMEAR WET MOUNT SALINE/INK: CPT

## 2023-09-20 RX ORDER — METRONIDAZOLE 7.5 MG/G
1 GEL VAGINAL DAILY
Qty: 25 G | Refills: 0 | Status: SHIPPED | OUTPATIENT
Start: 2023-09-20 | End: 2024-05-14

## 2023-09-20 NOTE — PROGRESS NOTES
Patient presents with:  Urgent Care: Smelly vaginal area possible BV    (N76.0,  B96.89) Bacterial vaginosis  (primary encounter diagnosis)  Comment:   Plan: metroNIDAZOLE (METROGEL) 0.75 % vaginal gel            (N89.8) Vaginal discharge  Comment:   Plan: Wet prep - Clinic Collect            (I10) Benign essential hypertension  Comment:   Plan: take blood pressure medication daily    .  If not improving or if condition worsens, follow up with your Primary Care Provider        SUBJECTIVE:   Mckayla Jeff is a 37 year old female who presents today with vaginal odor and discharge.  Has a h/o BV.     Prefers gel medication if she needs medication.    Has not taken BP med today    Declines STI testing today    Past Medical History:   Diagnosis Date    Contusion of knee 12/9/2014    Hypertension     Obesity     PCOS (polycystic ovarian syndrome)          Current Outpatient Medications   Medication Sig Dispense Refill    Multiple Vitamins-Iron (DAILY-EMELI/IRON/BETA-CAROTENE) TABS TAKE 1 TABLET BY MOUTH DAILY. (Patient not taking: Reported on 10/19/2020) 30 tablet 7     Social History     Tobacco Use    Smoking status: Never Smoker    Smokeless tobacco: Never Used   Substance Use Topics    Alcohol use: Not on file     Family History   Problem Relation Age of Onset    Diabetes Mother     Diabetes Father          ROS:    10 point ROS of systems including Constitutional, Eyes, Respiratory, Cardiovascular, Gastroenterology, Genitourinary, Integumentary, Muscularskeletal, Psychiatric ,neurological were all negative except for pertinent positives noted in my HPI       OBJECTIVE:  BP (!) 146/102   Pulse 92   Temp 98.7  F (37.1  C) (Tympanic)   Resp 24   Wt 141.1 kg (311 lb)   SpO2 100%   BMI 43.38 kg/m    Physical Exam:  GENERAL APPEARANCE: healthy, alert and no distress  ABDOMEN:  soft, nontender, no HSM or masses and bowel sounds normal  GU_female: Deferred, self wet prep obtained.  SKIN: no suspicious lesions or  maddison      Results for orders placed or performed in visit on 09/20/23   Wet prep - Clinic Collect     Status: Abnormal    Specimen: Vagina; Swab   Result Value Ref Range    Trichomonas Absent Absent    Yeast Absent Absent    Clue Cells Present (A) Absent    WBCs/high power field None None   Urine Culture     Status: None (Preliminary result)    Specimen: Urine, Midstream   Result Value Ref Range    Culture No growth, less than 1 day

## 2023-09-21 NOTE — PATIENT INSTRUCTIONS
(N76.0,  B96.89) Bacterial vaginosis  (primary encounter diagnosis)  Comment:   Plan: metroNIDAZOLE (METROGEL) 0.75 % vaginal gel            (N89.8) Vaginal discharge  Comment:   Plan: Wet prep - Clinic Collect            (I10) Benign essential hypertension  Comment:   Plan: take blood pressure medication daily

## 2023-09-22 LAB — BACTERIA UR CULT: NORMAL

## 2023-09-29 ENCOUNTER — TELEPHONE (OUTPATIENT)
Dept: FAMILY MEDICINE | Facility: CLINIC | Age: 38
End: 2023-09-29
Payer: COMMERCIAL

## 2023-09-29 NOTE — TELEPHONE ENCOUNTER
Patient Quality Outreach    Patient is due for the following:   Diabetes -  A1C, Eye Exam, Microalbumin, and Foot Exam  Depression  -  PHQ-9 needed  Physical Preventive Adult Physical      Topic Date Due    Pneumococcal Vaccine (1 - PCV) Never done    Diptheria Tetanus Pertussis (DTAP/TDAP/TD) Vaccine (9 - Td or Tdap) 12/18/2022    Flu Vaccine (1) 09/01/2023    COVID-19 Vaccine (4 - 2023-24 season) 09/01/2023       Next Steps:   Schedule a office visit for Diabetes and office visit for  Adult Preventative    Type of outreach:    Sent [employer] message.      Questions for provider review:    None           Dasha Fletcher MA

## 2023-12-04 DIAGNOSIS — I10 ESSENTIAL HYPERTENSION WITH GOAL BLOOD PRESSURE LESS THAN 140/90: ICD-10-CM

## 2023-12-04 RX ORDER — AMLODIPINE BESYLATE 5 MG/1
5 TABLET ORAL DAILY
Qty: 30 TABLET | Refills: 0 | Status: SHIPPED | OUTPATIENT
Start: 2023-12-04 | End: 2023-12-27

## 2023-12-04 RX ORDER — HYDROCHLOROTHIAZIDE 25 MG/1
25 TABLET ORAL DAILY
Qty: 30 TABLET | Refills: 0 | Status: SHIPPED | OUTPATIENT
Start: 2023-12-04 | End: 2023-12-27

## 2023-12-04 RX ORDER — LOSARTAN POTASSIUM 100 MG/1
100 TABLET ORAL DAILY
Qty: 30 TABLET | Refills: 0 | Status: SHIPPED | OUTPATIENT
Start: 2023-12-04 | End: 2023-12-27

## 2023-12-04 NOTE — TELEPHONE ENCOUNTER
Pls contact patient and ask if she has transferred her care to [facility]. I have not seen her for over a year and her last visit with us was 2/13/23 with several visits through [facility] since.    Urmila VELÁZQUEZ, CNP

## 2023-12-04 NOTE — TELEPHONE ENCOUNTER
General Call    Contacts         Type Contact Phone/Fax    12/04/2023 08:41 AM CST Phone (Incoming) Mckayla Jeff (Self) [phone] (M)          Reason for Call:  Pt states she has not transferred care and that she does have an upcoming appt scheduled with Urmila Dillon on 12/27/2023.  Pt will be out of her medication before this appt.  Please advise.  Thank you.    Could we send this information to you in [facility] or would you prefer to receive a phone call?:   Patient would prefer a phone call   Okay to leave a detailed message?: Yes at Cell number on file:    Telephone Information:   Mobile [phone]

## 2023-12-04 NOTE — TELEPHONE ENCOUNTER
Medication Question or Refill        What medication are you calling about (include dose and sig)?: See List    Preferred Pharmacy:     [id number] - [place], [place] - [address] AT [facility] [address]  [facility] [address]  Phone: [phone] Fax: [phone]      Controlled Substance Agreement on file:   CSA -- Patient Level:    CSA: None found at the patient level.       Who prescribed the medication?: PCP    Do you need a refill? Yes    Patient offered an appointment? Yes: 12/27/2023    Do you have any questions or concerns?  No      Could we send this information to you in [employer] or would you prefer to receive a phone call?:   Patient would prefer a phone call   Okay to leave a detailed message?: Yes at Cell number on file:    Telephone Information:   Mobile [phone]

## 2023-12-11 DIAGNOSIS — E87.6 HYPOKALEMIA: ICD-10-CM

## 2023-12-11 RX ORDER — POTASSIUM CHLORIDE 750 MG/1
20 TABLET, EXTENDED RELEASE ORAL DAILY
Qty: 180 TABLET | Refills: 1 | Status: SHIPPED | OUTPATIENT
Start: 2023-12-11 | End: 2023-12-27

## 2023-12-27 ENCOUNTER — OFFICE VISIT (OUTPATIENT)
Dept: FAMILY MEDICINE | Facility: CLINIC | Age: 38
End: 2023-12-27
Payer: COMMERCIAL

## 2023-12-27 VITALS
TEMPERATURE: 98.1 F | DIASTOLIC BLOOD PRESSURE: 89 MMHG | BODY MASS INDEX: 39.7 KG/M2 | OXYGEN SATURATION: 98 % | WEIGHT: 283.6 LBS | HEART RATE: 99 BPM | SYSTOLIC BLOOD PRESSURE: 137 MMHG | HEIGHT: 71 IN | RESPIRATION RATE: 16 BRPM

## 2023-12-27 DIAGNOSIS — E66.01 CLASS 2 SEVERE OBESITY DUE TO EXCESS CALORIES WITH SERIOUS COMORBIDITY AND BODY MASS INDEX (BMI) OF 39.0 TO 39.9 IN ADULT (H): ICD-10-CM

## 2023-12-27 DIAGNOSIS — E66.812 CLASS 2 SEVERE OBESITY DUE TO EXCESS CALORIES WITH SERIOUS COMORBIDITY AND BODY MASS INDEX (BMI) OF 39.0 TO 39.9 IN ADULT (H): ICD-10-CM

## 2023-12-27 DIAGNOSIS — I10 ESSENTIAL HYPERTENSION WITH GOAL BLOOD PRESSURE LESS THAN 140/90: Primary | ICD-10-CM

## 2023-12-27 DIAGNOSIS — E87.6 HYPOKALEMIA: ICD-10-CM

## 2023-12-27 DIAGNOSIS — R80.9 TYPE 2 DIABETES MELLITUS WITH MICROALBUMINURIA, WITHOUT LONG-TERM CURRENT USE OF INSULIN (H): ICD-10-CM

## 2023-12-27 DIAGNOSIS — E11.29 TYPE 2 DIABETES MELLITUS WITH MICROALBUMINURIA, WITHOUT LONG-TERM CURRENT USE OF INSULIN (H): ICD-10-CM

## 2023-12-27 LAB
HBA1C MFR BLD: 6 % (ref 0–5.6)
POTASSIUM SERPL-SCNC: 3.6 MMOL/L (ref 3.4–5.3)

## 2023-12-27 PROCEDURE — 83036 HEMOGLOBIN GLYCOSYLATED A1C: CPT

## 2023-12-27 PROCEDURE — 90677 PCV20 VACCINE IM: CPT

## 2023-12-27 PROCEDURE — 99214 OFFICE O/P EST MOD 30 MIN: CPT | Mod: 25

## 2023-12-27 PROCEDURE — 90471 IMMUNIZATION ADMIN: CPT

## 2023-12-27 PROCEDURE — 84132 ASSAY OF SERUM POTASSIUM: CPT

## 2023-12-27 PROCEDURE — 90715 TDAP VACCINE 7 YRS/> IM: CPT

## 2023-12-27 PROCEDURE — 90686 IIV4 VACC NO PRSV 0.5 ML IM: CPT

## 2023-12-27 PROCEDURE — 90472 IMMUNIZATION ADMIN EACH ADD: CPT

## 2023-12-27 PROCEDURE — 36415 COLL VENOUS BLD VENIPUNCTURE: CPT

## 2023-12-27 RX ORDER — HYDROCHLOROTHIAZIDE 25 MG/1
25 TABLET ORAL DAILY
Qty: 90 TABLET | Refills: 3 | Status: SHIPPED | OUTPATIENT
Start: 2023-12-27

## 2023-12-27 RX ORDER — AMLODIPINE BESYLATE 5 MG/1
5 TABLET ORAL DAILY
Qty: 90 TABLET | Refills: 3 | Status: SHIPPED | OUTPATIENT
Start: 2023-12-27

## 2023-12-27 RX ORDER — POTASSIUM CHLORIDE 750 MG/1
20 TABLET, EXTENDED RELEASE ORAL DAILY
Qty: 180 TABLET | Refills: 3 | Status: SHIPPED | OUTPATIENT
Start: 2023-12-27

## 2023-12-27 RX ORDER — LOSARTAN POTASSIUM 100 MG/1
100 TABLET ORAL DAILY
Qty: 90 TABLET | Refills: 3 | Status: SHIPPED | OUTPATIENT
Start: 2023-12-27

## 2023-12-27 ASSESSMENT — PATIENT HEALTH QUESTIONNAIRE - PHQ9
SUM OF ALL RESPONSES TO PHQ QUESTIONS 1-9: 7
10. IF YOU CHECKED OFF ANY PROBLEMS, HOW DIFFICULT HAVE THESE PROBLEMS MADE IT FOR YOU TO DO YOUR WORK, TAKE CARE OF THINGS AT HOME, OR GET ALONG WITH OTHER PEOPLE: SOMEWHAT DIFFICULT
SUM OF ALL RESPONSES TO PHQ QUESTIONS 1-9: 7

## 2023-12-27 ASSESSMENT — PAIN SCALES - GENERAL: PAINLEVEL: NO PAIN (0)

## 2023-12-27 NOTE — NURSING NOTE
Prior to immunization administration, verified patients identity using patient s name and date of birth. Please see Immunization Activity for additional information.     Screening Questionnaire for Adult Immunization    Are you sick today?   No   Do you have allergies to medications, food, a vaccine component or latex?   No   Have you ever had a serious reaction after receiving a vaccination?   No   Do you have a long-term health problem with heart, lung, kidney, or metabolic disease (e.g., diabetes), asthma, a blood disorder, no spleen, complement component deficiency, a cochlear implant, or a spinal fluid leak?  Are you on long-term aspirin therapy?   Yes   Do you have cancer, leukemia, HIV/AIDS, or any other immune system problem?   No   Do you have a parent, brother, or sister with an immune system problem?   No   In the past 3 months, have you taken medications that affect  your immune system, such as prednisone, other steroids, or anticancer drugs; drugs for the treatment of rheumatoid arthritis, Crohn s disease, or psoriasis; or have you had radiation treatments?   No   Have you had a seizure, or a brain or other nervous system problem?   No   During the past year, have you received a transfusion of blood or blood    products, or been given immune (gamma) globulin or antiviral drug?   No   For women: Are you pregnant or is there a chance you could become       pregnant during the next month?   No   Have you received any vaccinations in the past 4 weeks?   No     Immunization questionnaire answers were all negative.      Patient instructed to remain in clinic for 15 minutes afterwards, and to report any adverse reactions.     Screening performed by Sophie Soto MA on 12/27/2023 at 2:38 PM.

## 2023-12-27 NOTE — PROGRESS NOTES
"  Assessment & Plan     Essential hypertension with goal blood pressure less than 140/90  - BP within goal range today. Continue medications at current doses.  - Recommended home BP monitoring 1-2 times weekly. Patient to report BP readings consistently above 140/80  - amLODIPine (NORVASC) 5 MG tablet  Dispense: 90 tablet; Refill: 3  - hydrochlorothiazide (HYDRODIURIL) 25 MG tablet  Dispense: 90 tablet; Refill: 3  - losartan (COZAAR) 100 MG tablet  Dispense: 90 tablet; Refill: 3  - Potassium    Hypokalemia  - Has been in normal range on supplementation. Will recheck today.   - potassium chloride ER (KLOR-CON M) 10 MEQ CR tablet  Dispense: 180 tablet; Refill: 3  - Potassium    Class 2 severe obesity due to excess calories with serious comorbidity and body mass index (BMI) of 39.0 to 39.9 in adult (H)  - Noted. Discussed that weight loss will improve blood pressure. Encouraged her to continue her efforts to lose weight.     Type 2 diabetes mellitus with microalbuminuria, without long-term current use of insulin (H)  - A1C and urine albumin ordered by PCP. Continue metformin.      BMI:   Estimated body mass index is 39.55 kg/m  as calculated from the following:    Height as of this encounter: 1.803 m (5' 11\").    Weight as of this encounter: 128.6 kg (283 lb 9.6 oz).   Weight management plan: Discussed healthy diet and exercise guidelines    MELANIA Flores [facility]    Colton Block is a 38 year old, presenting for the following health issues:  Recheck Medication and Hypertension        12/27/2023     1:39 PM   Additional Questions   Roomed by Sophie   Accompanied by self       History of Present Illness       Hypertension: She presents for follow up of hypertension.  She does not check blood pressure  regularly outside of the clinic. Outside blood pressures have been over 140/90. She does not follow a low salt diet.     She eats 0-1 servings of fruits and vegetables " "daily.She consumes 1 sweetened beverage(s) daily.She exercises with enough effort to increase her heart rate 9 or less minutes per day.  She exercises with enough effort to increase her heart rate 3 or less days per week.      Exercises at [employer] twice weekly. Does 30 minutes of cardio + strength training. Has lost 40 lbs since October and has changed dietary habits. Does not routinely check blood pressures but does note that they have been greatly improved since she lost weight.     Review of Systems   Constitutional, HEENT, cardiovascular, pulmonary, gi and gu systems are negative, except as otherwise noted.      Objective    /89 (BP Location: Left arm, Patient Position: Sitting, Cuff Size: Adult Large)   Pulse 99   Temp 98.1  F (36.7  C) (Oral)   Resp 16   Ht 1.803 m (5' 11\")   Wt 128.6 kg (283 lb 9.6 oz)   LMP 12/08/2023 (Approximate)   SpO2 98%   BMI 39.55 kg/m    Body mass index is 39.55 kg/m .    Physical Exam   GENERAL: healthy, alert and no distress  NECK: no adenopathy, no asymmetry, masses, or scars and thyroid normal to palpation  RESP: lungs clear to auscultation - no rales, rhonchi or wheezes  CV: regular rate and rhythm, normal S1 S2, no S3 or S4, no murmur, click or rub, no peripheral edema and peripheral pulses strong  ABDOMEN: soft, nontender, no hepatosplenomegaly, no masses and bowel sounds normal  MS: no gross musculoskeletal defects noted, no edema  PSYCH: mentation appears normal, affect normal/bright  "

## 2024-01-03 NOTE — COMMUNITY RESOURCES LIST (ENGLISH)
01/03/2024   [facility]  N/A  For questions about this resource list or additional care needs, please contact your primary care clinic or care manager.  Phone: [phone]   Email: N/A   Address: [facility] [place] [address]   Hours: N/A        Hotlines and Helplines       Hotline - Housing crisis  1  [facility] Distance: 8.12 miles      Phone/Virtual   [address] [place] [address]  Language: English  Hours: Mon - Sun Open 24 Hours  Fees: Free   Phone: (474) 630-1947 Email: info@[employer].org Website: http://www.[employer].org/     2  [facility] Distance: 8.55 miles      Phone/Virtual   [address] [place] [address]  Language: English  Hours: Mon - Sun Open 24 Hours   Phone: (111) 635-4453 Email: info@[employer] Website: http://www.[employer].org          Housing       Coordinated Entry access point  3  [facility] Distance: 7.23 miles      Phone/Virtual   [address] [place] [address]  Language: English  Hours: Mon - Fri 8:30 AM - 12:00 PM , Mon - Fri 1:00 PM - 4:00 PM  Fees: Free   Phone: (945) 413-1256 Ext.2 Email: leah@[facility] [facility].org Website: https://www.[facility].org/usn/     4  [facility] Distance: 7.43 miles      In-Person, Phone/Virtual   [address] [place] [address]  Language: English, Kenyan  Hours: Mon - Fri 10:00 AM - 5:30 PM , Mon - Sun 7:30 PM - 10:20 PM , Sat - Sun 1:00 PM - 5:30 PM  Fees: Free   Phone: (860) 823-3286 Email: info@[employer] Website: https://www.[employer].org/our-programs/adult-shelter-connect-del angel-shelter/     Drop-in center or day shelter  5  [facility] Distance: 7.27 miles      In-Person   [address] [place] [address]  Language: English, Hmong, Cape Verdean, Kenyan  Hours: Mon - u 8:30 AM - 4:30 PM , Sat - Sun 9:00 AM - 12:00 PM  Fees:  Free   Phone: (239) 971-3630 Email: info@[employer].org Website: https://[employer].org/     6  [facility] Distance: 8.75 miles      In-Person   [address] [place] [address]  Language: English  Hours: Mon - Fri 12:00 PM - 3:00 PM  Fees: Free   Phone: (392) 272-6316 Email: silviaInvested.in@[employer] Website: http://[employer]/     Housing search assistance  7  [facility] Distance: 1.62 miles      In-Person   [address] [place] [address]  Language: English  Hours: Mon - Fri 8:00 AM - 4:00 PM  Fees: Free   Phone: (866) 339-6146 Email: info@[facility].[employer] Website: https://[employer].FRUCT/     8  [facility]ighborhood [facility] [employer] [place] [employer] Distance: 2.71 miles      Phone/Virtual   [address] [place] [address]  Language: English, Pakistani  Hours: Mon - Fri 9:00 AM - 5:00 PM  Fees: Free   Phone: (905) 400-6458 Email: services@[employer] Website: https://www.[employer]     Shelter for families  9  [facility] Distance: 18.87 miles      In-Person   [address] [place] [address]  Language: English  Hours: Mon - Fri 3:00 PM - 9:00 AM , Sat - Sun Open 24 Hours  Fees: Free   Phone: (763) 606-1554 Ext.1 Website: https://www.saintandrews.org/2020/07/03/emergency-family-shelter/     Shelter for individuals  10  [facility] Distance: 7.64 miles      In-Person   [address] [place] [address]  Language: English  Hours: Mon - Fri 4:00 PM - 9:00 AM  Fees: Free   Phone: (872) 471-6386 Email: shani@[facility].org Website: https://[facility].org/northern/[facility]/     11  [facility] Distance: 9.15 miles      In-Person   [address] [place] [address]  Language: English  Hours: Mon - Sun Open 24 Hours  Fees: Free   Phone: (229) 237-6108 Email:  communications@[facility].org Website: https://[facility].org/oursaviourshousing/          Important Numbers & Websites       Emergency Services   911  [facility] Services   311  Poison Control   (136) 884-3065  Suicide Prevention Lifeline   (558) 388-9618 (TALK)  Child Abuse Hotline   (790) 806-6821 (4-A-Child)  Sexual Assault Hotline   (894) 538-8600 (HOPE)  National Runaway Safeline   (679) 858-3629 (RUNAWAY)  All-Options Talkline   (159) 371-2663  Substance Abuse Referral   (790) 123-3821 (HELP)

## 2024-01-22 ENCOUNTER — TELEPHONE (OUTPATIENT)
Dept: FAMILY MEDICINE | Facility: CLINIC | Age: 39
End: 2024-01-22

## 2024-01-22 NOTE — TELEPHONE ENCOUNTER
Patient Quality Outreach    Patient is due for the following:   Diabetes -  Microalbumin  Depression  -  PHQ-9 needed  Physical Preventive Adult Physical    Next Steps:   Schedule a Adult Preventative    Type of outreach:    Sent MEDSEEK message.    Next Steps:  Reach out within 90 days via Letter.    Max number of attempts reached: No. Will try again in 90 days if patient still on fail list.    Questions for provider review:    None           Jessica Houston MA

## 2024-02-04 ENCOUNTER — HEALTH MAINTENANCE LETTER (OUTPATIENT)
Age: 39
End: 2024-02-04

## 2024-04-14 ENCOUNTER — HEALTH MAINTENANCE LETTER (OUTPATIENT)
Age: 39
End: 2024-04-14

## 2024-05-13 ENCOUNTER — OFFICE VISIT (OUTPATIENT)
Dept: URGENT CARE | Facility: URGENT CARE | Age: 39
End: 2024-05-13
Payer: MEDICAID

## 2024-05-13 VITALS
DIASTOLIC BLOOD PRESSURE: 79 MMHG | TEMPERATURE: 97.8 F | OXYGEN SATURATION: 98 % | HEART RATE: 76 BPM | SYSTOLIC BLOOD PRESSURE: 145 MMHG

## 2024-05-13 DIAGNOSIS — B96.89 BV (BACTERIAL VAGINOSIS): ICD-10-CM

## 2024-05-13 DIAGNOSIS — Z11.3 SCREENING EXAMINATION FOR STI: ICD-10-CM

## 2024-05-13 DIAGNOSIS — N89.8 VAGINAL DISCHARGE: Primary | ICD-10-CM

## 2024-05-13 DIAGNOSIS — N76.0 BV (BACTERIAL VAGINOSIS): ICD-10-CM

## 2024-05-13 PROCEDURE — 86803 HEPATITIS C AB TEST: CPT

## 2024-05-13 PROCEDURE — 80061 LIPID PANEL: CPT

## 2024-05-13 PROCEDURE — 36415 COLL VENOUS BLD VENIPUNCTURE: CPT | Performed by: NURSE PRACTITIONER

## 2024-05-13 PROCEDURE — 87210 SMEAR WET MOUNT SALINE/INK: CPT | Performed by: NURSE PRACTITIONER

## 2024-05-13 PROCEDURE — 87389 HIV-1 AG W/HIV-1&-2 AB AG IA: CPT

## 2024-05-13 PROCEDURE — 99213 OFFICE O/P EST LOW 20 MIN: CPT | Performed by: NURSE PRACTITIONER

## 2024-05-13 RX ORDER — METRONIDAZOLE 7.5 MG/G
1 GEL VAGINAL DAILY
Qty: 25 G | Refills: 0 | Status: SHIPPED | OUTPATIENT
Start: 2024-05-13 | End: 2024-05-18

## 2024-05-14 ENCOUNTER — OFFICE VISIT (OUTPATIENT)
Dept: FAMILY MEDICINE | Facility: CLINIC | Age: 39
End: 2024-05-14
Payer: MEDICAID

## 2024-05-14 VITALS
HEIGHT: 71 IN | SYSTOLIC BLOOD PRESSURE: 130 MMHG | BODY MASS INDEX: 39.2 KG/M2 | DIASTOLIC BLOOD PRESSURE: 85 MMHG | HEART RATE: 78 BPM | WEIGHT: 280 LBS | TEMPERATURE: 97.9 F | OXYGEN SATURATION: 100 % | RESPIRATION RATE: 16 BRPM

## 2024-05-14 DIAGNOSIS — Z23 NEED FOR VACCINATION: ICD-10-CM

## 2024-05-14 DIAGNOSIS — Z11.3 SCREEN FOR STD (SEXUALLY TRANSMITTED DISEASE): Primary | ICD-10-CM

## 2024-05-14 DIAGNOSIS — E11.9 TYPE 2 DIABETES MELLITUS WITHOUT COMPLICATION, WITHOUT LONG-TERM CURRENT USE OF INSULIN (H): ICD-10-CM

## 2024-05-14 PROBLEM — E66.01 SEVERE OBESITY (BMI 35.0-39.9) WITH COMORBIDITY (H): Status: ACTIVE | Noted: 2023-12-27

## 2024-05-14 LAB
C TRACH DNA SPEC QL PROBE+SIG AMP: NEGATIVE
CHOLEST SERPL-MCNC: 146 MG/DL
CREAT UR-MCNC: 31.5 MG/DL
HCV AB SERPL QL IA: NONREACTIVE
HDLC SERPL-MCNC: 50 MG/DL
HIV 1+2 AB+HIV1 P24 AG SERPL QL IA: NONREACTIVE
LDLC SERPL CALC-MCNC: 77 MG/DL
MICROALBUMIN UR-MCNC: <12 MG/L
MICROALBUMIN/CREAT UR: NORMAL MG/G{CREAT}
N GONORRHOEA DNA SPEC QL NAA+PROBE: NEGATIVE
NONHDLC SERPL-MCNC: 96 MG/DL
TRIGL SERPL-MCNC: 97 MG/DL

## 2024-05-14 PROCEDURE — 82570 ASSAY OF URINE CREATININE: CPT | Performed by: FAMILY MEDICINE

## 2024-05-14 PROCEDURE — 99213 OFFICE O/P EST LOW 20 MIN: CPT | Performed by: FAMILY MEDICINE

## 2024-05-14 PROCEDURE — 87491 CHLMYD TRACH DNA AMP PROBE: CPT | Performed by: FAMILY MEDICINE

## 2024-05-14 PROCEDURE — 91320 SARSCV2 VAC 30MCG TRS-SUC IM: CPT | Performed by: FAMILY MEDICINE

## 2024-05-14 PROCEDURE — 90480 ADMN SARSCOV2 VAC 1/ONLY CMP: CPT | Performed by: FAMILY MEDICINE

## 2024-05-14 PROCEDURE — 87591 N.GONORRHOEAE DNA AMP PROB: CPT | Performed by: FAMILY MEDICINE

## 2024-05-14 PROCEDURE — 82043 UR ALBUMIN QUANTITATIVE: CPT | Performed by: FAMILY MEDICINE

## 2024-05-14 ASSESSMENT — PAIN SCALES - GENERAL: PAINLEVEL: MODERATE PAIN (5)

## 2024-05-14 ASSESSMENT — PATIENT HEALTH QUESTIONNAIRE - PHQ9
10. IF YOU CHECKED OFF ANY PROBLEMS, HOW DIFFICULT HAVE THESE PROBLEMS MADE IT FOR YOU TO DO YOUR WORK, TAKE CARE OF THINGS AT HOME, OR GET ALONG WITH OTHER PEOPLE: NOT DIFFICULT AT ALL
SUM OF ALL RESPONSES TO PHQ QUESTIONS 1-9: 7
SUM OF ALL RESPONSES TO PHQ QUESTIONS 1-9: 7

## 2024-05-14 NOTE — PATIENT INSTRUCTIONS
Results for orders placed or performed in visit on 05/13/24   Wet prep - Clinic Collect     Status: Abnormal    Specimen: Vagina; Swab   Result Value Ref Range    Trichomonas Absent Absent    Yeast Absent Absent    Clue Cells Present (A) Absent    WBCs/high power field 2+ (A) None     Your wet prep shows that you have bacterial vaginosis (overgrowth of a bacteria called clue cells).  This is not a sexually transmitted disease.  I sent in a prescription for metrogel which is an antibiotic that you insert vaginally will  daily for 5 days.  \    STI screening is pending.

## 2024-05-14 NOTE — PROGRESS NOTES
"  Assessment & Plan     (Z11.3) Screen for STD (sexually transmitted disease)  (primary encounter diagnosis)  Comment: She wants to complete asymptomatic screening for CT/NG, but she keeps having to urinate which makes \"dirty\" urine collection difficult.  Plan: Chlamydia trachomatis/Neisseria gonorrhoeae by         PCR        Switch to self collection of vaginal sample. Follow up as needed.     (E11.9) Type 2 diabetes mellitus without complication, without long-term current use of insulin (H)  Comment: Lab update.   Plan: Lipid panel reflex to direct LDL Non-fasting,         Albumin Random Urine Quantitative with Creat         Ratio         Return in about 6 weeks (around 6/27/2024) for full physical, diabetes.      (Z23) Need for vaccination  Comment:   Plan: COVID-19 12+ (2023-24) (PFIZER)                Colton Block is a 38 year old, presenting for the following health issues:  STD        5/14/2024     7:40 AM   Additional Questions   Roomed by Alexander     History of Present Illness       Reason for visit:  Std  Symptoms include:  I dont have any    She eats 0-1 servings of fruits and vegetables daily.She consumes 4 sweetened beverage(s) daily.She exercises with enough effort to increase her heart rate 9 or less minutes per day.  She exercises with enough effort to increase her heart rate 3 or less days per week. She is missing 7 dose(s) of medications per week.       Review of Systems       Objective    /85 (BP Location: Left arm, Patient Position: Sitting, Cuff Size: Adult Large)   Pulse 78   Temp 97.9  F (36.6  C) (Tympanic)   Resp 16   Ht 1.803 m (5' 11\")   Wt 127 kg (280 lb)   LMP 04/17/2024   SpO2 100%   BMI 39.05 kg/m    Body mass index is 39.05 kg/m .  Physical Exam   GENERAL: healthy, alert and no distress  EYES: Eyes grossly normal to inspection, PERRL, EOMI, sclerae white and conjunctivae normal  MS: no gross musculoskeletal defects noted, no edema  SKIN: no suspicious lesions or " rashes to visible skin  NEURO: Normal strength and tone, sensory exam grossly normal, mentation intact, oriented times 3 and cranial nerves 2-12 intact  PSYCH: mentation appears normal, affect normal/bright             Signed Electronically by: Tahir Pedraza MD      The information in this document, created by the medical scribe for me, accurately reflects the services I personally performed and the decisions made by me. I have reviewed and approved this document for accuracy prior to signature.  Jorge Chavarria

## 2024-05-14 NOTE — PROGRESS NOTES
Assessment & Plan     Vaginal discharge  Your wet prep shows that you have bacterial vaginosis (overgrowth of a bacteria called clue cells).  This is not a sexually transmitted disease.  I sent in a prescription for metrogel which is an antibiotic that you will take daily for 5 days.      - Wet prep - Clinic Collect    Screening examination for STI  - HIV Antigen Antibody Combo  - Hepatitis C antibody    BV (bacterial vaginosis)  - metroNIDAZOLE (METROGEL) 0.75 % vaginal gel  Dispense: 25 g; Refill: 0     Patient Instructions     Results for orders placed or performed in visit on 05/13/24   Wet prep - Clinic Collect     Status: Abnormal    Specimen: Vagina; Swab   Result Value Ref Range    Trichomonas Absent Absent    Yeast Absent Absent    Clue Cells Present (A) Absent    WBCs/high power field 2+ (A) None     Your wet prep shows that you have bacterial vaginosis (overgrowth of a bacteria called clue cells).  This is not a sexually transmitted disease.  I sent in a prescription for metrogel which is an antibiotic that you insert vaginally will  daily for 5 days.  \    STI screening is pending.          Return in about 1 week (around 5/20/2024) for with regular provider if symptoms persist.    MELANIA Clement CNP  [facility] SHELDON Block is a 38 year old female who presents to clinic today for the following health issues:  Chief Complaint   Patient presents with    Urgent Care     Vaginal odor     HPI    GYN Complaint    Onset of symptoms was 2 day(s) ago.  Course of illness is same.    Severity moderate  Current and Associated symptoms: STD exposure and odor  Treatment measures tried include:  None  Sexually active: yes, single partner, contraception - condoms  Predisposing factors: None  Hx of previous symptom: frequent    Review of Systems  Constitutional, HEENT, cardiovascular, pulmonary, gi and gu systems are negative, except as otherwise noted.      Objective    BP  (!) 145/79   Pulse 76   Temp 97.8  F (36.6  C) (Temporal)   SpO2 98%   Physical Exam   GENERAL: alert and no distress  RESP: lungs clear to auscultation - no rales, rhonchi or wheezes  CV: regular rate and rhythm, normal S1 S2, no S3 or S4, no murmur, click or rub, no peripheral edema  MS: no gross musculoskeletal defects noted, no edema  SKIN: no suspicious lesions or rashes

## 2024-05-24 ENCOUNTER — HOSPITAL ENCOUNTER (EMERGENCY)
Facility: CLINIC | Age: 39
Discharge: HOME | End: 2024-05-24
Attending: EMERGENCY MEDICINE | Admitting: EMERGENCY MEDICINE
Payer: MEDICAID

## 2024-05-24 ENCOUNTER — APPOINTMENT (OUTPATIENT)
Dept: ULTRASOUND IMAGING | Facility: CLINIC | Age: 39
End: 2024-05-24
Attending: EMERGENCY MEDICINE
Payer: MEDICAID

## 2024-05-24 VITALS
TEMPERATURE: 98.7 F | DIASTOLIC BLOOD PRESSURE: 83 MMHG | BODY MASS INDEX: 37.8 KG/M2 | WEIGHT: 270 LBS | OXYGEN SATURATION: 95 % | HEART RATE: 84 BPM | HEIGHT: 71 IN | SYSTOLIC BLOOD PRESSURE: 143 MMHG | RESPIRATION RATE: 20 BRPM

## 2024-05-24 DIAGNOSIS — N93.9 VAGINAL BLEEDING: ICD-10-CM

## 2024-05-24 LAB
ABO/RH(D): NORMAL
ANTIBODY SCREEN: NEGATIVE
BASOPHILS # BLD AUTO: 0.1 10E3/UL (ref 0–0.2)
BASOPHILS NFR BLD AUTO: 1 %
CLUE CELLS: ABNORMAL
EOSINOPHIL # BLD AUTO: 0.4 10E3/UL (ref 0–0.7)
EOSINOPHIL NFR BLD AUTO: 4 %
ERYTHROCYTE [DISTWIDTH] IN BLOOD BY AUTOMATED COUNT: 15.4 % (ref 10–15)
HCG INTACT+B SERPL-ACNC: 11 MIU/ML
HCG SER QL IA.RAPID: NORMAL
HCT VFR BLD AUTO: 39 % (ref 35–47)
HGB BLD-MCNC: 12 G/DL (ref 11.7–15.7)
IMM GRANULOCYTES # BLD: 0 10E3/UL
IMM GRANULOCYTES NFR BLD: 0 %
LYMPHOCYTES # BLD AUTO: 2.3 10E3/UL (ref 0.8–5.3)
LYMPHOCYTES NFR BLD AUTO: 22 %
MCH RBC QN AUTO: 24 PG (ref 26.5–33)
MCHC RBC AUTO-ENTMCNC: 30.8 G/DL (ref 31.5–36.5)
MCV RBC AUTO: 78 FL (ref 78–100)
MONOCYTES # BLD AUTO: 0.7 10E3/UL (ref 0–1.3)
MONOCYTES NFR BLD AUTO: 7 %
NEUTROPHILS # BLD AUTO: 6.8 10E3/UL (ref 1.6–8.3)
NEUTROPHILS NFR BLD AUTO: 66 %
NRBC # BLD AUTO: 0 10E3/UL
NRBC BLD AUTO-RTO: 0 /100
PLATELET # BLD AUTO: 453 10E3/UL (ref 150–450)
RBC # BLD AUTO: 4.99 10E6/UL (ref 3.8–5.2)
SPECIMEN EXPIRATION DATE: NORMAL
TRICHOMONAS, WET PREP: ABNORMAL
WBC # BLD AUTO: 10.3 10E3/UL (ref 4–11)
WBC'S/HIGH POWER FIELD, WET PREP: ABNORMAL
YEAST, WET PREP: ABNORMAL

## 2024-05-24 PROCEDURE — 99284 EMERGENCY DEPT VISIT MOD MDM: CPT | Mod: 25

## 2024-05-24 PROCEDURE — 86900 BLOOD TYPING SEROLOGIC ABO: CPT | Performed by: EMERGENCY MEDICINE

## 2024-05-24 PROCEDURE — 85025 COMPLETE CBC W/AUTO DIFF WBC: CPT | Performed by: EMERGENCY MEDICINE

## 2024-05-24 PROCEDURE — 84703 CHORIONIC GONADOTROPIN ASSAY: CPT

## 2024-05-24 PROCEDURE — 84702 CHORIONIC GONADOTROPIN TEST: CPT | Performed by: EMERGENCY MEDICINE

## 2024-05-24 PROCEDURE — 87491 CHLMYD TRACH DNA AMP PROBE: CPT | Performed by: EMERGENCY MEDICINE

## 2024-05-24 PROCEDURE — 36415 COLL VENOUS BLD VENIPUNCTURE: CPT | Performed by: EMERGENCY MEDICINE

## 2024-05-24 PROCEDURE — 87210 SMEAR WET MOUNT SALINE/INK: CPT | Performed by: EMERGENCY MEDICINE

## 2024-05-24 PROCEDURE — 76801 OB US < 14 WKS SINGLE FETUS: CPT

## 2024-05-24 ASSESSMENT — COLUMBIA-SUICIDE SEVERITY RATING SCALE - C-SSRS
2. HAVE YOU ACTUALLY HAD ANY THOUGHTS OF KILLING YOURSELF IN THE PAST MONTH?: NO
6. HAVE YOU EVER DONE ANYTHING, STARTED TO DO ANYTHING, OR PREPARED TO DO ANYTHING TO END YOUR LIFE?: NO
1. IN THE PAST MONTH, HAVE YOU WISHED YOU WERE DEAD OR WISHED YOU COULD GO TO SLEEP AND NOT WAKE UP?: NO

## 2024-05-24 ASSESSMENT — ACTIVITIES OF DAILY LIVING (ADL)
ADLS_ACUITY_SCORE: 35

## 2024-05-24 NOTE — ED TRIAGE NOTES
Pt states her vaginal bleeding is heavy and passed 2 large clots size of golf ball. LMP 4/17/24. Pt complains of abdominal pain and back pain that has improved after taking ibuprofen.     Triage Assessment (Adult)       Row Name 05/24/24 1414          Triage Assessment    Airway WDL WDL        Respiratory WDL    Respiratory WDL WDL        Skin Circulation/Temperature WDL    Skin Circulation/Temperature WDL WDL        Cardiac WDL    Cardiac WDL WDL        Peripheral/Neurovascular WDL    Peripheral Neurovascular WDL WDL        Cognitive/Neuro/Behavioral WDL    Cognitive/Neuro/Behavioral WDL WDL

## 2024-05-24 NOTE — ED PROVIDER NOTES
"  Emergency Department Note      History of Present Illness     Chief Complaint  Vaginal Bleeding and Abdominal Pain    HPI  Mckayla Jeff is a 38 year old female who presents emergency department for vaginal bleeding and abdominal cramping.  Patient reports for the last week she has felt off.  Her breasts were tender a week ago.  She is late for her period.  She has never been pregnant in the past.  She reports that her bleeding small different from her normal menstrual cycle.  She is not normally late with her menstrual cycle.  She is sexually active.  She was recently tested for STDs and was negative.  She is with the same partner.  She is not on birth control.  No dysuria or frequency.  No chest pain or shortness of breath.    Independent Historian  None    Review of External Notes  Reviewed STD screening from 5/14/2024.  Past Medical History   Medical History and Problem List  Past Medical History:   Diagnosis Date    Contusion of knee 12/09/2014    Hypertension     Obesity     Obesity, Class III, BMI 40-49.9 (morbid obesity) (H)     Other chest pain 10/14/2019    PCOS (polycystic ovarian syndrome)        Medications  amLODIPine (NORVASC) 5 MG tablet  hydrochlorothiazide (HYDRODIURIL) 25 MG tablet  losartan (COZAAR) 100 MG tablet  metFORMIN (GLUCOPHAGE XR) 500 MG 24 hr tablet  naproxen (NAPROSYN) 500 MG tablet  ondansetron (ZOFRAN ODT) 4 MG ODT tab  potassium chloride ER (KLOR-CON M) 10 MEQ CR tablet        Surgical History   Past Surgical History:   Procedure Laterality Date    NO HISTORY OF SURGERY       Physical Exam   Patient Vitals for the past 24 hrs:   BP Temp Temp src Pulse Resp SpO2 Height Weight   05/24/24 1416 (!) 156/95 -- -- -- -- -- -- --   05/24/24 1413 -- 98.7  F (37.1  C) Temporal 99 16 100 % 1.803 m (5' 11\") 122.5 kg (270 lb)     Physical Exam  General: Resting on the bed.  Head: No obvious trauma to head.  Ears, Nose, Throat:  External ears normal.  Nose normal.    Eyes:  Conjunctivae " clear.  Pupils are equal, round, and reactive.   Neck: Normal range of motion.  Neck supple.   CV: Regular rate and rhythm.  No murmurs.      Respiratory: Effort normal and breath sounds normal.  No wheezing or crackles.   Gastrointestinal: Soft.  No distension. There is no tenderness.    Neuro: Alert. Moving all extremities appropriately.  Normal speech.    Skin: Skin is warm and dry.  No rash noted.   Pelvic: Normal external genitalia.  No lesions or trauma.  Normal appearing cervix without CMT or adnexal tenderness.  Bleeding noted from cervical os.  Small amount of blood in the vaginal vault.    Diagnostics   Lab Results   Labs Ordered and Resulted from Time of ED Arrival to Time of ED Departure   HCG QUANTITATIVE PREGNANCY - Abnormal       Result Value    hCG Quantitative 11 (*)    CBC WITH PLATELETS AND DIFFERENTIAL - Abnormal    WBC Count 10.3      RBC Count 4.99      Hemoglobin 12.0      Hematocrit 39.0      MCV 78      MCH 24.0 (*)     MCHC 30.8 (*)     RDW 15.4 (*)     Platelet Count 453 (*)     % Neutrophils 66      % Lymphocytes 22      % Monocytes 7      % Eosinophils 4      % Basophils 1      % Immature Granulocytes 0      NRBCs per 100 WBC 0      Absolute Neutrophils 6.8      Absolute Lymphocytes 2.3      Absolute Monocytes 0.7      Absolute Eosinophils 0.4      Absolute Basophils 0.1      Absolute Immature Granulocytes 0.0      Absolute NRBCs 0.0     WET PREPARATION - Abnormal    Trichomonas Absent      Yeast Absent      Clue Cells Absent      WBCs/high power field 1+ (*)    ISTAT HCG QUALITATIVE PREGNANCY POCT - Normal    HCG Qualitative POCT Indeterminate     TYPE AND SCREEN, ADULT    ABO/RH(D) O POS      Antibody Screen Negative      SPECIMEN EXPIRATION DATE [id number]     CHLAMYDIA TRACHOMATIS/NEISSERIA GONORRHOEAE BY PCR   ABO/RH TYPE AND SCREEN       Imaging  US OB <14 Weeks W Transvaginal   Final Result   IMPRESSION:    1.  No intrauterine pregnancy identified, no gestational sac, embryo  or heartbeat. Presumably the sonographic appearance and reported beta-hCG value relate to recent pregnancy loss.   2.  Unusual appearance of right ovary regions of increased echogenicity and shadowing that may relate to calcifications but obscures some of the ovarian parenchyma. Ovary remains normal in size. Consider follow-up exam in 3-4 months to reevaluate.                Independent Interpretation  None  ED Course    Medications Administered  Medications - No data to display    Procedures  Procedures     Discussion of Management  None    Social Determinants of Health adding to complexity of care  None    ED Course  ED Course as of 05/24/24 1750   Fri May 24, 2024   1746 I reassessed the patient.  Patient is agreeable for discharge home.     Medical Decision Making / Diagnosis   CMS Diagnoses: None    MIPS     None    MDM  Mckayla Jeff is a 38 year old female who presents to the ER with vaginal bleeding.  Patient mildly hypertensive but otherwise unremarkable.  Broad differentials pursued include not limited to vaginal bleeding, anemia, dysfunctional uterine bleeding, pregnancy, ectopic, heterotopic, miscarriage, etc.  CBC without leukocytosis or anemia.  Quant is elevated 11 concerning for possible miscarriage.  Wet prep is negative.  Ultrasound shows no signs of pregnancy.  Calcification of the right ovary.  Recommending ultrasound follow-up.  Patient's bleeding is minimal.  She has some bleeding from the cervical os but no laceration or trauma etc.  At this point I suspect miscarriage.  I recommend follow-up with repeat quant next week to ensure this resolves.  Her bleeding has improved.  She seems stable for discharge.  Wet prep is negative.  Gonorrhea chlamydia are pending.  She will follow-up with her primary care doctor.  Return precautions provided and she was discharged home.  Recommended worsening bleeding, lightheadedness or dizziness etc.    Disposition  The patient was discharged.     ICD-10  Codes:    ICD-10-CM    1. Vaginal bleeding  N93.9     presumed early pregnancy loss           Discharge Medications  New Prescriptions    No medications on file         MD Matt Mullen Jennifer L, MD  05/24/24 2762

## 2024-05-24 NOTE — ED NOTES
Bed: ED22  Expected date:   Expected time:   Means of arrival:   Comments:  [age]M light headed and forgetful elevated BP

## 2024-05-24 NOTE — DISCHARGE INSTRUCTIONS
Please call your primary doctor for repeat hCG test early next week.    Please see your PCP or OB in the next 48 hours for a repeat beta HCG test.  Return to the ED if notice increasing bleeding, bleeding through >1 pad per hour for more than 1 hour, lightheaded or dizzy, abdominal pain or other acute changes.    I suspect this is early pregnancy loss also known as a miscarriage.  You may use Tylenol as needed for pain.  Heat packs as well.    I do recommend that you have a follow-up ultrasound with your OB/GYN.    Discharge Instructions  Vaginal Bleeding in Pregnancy    Bleeding in early pregnancy can be a sign of a miscarriage or an abnormal pregnancy, but often is innocent and the pregnancy will continue normally. We may do blood pregnancy tests and ultrasound to try to determine what is causing the bleeding, but sometimes we are unable to tell. If this is the case, it will often require more time, more blood tests, and another ultrasound.     Generally, every Emergency Department visit should have a follow-up clinic visit with either a primary or a specialty clinic/provider. Please follow-up as instructed by your emergency provider today.    Return to the Emergency Department if:  You have severe abdominal (belly) or pelvic pain.  You faint, or feel lightheaded or dizzy.   Your bleeding gets much worse.  You pass any tissue--solid material that does not look like a blood clot. If you pass tissue, save it (even if you have to pull it out of the toilet) and put it in a plastic bag or jar and bring it in.    You have a fever of 100.5 F or higher.  If no pregnancy could be seen:  It may be that everything is normal and it is just too early to see the pregnancy. It is also possible that you could have an ectopic pregnancy, which is a pregnancy in an abnormal location, such as in the tube ( tubal pregnancy ). We don t see evidence that this is likely today but we cannot exclude it with certainty until a pregnancy can  be seen in the uterus (womb) and an ectopic pregnancy can cause severe internal bleeding or death so follow-up is very important.  You should not be alone, in case you suddenly become very sick.   You should not have sex or put anything in your vagina.     If a pregnancy was seen in your uterus:  If a heartbeat could be seen, the chance of miscarriage is lower but because you had bleeding the chance of a miscarriage still exists.  You should not have sex or put anything in your vagina.  Follow-up as directed with your OB/GYN provider.     Facts about miscarriage: We hope you do not have a miscarriage, but if you do, here are important things to know:  Early miscarriage is very common, and having one miscarriage does not mean you will have problems with another pregnancy.  Nothing you did caused it. Taking medicine, drinking alcohol, having sex, exercising, or falling down will not cause a miscarriage.     If you were given a prescription for medicine here today, be sure to read all of the information (including the package insert) that comes with your prescription.  This will include important information about the medicine, its side effects, and any warnings that you need to know about.  The pharmacist who fills the prescription can provide more information and answer questions you may have about the medicine.  If you have questions or concerns that the pharmacist cannot address, please call or return to the Emergency Department.   Remember that you can always come back to the Emergency Department if you are not able to see your regular provider in the amount of time listed above, if you get any new symptoms, or if there is anything that worries you.

## 2024-05-25 LAB
C TRACH DNA SPEC QL PROBE+SIG AMP: NEGATIVE
N GONORRHOEA DNA SPEC QL NAA+PROBE: NEGATIVE

## 2024-05-30 ENCOUNTER — OFFICE VISIT (OUTPATIENT)
Dept: FAMILY MEDICINE | Facility: CLINIC | Age: 39
End: 2024-05-30
Payer: MEDICAID

## 2024-05-30 VITALS
RESPIRATION RATE: 16 BRPM | HEART RATE: 79 BPM | SYSTOLIC BLOOD PRESSURE: 128 MMHG | DIASTOLIC BLOOD PRESSURE: 90 MMHG | OXYGEN SATURATION: 100 % | HEIGHT: 71 IN | TEMPERATURE: 96.9 F | BODY MASS INDEX: 38.78 KG/M2 | WEIGHT: 277 LBS

## 2024-05-30 DIAGNOSIS — F41.9 ANXIETY AND DEPRESSION: ICD-10-CM

## 2024-05-30 DIAGNOSIS — R93.89 ABNORMAL FINDING ON IMAGING: ICD-10-CM

## 2024-05-30 DIAGNOSIS — N94.89 VAGINAL BURNING: ICD-10-CM

## 2024-05-30 DIAGNOSIS — N76.0 BACTERIAL VAGINOSIS: ICD-10-CM

## 2024-05-30 DIAGNOSIS — B96.89 BACTERIAL VAGINOSIS: ICD-10-CM

## 2024-05-30 DIAGNOSIS — I10 ESSENTIAL HYPERTENSION WITH GOAL BLOOD PRESSURE LESS THAN 140/90: ICD-10-CM

## 2024-05-30 DIAGNOSIS — F32.A ANXIETY AND DEPRESSION: ICD-10-CM

## 2024-05-30 DIAGNOSIS — N93.9 VAGINAL BLEEDING: Primary | ICD-10-CM

## 2024-05-30 LAB
ANION GAP SERPL CALCULATED.3IONS-SCNC: 11 MMOL/L (ref 7–15)
BUN SERPL-MCNC: 15.7 MG/DL (ref 6–20)
CALCIUM SERPL-MCNC: 9.3 MG/DL (ref 8.6–10)
CHLORIDE SERPL-SCNC: 102 MMOL/L (ref 98–107)
CLUE CELLS: PRESENT
CREAT SERPL-MCNC: 0.77 MG/DL (ref 0.51–0.95)
DEPRECATED HCO3 PLAS-SCNC: 28 MMOL/L (ref 22–29)
EGFRCR SERPLBLD CKD-EPI 2021: >90 ML/MIN/1.73M2
GLUCOSE SERPL-MCNC: 96 MG/DL (ref 70–99)
HCG INTACT+B SERPL-ACNC: <1 MIU/ML
POTASSIUM SERPL-SCNC: 3.7 MMOL/L (ref 3.4–5.3)
SODIUM SERPL-SCNC: 141 MMOL/L (ref 135–145)
TRICHOMONAS, WET PREP: ABNORMAL
WBC'S/HIGH POWER FIELD, WET PREP: ABNORMAL
YEAST, WET PREP: ABNORMAL

## 2024-05-30 PROCEDURE — 99213 OFFICE O/P EST LOW 20 MIN: CPT

## 2024-05-30 PROCEDURE — 36415 COLL VENOUS BLD VENIPUNCTURE: CPT

## 2024-05-30 PROCEDURE — 80048 BASIC METABOLIC PNL TOTAL CA: CPT

## 2024-05-30 PROCEDURE — 84702 CHORIONIC GONADOTROPIN TEST: CPT

## 2024-05-30 PROCEDURE — 87210 SMEAR WET MOUNT SALINE/INK: CPT

## 2024-05-30 RX ORDER — METRONIDAZOLE 500 MG/1
500 TABLET ORAL 2 TIMES DAILY
Qty: 14 TABLET | Refills: 0 | Status: SHIPPED | OUTPATIENT
Start: 2024-05-30 | End: 2024-06-06

## 2024-05-30 ASSESSMENT — PAIN SCALES - GENERAL: PAINLEVEL: NO PAIN (0)

## 2024-05-30 NOTE — PROGRESS NOTES
Assessment & Plan     Vaginal bleeding  - Patient seen in ER 5/24 for vaginal bleeding and cramping. HCG quantitative was mildly elevated. Bleeding likely due to early pregnancy loss. She had heavy bleeding with cramps over the weekend, bleeding resolved 2 days ago.   - Will recheck HCG to ensure return to normal.  - HCG quantitative pregnancy    Vaginal burning  - Will evaluate for yeast infection/BV.   - Wet prep - lab collect    Essential hypertension with goal blood pressure less than 140/90  - BP well-controlled. Due for labs.   - BASIC METABOLIC PANEL    Anxiety and depression  - Patient reports ongoing depressive symptoms and anxiety. Interested in therapy. Denies SI/HI. Not currently interested in medications.   - Adult Mental Health  Referral    Abnormal finding on imaging  - US in ED shows: Unusual appearance of right ovary regions of increased echogenicity and shadowing that may relate to calcifications but obscures some of the ovarian parenchyma. Ovary remains normal in size. Consider follow-up exam in 3-4 months to reevaluate.   - 3 month follow up ordered.     MED REC REQUIRED{Post Medication Reconciliation Status:  Discharge medications reconciled, continue medications without change    Follow up as needed based on HCG results.     Colton Block is a 38 year old, presenting for the following health issues:  ER F/U (Miscarriage/)      5/30/2024     9:51 AM   Additional Questions   Roomed by es   Accompanied by self     History of Present Illness       Reason for visit:  Hcg testing    She eats 0-1 servings of fruits and vegetables daily.She consumes 4 sweetened beverage(s) daily.She exercises with enough effort to increase her heart rate 30 to 60 minutes per day.  She exercises with enough effort to increase her heart rate 3 or less days per week. She is missing 2 dose(s) of medications per week.     ED/UC Followup:    Facility:  [facility] Emergency Dept   Date  "of visit: 5/24/24  Reason for visit: Vaginal bleeding, stomach pain   Current Status: better    Patient seen in ER 5/24 for vaginal bleeding and cramping that started that day. HCG quantitative was mildly elevated. Bleeding likely due to early pregnancy loss. She had heavy bleeding with cramps and large clots over the weekend, bleeding resolved 2 days ago. She is having some vaginal itching and burning.     US OB < 14 weeks: No intrauterine pregnancy identified, no gestational sac, embryo or heartbeat. Presumably the sonographic appearance and reported beta-hCG value relate to recent pregnancy loss.     Unusual appearance of right ovary regions of increased echogenicity and shadowing that may relate to calcifications but obscures some of the ovarian parenchyma. Ovary remains normal in size. Consider follow-up exam in 3-4 months to reevaluate.     Labs reviewed: Chlamydia and GC negative. HCG qualitative negative, HCG quantitative 11mIU/mL. CBC stable.     Review of Systems  Constitutional, HEENT, cardiovascular, pulmonary, gi and gu systems are negative, except as otherwise noted.      Objective    BP (!) 128/90 (BP Location: Right arm, Patient Position: Sitting, Cuff Size: Adult Large)   Pulse 79   Temp 96.9  F (36.1  C) (Tympanic)   Resp 16   Ht 1.803 m (5' 11\")   Wt 125.6 kg (277 lb)   LMP 04/17/2024 (Exact Date)   SpO2 100%   BMI 38.63 kg/m    Body mass index is 38.63 kg/m .    Physical Exam   GENERAL: alert and no distress  NECK: no adenopathy, no asymmetry, masses, or scars  RESP: lungs clear to auscultation - no rales, rhonchi or wheezes  CV: regular rate and rhythm, normal S1 S2, no S3 or S4, no murmur, click or rub, no peripheral edema  ABDOMEN: soft, nontender, no hepatosplenomegaly, no masses and bowel sounds normal  MS: no gross musculoskeletal defects noted, no edema  NEURO: Normal strength and tone, mentation intact and speech normal  PSYCH: mentation appears normal, affect " normal/bright    Signed Electronically by: MELANIA Flores CNP

## 2024-08-20 ENCOUNTER — OFFICE VISIT (OUTPATIENT)
Dept: URGENT CARE | Facility: URGENT CARE | Age: 39
End: 2024-08-20
Payer: COMMERCIAL

## 2024-08-20 VITALS
DIASTOLIC BLOOD PRESSURE: 90 MMHG | HEART RATE: 86 BPM | OXYGEN SATURATION: 97 % | SYSTOLIC BLOOD PRESSURE: 141 MMHG | TEMPERATURE: 98.5 F

## 2024-08-20 DIAGNOSIS — Z71.1 WORRIED WELL: Primary | ICD-10-CM

## 2024-08-20 PROCEDURE — 99213 OFFICE O/P EST LOW 20 MIN: CPT | Performed by: FAMILY MEDICINE

## 2024-08-20 NOTE — PROGRESS NOTES
SUBJECTIVE: Mckayla Jeff is a 38 year old female presenting with a chief complaint of possible FB vag.  No symptoms    Past Medical History:   Diagnosis Date    Contusion of knee 12/09/2014    Hypertension     Obesity     Obesity, Class III, BMI 40-49.9 (morbid obesity) (H)     Other chest pain 10/14/2019    PCOS (polycystic ovarian syndrome)      No Known Allergies  Social History     Tobacco Use    Smoking status: Never     Passive exposure: Never    Smokeless tobacco: Never   Substance Use Topics    Alcohol use: No       ROS:  SKIN: no rash  GI: no vomiting    OBJECTIVE:  BP (!) 141/90   Pulse 86   Temp 98.5  F (36.9  C)   LMP  (LMP Unknown)   SpO2 97% GENERAL APPEARANCE: healthy, alert and no distress  GU_female: external genitalia normal, vagina normal without discharge, urethra without abnormality or discharge, cervix normal in appearance with no cervical motion tenderness, exam chaperoned by nurse, no FB  SKIN: no suspicious lesions or rashes      ICD-10-CM    1. Worried well  Z71.1           Fluids/Rest, f/u if worse/not any better

## 2024-09-01 ENCOUNTER — HEALTH MAINTENANCE LETTER (OUTPATIENT)
Age: 39
End: 2024-09-01

## 2024-11-10 ENCOUNTER — HEALTH MAINTENANCE LETTER (OUTPATIENT)
Age: 39
End: 2024-11-10

## 2024-12-26 ENCOUNTER — OFFICE VISIT (OUTPATIENT)
Dept: URGENT CARE | Facility: URGENT CARE | Age: 39
End: 2024-12-26
Payer: COMMERCIAL

## 2024-12-26 VITALS
TEMPERATURE: 98 F | RESPIRATION RATE: 18 BRPM | SYSTOLIC BLOOD PRESSURE: 126 MMHG | BODY MASS INDEX: 38.77 KG/M2 | DIASTOLIC BLOOD PRESSURE: 87 MMHG | HEART RATE: 86 BPM | WEIGHT: 278 LBS | OXYGEN SATURATION: 97 %

## 2024-12-26 DIAGNOSIS — S61.211A LACERATION OF LEFT INDEX FINGER WITHOUT FOREIGN BODY WITHOUT DAMAGE TO NAIL, INITIAL ENCOUNTER: Primary | ICD-10-CM

## 2024-12-26 NOTE — PROGRESS NOTES
"  Assessment & Plan   Problem List Items Addressed This Visit    None  Visit Diagnoses       Laceration of left index finger without foreign body without damage to nail, initial encounter    -  Primary           Patient is advised to keep the area clean and dry advised of signs of infection and when to seek medical care all patient's questions addressed she will follow-up as needed.       BMI  Estimated body mass index is 38.77 kg/m  as calculated from the following:    Height as of 5/30/24: 1.803 m (5' 11\").    Weight as of this encounter: 126.1 kg (278 lb).           No follow-ups on file.      Subjective   Mckayla is a 39 year old, presenting for the following health issues:  Urgent Care (Pt present with a cut on L index finger, tender to the touch, incident happened a week ago, would like it to be looked at. Last Tdap was 12/27/23.)        5/30/2024     9:51 AM   Additional Questions   Roomed by es   Accompanied by self     HPI               Review of Systems  Constitutional, HEENT, cardiovascular, pulmonary, GI, , musculoskeletal, neuro, skin, endocrine and psych systems are negative, except as otherwise noted.      Objective    /87   Pulse 86   Temp 98  F (36.7  C) (Tympanic)   Resp 18   Wt 126.1 kg (278 lb)   SpO2 97%   BMI 38.77 kg/m    Body mass index is 38.77 kg/m .  Physical Exam   GENERAL: alert and no distress  EYES: Eyes grossly normal to inspection,  conjunctivae and sclerae normal  RESP: respirations regular nonlabored   SKIN: Laceration medial aspect left finger no signs of secondary infection bacitracin and bandages applied  PSYCH: mentation appears normal, affect normal/bright            Signed Electronically by: Lisa Diane NP    "

## 2025-03-02 ENCOUNTER — HEALTH MAINTENANCE LETTER (OUTPATIENT)
Age: 40
End: 2025-03-02

## 2025-03-17 ENCOUNTER — MYC MEDICAL ADVICE (OUTPATIENT)
Dept: FAMILY MEDICINE | Facility: CLINIC | Age: 40
End: 2025-03-17

## 2025-03-17 DIAGNOSIS — E87.6 HYPOKALEMIA: ICD-10-CM

## 2025-03-17 DIAGNOSIS — I10 ESSENTIAL HYPERTENSION WITH GOAL BLOOD PRESSURE LESS THAN 140/90: ICD-10-CM

## 2025-03-19 RX ORDER — HYDROCHLOROTHIAZIDE 25 MG/1
25 TABLET ORAL DAILY
Qty: 90 TABLET | Refills: 0 | Status: SHIPPED | OUTPATIENT
Start: 2025-03-19

## 2025-03-19 RX ORDER — AMLODIPINE BESYLATE 5 MG/1
5 TABLET ORAL DAILY
Qty: 90 TABLET | Refills: 0 | Status: SHIPPED | OUTPATIENT
Start: 2025-03-19

## 2025-03-19 RX ORDER — LOSARTAN POTASSIUM 100 MG/1
100 TABLET ORAL DAILY
Qty: 90 TABLET | Refills: 0 | Status: SHIPPED | OUTPATIENT
Start: 2025-03-19

## 2025-03-19 RX ORDER — POTASSIUM CHLORIDE 750 MG/1
20 TABLET, EXTENDED RELEASE ORAL DAILY
Qty: 180 TABLET | Refills: 3 | Status: SHIPPED | OUTPATIENT
Start: 2025-03-19

## 2025-03-19 NOTE — TELEPHONE ENCOUNTER
Patient had appt scheduled yesterday with PCP but was cancelled d/t provider illness - patient asking for quentin refills, almost out of BP meds and out of potassium. Routing to provider patient saw on 5/30/24 and discussed HTN.      Alicia Ashley, RN, BSN  [facility]  [place], [place] and [place]

## 2025-03-25 ENCOUNTER — HOSPITAL ENCOUNTER (EMERGENCY)
Facility: CLINIC | Age: 40
Discharge: HOME | End: 2025-03-25
Attending: EMERGENCY MEDICINE | Admitting: EMERGENCY MEDICINE
Payer: COMMERCIAL

## 2025-03-25 ENCOUNTER — APPOINTMENT (OUTPATIENT)
Dept: GENERAL RADIOLOGY | Facility: CLINIC | Age: 40
End: 2025-03-25
Attending: EMERGENCY MEDICINE
Payer: COMMERCIAL

## 2025-03-25 VITALS
WEIGHT: 266 LBS | TEMPERATURE: 97.9 F | SYSTOLIC BLOOD PRESSURE: 136 MMHG | HEART RATE: 84 BPM | RESPIRATION RATE: 16 BRPM | DIASTOLIC BLOOD PRESSURE: 90 MMHG | OXYGEN SATURATION: 98 % | BODY MASS INDEX: 38.08 KG/M2 | HEIGHT: 70 IN

## 2025-03-25 DIAGNOSIS — R07.89 ATYPICAL CHEST PAIN: ICD-10-CM

## 2025-03-25 LAB
ANION GAP SERPL CALCULATED.3IONS-SCNC: 11 MMOL/L (ref 7–15)
ATRIAL RATE - MUSE: 76 BPM
BASOPHILS # BLD AUTO: 0.1 10E3/UL (ref 0–0.2)
BASOPHILS NFR BLD AUTO: 1 %
BUN SERPL-MCNC: 10.8 MG/DL (ref 6–20)
CALCIUM SERPL-MCNC: 8.9 MG/DL (ref 8.8–10.4)
CHLORIDE SERPL-SCNC: 103 MMOL/L (ref 98–107)
CREAT SERPL-MCNC: 0.8 MG/DL (ref 0.51–0.95)
DIASTOLIC BLOOD PRESSURE - MUSE: NORMAL MMHG
EGFRCR SERPLBLD CKD-EPI 2021: >90 ML/MIN/1.73M2
EOSINOPHIL # BLD AUTO: 0.2 10E3/UL (ref 0–0.7)
EOSINOPHIL NFR BLD AUTO: 3 %
ERYTHROCYTE [DISTWIDTH] IN BLOOD BY AUTOMATED COUNT: 14.4 % (ref 10–15)
GLUCOSE SERPL-MCNC: 94 MG/DL (ref 70–99)
HCO3 SERPL-SCNC: 26 MMOL/L (ref 22–29)
HCT VFR BLD AUTO: 36.9 % (ref 35–47)
HGB BLD-MCNC: 11.4 G/DL (ref 11.7–15.7)
HOLD SPECIMEN: NORMAL
IMM GRANULOCYTES # BLD: 0 10E3/UL
IMM GRANULOCYTES NFR BLD: 0 %
INTERPRETATION ECG - MUSE: NORMAL
LYMPHOCYTES # BLD AUTO: 2.1 10E3/UL (ref 0.8–5.3)
LYMPHOCYTES NFR BLD AUTO: 22 %
MCH RBC QN AUTO: 25.2 PG (ref 26.5–33)
MCHC RBC AUTO-ENTMCNC: 30.9 G/DL (ref 31.5–36.5)
MCV RBC AUTO: 82 FL (ref 78–100)
MONOCYTES # BLD AUTO: 0.5 10E3/UL (ref 0–1.3)
MONOCYTES NFR BLD AUTO: 5 %
NEUTROPHILS # BLD AUTO: 6.8 10E3/UL (ref 1.6–8.3)
NEUTROPHILS NFR BLD AUTO: 70 %
NRBC # BLD AUTO: 0 10E3/UL
NRBC BLD AUTO-RTO: 0 /100
P AXIS - MUSE: 49 DEGREES
PLATELET # BLD AUTO: 392 10E3/UL (ref 150–450)
POTASSIUM SERPL-SCNC: 3.6 MMOL/L (ref 3.4–5.3)
PR INTERVAL - MUSE: 168 MS
QRS DURATION - MUSE: 86 MS
QT - MUSE: 414 MS
QTC - MUSE: 465 MS
R AXIS - MUSE: 21 DEGREES
RBC # BLD AUTO: 4.53 10E6/UL (ref 3.8–5.2)
SODIUM SERPL-SCNC: 140 MMOL/L (ref 135–145)
SYSTOLIC BLOOD PRESSURE - MUSE: NORMAL MMHG
T AXIS - MUSE: 38 DEGREES
TROPONIN T SERPL HS-MCNC: <6 NG/L
VENTRICULAR RATE- MUSE: 76 BPM
WBC # BLD AUTO: 9.8 10E3/UL (ref 4–11)

## 2025-03-25 PROCEDURE — 258N000003 HC RX IP 258 OP 636: Performed by: EMERGENCY MEDICINE

## 2025-03-25 PROCEDURE — 99285 EMERGENCY DEPT VISIT HI MDM: CPT | Mod: 25 | Performed by: EMERGENCY MEDICINE

## 2025-03-25 PROCEDURE — 80048 BASIC METABOLIC PNL TOTAL CA: CPT | Performed by: EMERGENCY MEDICINE

## 2025-03-25 PROCEDURE — 96374 THER/PROPH/DIAG INJ IV PUSH: CPT | Performed by: EMERGENCY MEDICINE

## 2025-03-25 PROCEDURE — 96361 HYDRATE IV INFUSION ADD-ON: CPT | Performed by: EMERGENCY MEDICINE

## 2025-03-25 PROCEDURE — 36415 COLL VENOUS BLD VENIPUNCTURE: CPT | Performed by: EMERGENCY MEDICINE

## 2025-03-25 PROCEDURE — 85004 AUTOMATED DIFF WBC COUNT: CPT | Performed by: EMERGENCY MEDICINE

## 2025-03-25 PROCEDURE — 96375 TX/PRO/DX INJ NEW DRUG ADDON: CPT | Performed by: EMERGENCY MEDICINE

## 2025-03-25 PROCEDURE — 85041 AUTOMATED RBC COUNT: CPT | Performed by: EMERGENCY MEDICINE

## 2025-03-25 PROCEDURE — 93005 ELECTROCARDIOGRAM TRACING: CPT | Performed by: EMERGENCY MEDICINE

## 2025-03-25 PROCEDURE — 84484 ASSAY OF TROPONIN QUANT: CPT | Performed by: EMERGENCY MEDICINE

## 2025-03-25 PROCEDURE — 250N000011 HC RX IP 250 OP 636: Performed by: EMERGENCY MEDICINE

## 2025-03-25 PROCEDURE — 71046 X-RAY EXAM CHEST 2 VIEWS: CPT

## 2025-03-25 RX ORDER — DIPHENHYDRAMINE HYDROCHLORIDE 50 MG/ML
25 INJECTION, SOLUTION INTRAMUSCULAR; INTRAVENOUS ONCE
Status: COMPLETED | OUTPATIENT
Start: 2025-03-25 | End: 2025-03-25

## 2025-03-25 RX ORDER — METOCLOPRAMIDE HYDROCHLORIDE 5 MG/ML
10 INJECTION INTRAMUSCULAR; INTRAVENOUS ONCE
Status: COMPLETED | OUTPATIENT
Start: 2025-03-25 | End: 2025-03-25

## 2025-03-25 RX ADMIN — DIPHENHYDRAMINE HYDROCHLORIDE 25 MG: 50 INJECTION, SOLUTION INTRAMUSCULAR; INTRAVENOUS at 09:45

## 2025-03-25 RX ADMIN — METOCLOPRAMIDE 10 MG: 5 INJECTION, SOLUTION INTRAMUSCULAR; INTRAVENOUS at 09:46

## 2025-03-25 RX ADMIN — SODIUM CHLORIDE 1000 ML: 0.9 INJECTION, SOLUTION INTRAVENOUS at 09:43

## 2025-03-25 ASSESSMENT — COLUMBIA-SUICIDE SEVERITY RATING SCALE - C-SSRS
6. HAVE YOU EVER DONE ANYTHING, STARTED TO DO ANYTHING, OR PREPARED TO DO ANYTHING TO END YOUR LIFE?: NO
1. IN THE PAST MONTH, HAVE YOU WISHED YOU WERE DEAD OR WISHED YOU COULD GO TO SLEEP AND NOT WAKE UP?: NO
2. HAVE YOU ACTUALLY HAD ANY THOUGHTS OF KILLING YOURSELF IN THE PAST MONTH?: NO

## 2025-03-25 ASSESSMENT — ACTIVITIES OF DAILY LIVING (ADL)
ADLS_ACUITY_SCORE: 41
ADLS_ACUITY_SCORE: 41

## 2025-03-25 NOTE — ED TRIAGE NOTES
Per EMS, patient called d/t having a headache x5 days and chest pain that started about 30 minutes ago while she was driving. On Wegovy and had a recent dose change. States what she is feeling now is different to symptoms with dose changes in the past. Vomited x2. VSS and NSR en route. Given 324 ASA and 4 mg Zofran ODT. Blood sugar 101.

## 2025-03-25 NOTE — ED PROVIDER NOTES
"  Emergency Department Note      History of Present Illness     Chief Complaint   Chest Pain      HPI   Mckayla Jeff is a 39 year old female with a history of type 2 diabetes, hypertension, PCOS, and obesity,  who presents to the emergency department today via EMS for evaluation of chest pain. The patient reports experiencing a tight, dull, chest pain that began around 0830. This pain radiated to her right arm, and caused her to feel lightheaded, nauseous, and her face to hurt. Mckayla also endorses a headache for the previous five days, as well as tenderness in bilateral calves starting yesterday. She has been on WeGovy for the previous several months, and recently had an increase in her medications. Mckayla took ibuprofen for her headache, which did not palliate her symptoms, and was given Aspirin by EMS while en route. She denies abdominal pain, cough, cold symptoms, runny nose, blood clot history, recent travel, or a history of headaches.     Independent Historian   None    Review of External Notes   I reviewed her telemedicine visit from November regarding her type 2 diabetes    Past Medical History     Medical History and Problem List   Obesity  KALLIE  Type 2 diabetes  Moderate recurrent major depression (CMS)  Adjustment disorder with mixed anxiety and depressed mood  PCOS  Hypertension  Irregular menses  Hypokalemia    Medications   amLODIPine  hydrochlorothiazide  losartan   metFORMIN   naproxen   ondansetron   potassium chloride paz ER   Aripiprazole  semaglutide    Physical Exam     Patient Vitals for the past 24 hrs:   BP Temp Temp src Pulse Resp SpO2 Height Weight   03/25/25 1055 -- -- -- -- 16 98 % -- --   03/25/25 0942 136/90 -- -- 84 -- 98 % -- --   03/25/25 0929 (!) 159/99 97.9  F (36.6  C) Oral 82 18 99 % 1.778 m (5' 10\") 120.7 kg (266 lb)     Physical Exam  Constitutional: Vital signs reviewed as above  General: Alert  HEENT: Moist mucous membranes  Eyes: Conjunctiva normal.   Neck: Normal " range of motion  Cardiovascular: Regular rate, Regular rhythm and normal heart sounds.  No MRG  Pulmonary/Chest: Effort normal and breath sounds normal. No respiratory distress. Patient has no wheezes. Patient has no rales. No chest wall tenderness.   Abdominal: Soft. Positive bowel sounds. No MRG.  Musculoskeletal/Extremities: Full ROM. No calf swelling or tenderness.   Endo: No pitting edema  Neurological: Alert, no focal deficits.  Skin: Skin is warm and dry.   Psychiatric: Pleasant    Diagnostics     Lab Results   Labs Ordered and Resulted from Time of ED Arrival to Time of ED Departure   CBC WITH PLATELETS AND DIFFERENTIAL - Abnormal       Result Value    WBC Count 9.8      RBC Count 4.53      Hemoglobin 11.4 (*)     Hematocrit 36.9      MCV 82      MCH 25.2 (*)     MCHC 30.9 (*)     RDW 14.4      Platelet Count 392      % Neutrophils 70      % Lymphocytes 22      % Monocytes 5      % Eosinophils 3      % Basophils 1      % Immature Granulocytes 0      NRBCs per 100 WBC 0      Absolute Neutrophils 6.8      Absolute Lymphocytes 2.1      Absolute Monocytes 0.5      Absolute Eosinophils 0.2      Absolute Basophils 0.1      Absolute Immature Granulocytes 0.0      Absolute NRBCs 0.0     BASIC METABOLIC PANEL - Normal    Sodium 140      Potassium 3.6      Chloride 103      Carbon Dioxide (CO2) 26      Anion Gap 11      Urea Nitrogen 10.8      Creatinine 0.80      GFR Estimate >90      Calcium 8.9      Glucose 94     TROPONIN T, HIGH SENSITIVITY - Normal    Troponin T, High Sensitivity <6         Imaging   XR Chest 2 Views   Final Result   IMPRESSION: No acute cardiopulmonary abnormality.          EKG   ECG taken at 1042, ECG read at 1046  Normal sinus rhythm   Rate 76 bpm. CA interval 168 ms. QRS duration 86 ms. QT/QTc 414/465 ms. P-R-T axes 49 21 38.    Independent Interpretation   Chest x-ray was negative for any signs of pneumothorax, pneumonia, effusion, cardiomegaly or dissection.    ED Course      Medications  Administered   Medications   sodium chloride 0.9% BOLUS 1,000 mL (0 mLs Intravenous Stopped 3/25/25 1055)   metoclopramide (REGLAN) injection 10 mg (10 mg Intravenous $Given 3/25/25 0946)   diphenhydrAMINE (BENADRYL) injection 25 mg (25 mg Intravenous $Given 3/25/25 0945)       Procedures   Procedures     Discussion of Management   None    ED Course   ED Course as of 03/25/25 1057   Tue Mar 25, 2025   0934 I obtained history and examined the patient as noted above.     1049 I explained findings to the patient and we discussed plan for discharge. The patient is comfortable with this plan.         Additional Documentation  None    Medical Decision Making / Diagnosis     CMS Diagnoses: None    MIPS       None    MDM   Mckayla Jeff is a 39 year old female who presents emerged department for the concerns as above.  She has had this happen several times but nothing of that, but.  Symptoms have since resolved.  Her EKG is nondiagnostic without signs of ischemia.  Troponin is less than 6 making acute coronary syndrome very unlikely.  There is no pleuritic discomfort and she is PERC negative making PE unlikely.  Basic labs were unremarkable.  Chest x-ray was normal.  Patient has been sleeping comfortably.  She will be discharged home.    Disposition   The patient was discharged.     Diagnosis     ICD-10-CM    1. Atypical chest pain  R07.89            Discharge Medications   New Prescriptions    No medications on file     Scribe Disclosure:  IRamez, am serving as a scribe at 9:37 AM on 3/25/2025 to document services personally performed by Maldonado Livingston MD based on my observations and the provider's statements to me.        Maldonado Livingston MD  03/25/25 3110

## 2025-04-16 ENCOUNTER — HOSPITAL ENCOUNTER (OUTPATIENT)
Dept: CARDIOLOGY | Facility: CLINIC | Age: 40
Discharge: HOME | End: 2025-04-16
Attending: INTERNAL MEDICINE
Payer: COMMERCIAL

## 2025-04-16 ENCOUNTER — MYC MEDICAL ADVICE (OUTPATIENT)
Dept: FAMILY MEDICINE | Facility: CLINIC | Age: 40
End: 2025-04-16

## 2025-04-16 DIAGNOSIS — R07.89 ATYPICAL CHEST PAIN: Primary | ICD-10-CM

## 2025-04-16 DIAGNOSIS — R07.89 ATYPICAL CHEST PAIN: ICD-10-CM

## 2025-04-16 PROCEDURE — 93017 CV STRESS TEST TRACING ONLY: CPT

## 2025-04-16 NOTE — TELEPHONE ENCOUNTER
See MyChart encounter below. Routing to provider to review and advise.    Patient missed cardiac stress test appt, cards unable to reschedule without a new order (originally ordered on 3/28)- please re-order if able, thank you!      Alicia Ashley, RN, BSN  [facility]  [place], [place] and [place]

## 2025-04-29 ENCOUNTER — ANCILLARY PROCEDURE (OUTPATIENT)
Dept: CARDIOLOGY | Facility: CLINIC | Age: 40
End: 2025-04-29
Attending: INTERNAL MEDICINE
Payer: COMMERCIAL

## 2025-04-29 DIAGNOSIS — R07.89 ATYPICAL CHEST PAIN: ICD-10-CM

## 2025-04-29 LAB — LVEF ECHO: NORMAL

## 2025-04-29 PROCEDURE — 93306 TTE W/DOPPLER COMPLETE: CPT | Performed by: INTERNAL MEDICINE

## 2025-04-29 RX ADMIN — Medication 5 ML: at 17:03

## 2025-07-16 DIAGNOSIS — I10 ESSENTIAL HYPERTENSION WITH GOAL BLOOD PRESSURE LESS THAN 140/90: ICD-10-CM

## 2025-07-16 DIAGNOSIS — E87.6 HYPOKALEMIA: ICD-10-CM

## 2025-07-16 RX ORDER — AMLODIPINE BESYLATE 5 MG/1
5 TABLET ORAL DAILY
Qty: 90 TABLET | Refills: 1 | Status: SHIPPED | OUTPATIENT
Start: 2025-07-16

## 2025-07-16 RX ORDER — HYDROCHLOROTHIAZIDE 25 MG/1
25 TABLET ORAL DAILY
Qty: 90 TABLET | Refills: 1 | Status: SHIPPED | OUTPATIENT
Start: 2025-07-16

## 2025-07-16 RX ORDER — LOSARTAN POTASSIUM 100 MG/1
100 TABLET ORAL DAILY
Qty: 90 TABLET | Refills: 1 | Status: SHIPPED | OUTPATIENT
Start: 2025-07-16

## 2025-07-16 RX ORDER — POTASSIUM CHLORIDE 750 MG/1
20 TABLET, EXTENDED RELEASE ORAL DAILY
Qty: 180 TABLET | Refills: 3 | OUTPATIENT
Start: 2025-07-16

## 2025-07-16 NOTE — TELEPHONE ENCOUNTER
Medication Question or Refill    Contacts       Contact Date/Time Type Contact Phone/Fax    07/16/2025 08:20 AM CDT Phone (Incoming) Mckayla Jeff (Self) [phone] (M)            What medication are you calling about (include dose and sig)?: potassium chloride paz ER (KLOR-CON M10) 10 MEQ CR tablet   hydrochlorothiazide (HYDRODIURIL) 25 MG tablet   amLODIPine (NORVASC) 5 MG tablet   losartan (COZAAR) 100 MG tablet     Preferred Pharmacy:    [id number] [address] [address]  Phone: [phone] Fax: [phone]      Controlled Substance Agreement on file:   CSA -- Patient Level:    CSA: None found at the patient level.       Who prescribed the medication?: Ophelia Quiroz APRN CNP     Do you need a refill? Yes    Patient offered an appointment? No    Do you have any questions or concerns?  No      Could we send this information to you in [facility] or would you prefer to receive a phone call?:   Patient would prefer a phone call   Okay to leave a detailed message?: Yes at Cell number on file:    Telephone Information:   Mobile [phone]